# Patient Record
Sex: FEMALE | Race: WHITE | Employment: OTHER | URBAN - METROPOLITAN AREA
[De-identification: names, ages, dates, MRNs, and addresses within clinical notes are randomized per-mention and may not be internally consistent; named-entity substitution may affect disease eponyms.]

---

## 2017-01-27 ENCOUNTER — OFFICE VISIT (OUTPATIENT)
Dept: FAMILY MEDICINE CLINIC | Age: 62
End: 2017-01-27

## 2017-01-27 VITALS
OXYGEN SATURATION: 99 % | RESPIRATION RATE: 18 BRPM | WEIGHT: 193 LBS | HEIGHT: 64 IN | SYSTOLIC BLOOD PRESSURE: 118 MMHG | DIASTOLIC BLOOD PRESSURE: 70 MMHG | HEART RATE: 77 BPM | BODY MASS INDEX: 32.95 KG/M2

## 2017-01-27 DIAGNOSIS — Z23 NEED FOR ZOSTER VACCINATION: ICD-10-CM

## 2017-01-27 DIAGNOSIS — Z00.00 PREVENTATIVE HEALTH CARE: Primary | ICD-10-CM

## 2017-01-27 DIAGNOSIS — Z01.419 ENCOUNTER FOR GYNECOLOGICAL EXAMINATION WITHOUT ABNORMAL FINDING: ICD-10-CM

## 2017-01-27 LAB
ANION GAP SERPL CALCULATED.3IONS-SCNC: 16 MMOL/L (ref 3–16)
BUN BLDV-MCNC: 15 MG/DL (ref 7–20)
CALCIUM SERPL-MCNC: 9.5 MG/DL (ref 8.3–10.6)
CHLORIDE BLD-SCNC: 99 MMOL/L (ref 99–110)
CHOLESTEROL, TOTAL: 252 MG/DL (ref 0–199)
CO2: 27 MMOL/L (ref 21–32)
CREAT SERPL-MCNC: 0.7 MG/DL (ref 0.6–1.2)
GFR AFRICAN AMERICAN: >60
GFR NON-AFRICAN AMERICAN: >60
GLUCOSE BLD-MCNC: 93 MG/DL (ref 70–99)
HDLC SERPL-MCNC: 68 MG/DL (ref 40–60)
LDL CHOLESTEROL CALCULATED: 157 MG/DL
POTASSIUM SERPL-SCNC: 4.5 MMOL/L (ref 3.5–5.1)
SODIUM BLD-SCNC: 142 MMOL/L (ref 136–145)
TRIGL SERPL-MCNC: 136 MG/DL (ref 0–150)
VLDLC SERPL CALC-MCNC: 27 MG/DL

## 2017-01-27 PROCEDURE — 36415 COLL VENOUS BLD VENIPUNCTURE: CPT | Performed by: FAMILY MEDICINE

## 2017-01-27 PROCEDURE — 99396 PREV VISIT EST AGE 40-64: CPT | Performed by: FAMILY MEDICINE

## 2017-01-27 ASSESSMENT — ENCOUNTER SYMPTOMS: GASTROINTESTINAL NEGATIVE: 1

## 2017-02-01 LAB
HPV COMMENT: NORMAL
HPV TYPE 16: NOT DETECTED
HPV TYPE 18: NOT DETECTED
HPVOH (OTHER TYPES): NOT DETECTED

## 2017-08-17 ENCOUNTER — HOSPITAL ENCOUNTER (OUTPATIENT)
Dept: MAMMOGRAPHY | Age: 62
Discharge: HOME | End: 2017-08-17
Attending: FAMILY MEDICINE | Admitting: FAMILY MEDICINE

## 2017-08-17 DIAGNOSIS — Z12.31 ENCOUNTER FOR SCREENING MAMMOGRAM FOR BREAST CANCER: ICD-10-CM

## 2017-08-25 ENCOUNTER — HOSPITAL ENCOUNTER (OUTPATIENT)
Dept: MAMMOGRAPHY | Age: 62
Discharge: HOME | End: 2017-08-25
Attending: FAMILY MEDICINE | Admitting: FAMILY MEDICINE

## 2017-08-25 DIAGNOSIS — R92.8 ABNORMAL MAMMOGRAM, UNSPECIFIED: ICD-10-CM

## 2018-07-31 ENCOUNTER — OFFICE VISIT (OUTPATIENT)
Dept: FAMILY MEDICINE CLINIC | Age: 63
End: 2018-07-31

## 2018-07-31 ENCOUNTER — HOSPITAL ENCOUNTER (OUTPATIENT)
Dept: GENERAL RADIOLOGY | Age: 63
Discharge: HOME | End: 2018-07-31
Payer: COMMERCIAL

## 2018-07-31 VITALS
HEIGHT: 64 IN | HEART RATE: 75 BPM | WEIGHT: 191 LBS | OXYGEN SATURATION: 98 % | DIASTOLIC BLOOD PRESSURE: 84 MMHG | SYSTOLIC BLOOD PRESSURE: 128 MMHG | BODY MASS INDEX: 32.61 KG/M2

## 2018-07-31 DIAGNOSIS — G89.29 CHRONIC PAIN OF BOTH KNEES: ICD-10-CM

## 2018-07-31 DIAGNOSIS — E78.5 DYSLIPIDEMIA: ICD-10-CM

## 2018-07-31 DIAGNOSIS — M25.561 CHRONIC PAIN OF BOTH KNEES: ICD-10-CM

## 2018-07-31 DIAGNOSIS — M25.562 CHRONIC PAIN OF BOTH KNEES: ICD-10-CM

## 2018-07-31 DIAGNOSIS — Z23 NEED FOR PROPHYLACTIC VACCINATION WITH TETANUS-DIPHTHERIA (TD): ICD-10-CM

## 2018-07-31 DIAGNOSIS — Z12.11 COLON CANCER SCREENING: ICD-10-CM

## 2018-07-31 DIAGNOSIS — M25.561 CHRONIC PAIN OF BOTH KNEES: Primary | ICD-10-CM

## 2018-07-31 DIAGNOSIS — G89.29 CHRONIC PAIN OF BOTH KNEES: Primary | ICD-10-CM

## 2018-07-31 DIAGNOSIS — E55.9 VITAMIN D DEFICIENCY: ICD-10-CM

## 2018-07-31 DIAGNOSIS — M25.562 CHRONIC PAIN OF BOTH KNEES: Primary | ICD-10-CM

## 2018-07-31 LAB
A/G RATIO: 2.1 (ref 1.1–2.2)
ALBUMIN SERPL-MCNC: 4.9 G/DL (ref 3.4–5)
ALP BLD-CCNC: 81 U/L (ref 40–129)
ALT SERPL-CCNC: 12 U/L (ref 10–40)
ANION GAP SERPL CALCULATED.3IONS-SCNC: 16 MMOL/L (ref 3–16)
AST SERPL-CCNC: 13 U/L (ref 15–37)
BILIRUB SERPL-MCNC: 0.5 MG/DL (ref 0–1)
BUN BLDV-MCNC: 12 MG/DL (ref 7–20)
CALCIUM SERPL-MCNC: 9.6 MG/DL (ref 8.3–10.6)
CHLORIDE BLD-SCNC: 102 MMOL/L (ref 99–110)
CHOLESTEROL, TOTAL: 255 MG/DL (ref 0–199)
CO2: 25 MMOL/L (ref 21–32)
CREAT SERPL-MCNC: 0.6 MG/DL (ref 0.6–1.2)
GFR AFRICAN AMERICAN: >60
GFR NON-AFRICAN AMERICAN: >60
GLOBULIN: 2.3 G/DL
GLUCOSE BLD-MCNC: 99 MG/DL (ref 70–99)
HDLC SERPL-MCNC: 70 MG/DL (ref 40–60)
LDL CHOLESTEROL CALCULATED: 167 MG/DL
POTASSIUM SERPL-SCNC: 4.4 MMOL/L (ref 3.5–5.1)
SODIUM BLD-SCNC: 143 MMOL/L (ref 136–145)
TOTAL PROTEIN: 7.2 G/DL (ref 6.4–8.2)
TRIGL SERPL-MCNC: 91 MG/DL (ref 0–150)
VITAMIN D 25-HYDROXY: 24 NG/ML
VLDLC SERPL CALC-MCNC: 18 MG/DL

## 2018-07-31 PROCEDURE — 90714 TD VACC NO PRESV 7 YRS+ IM: CPT | Performed by: FAMILY MEDICINE

## 2018-07-31 PROCEDURE — 90471 IMMUNIZATION ADMIN: CPT | Performed by: FAMILY MEDICINE

## 2018-07-31 PROCEDURE — 99214 OFFICE O/P EST MOD 30 MIN: CPT | Performed by: FAMILY MEDICINE

## 2018-07-31 PROCEDURE — G8417 CALC BMI ABV UP PARAM F/U: HCPCS | Performed by: FAMILY MEDICINE

## 2018-07-31 PROCEDURE — 1036F TOBACCO NON-USER: CPT | Performed by: FAMILY MEDICINE

## 2018-07-31 PROCEDURE — G8427 DOCREV CUR MEDS BY ELIG CLIN: HCPCS | Performed by: FAMILY MEDICINE

## 2018-07-31 PROCEDURE — 36415 COLL VENOUS BLD VENIPUNCTURE: CPT | Performed by: FAMILY MEDICINE

## 2018-07-31 PROCEDURE — 73562 X-RAY EXAM OF KNEE 3: CPT

## 2018-07-31 PROCEDURE — 3017F COLORECTAL CA SCREEN DOC REV: CPT | Performed by: FAMILY MEDICINE

## 2018-07-31 ASSESSMENT — PATIENT HEALTH QUESTIONNAIRE - PHQ9
1. LITTLE INTEREST OR PLEASURE IN DOING THINGS: 0
2. FEELING DOWN, DEPRESSED OR HOPELESS: 0
SUM OF ALL RESPONSES TO PHQ QUESTIONS 1-9: 0
SUM OF ALL RESPONSES TO PHQ9 QUESTIONS 1 & 2: 0

## 2018-07-31 ASSESSMENT — ENCOUNTER SYMPTOMS: SHORTNESS OF BREATH: 0

## 2018-07-31 NOTE — PATIENT INSTRUCTIONS
steps 1 through 4, even if only one knee is sore. Straight-leg raises to the front    1. Lie on your back with your good knee bent so that your foot rests flat on the floor. Your affected leg should be straight. Make sure that your low back has a normal curve. You should be able to slip your hand in between the floor and the small of your back, with your palm touching the floor and your back touching the back of your hand. 2. Tighten the thigh muscles in your affected leg by pressing the back of your knee flat down to the floor. Hold your knee straight. 3. Keeping the thigh muscles tight and your leg straight, lift your affected leg up so that your heel is about 12 inches off the floor. Hold for about 6 seconds, then lower slowly. 4. Relax for up to 10 seconds between repetitions. 5. Repeat 8 to 12 times. 6. Switch legs and repeat steps 1 through 5, even if only one knee is sore. Active knee flexion    1. Lie on your stomach with your knees straight. If your kneecap is uncomfortable, roll up a washcloth and put it under your leg just above your kneecap. 2. Lift the foot of your affected leg by bending the knee so that you bring the foot up toward your buttock. If this motion hurts, try it without bending your knee quite as far. This may help you avoid any painful motion. 3. Slowly move your leg up and down. 4. Repeat 8 to 12 times. 5. Switch legs and repeat steps 1 through 4, even if only one knee is sore. Quadriceps stretch (facedown)    1. Lie flat on your stomach, and rest your face on the floor. 2. Wrap a towel or belt strap around the lower part of your affected leg. Then use the towel or belt strap to slowly pull your heel toward your buttock until you feel a stretch. 3. Hold for about 15 to 30 seconds, then relax your leg against the towel or belt strap. 4. Repeat 2 to 4 times. 5. Switch legs and repeat steps 1 through 4, even if only one knee is sore. Stationary exercise bike    1.  If you do

## 2018-07-31 NOTE — PROGRESS NOTES
Chief Complaint   Patient presents with    New Patient     former [facility] patient         HPI      59 y.o. female presents today to establish care. She states that  2 years ago started having knee pain. Escalated to sciatica and hip bursitis in both. Did PT and celebrex for 3 months. Started going to the [facility] and did swimming and hip and sciatica pain resolved. Her knee pain is worse with going down the stairs. If she sits or stands for too long it hurts. Takes aleve and ibuprofen but it doesn't help. Does her PT home exercises at home. Didn't feel it was helpful. Had imaging 10 years ago which showed mild arthritis. No knee swelling. Doesn't feel like it would give out. Hx of HLD not on medication. Hx of vitamin d def taking supplements. Patient Active Problem List   Diagnosis    Menopause    Pure hypercholesterolemia    Vitamin D deficiency     Past Medical History:   Diagnosis Date    Hyperlipidemia     Menopause      (spontaneous vaginal delivery)            Past Surgical History:   Procedure Laterality Date    COLONOSCOPY  02/15/2001, 2006, 10/1/12     Most Recent Immunizations   Administered Date(s) Administered    Tdap (Boostrix, Adacel) 2007        Current Outpatient Prescriptions   Medication Sig Dispense Refill    NONFORMULARY tumeric      vitamin D (CHOLECALCIFEROL) 400 UNITS TABS tablet Take 400 Units by mouth daily.  Multiple Vitamin (MULTI-VITAMIN PO) Take  by mouth. No current facility-administered medications for this visit.       Allergies   Allergen Reactions    Penicillins        Social History     Social History    Marital status:      Spouse name: N/A    Number of children: N/A    Years of education: N/A     Social History Main Topics    Smoking status: Never Smoker    Smokeless tobacco: Never Used    Alcohol use No    Drug use: No    Sexual activity: Yes     Partners: Male      Comment: miranda = Lorenzo     Other Topics Concern   

## 2018-08-01 ENCOUNTER — TELEPHONE (OUTPATIENT)
Dept: FAMILY MEDICINE CLINIC | Age: 63
End: 2018-08-01

## 2018-08-01 DIAGNOSIS — M17.10 ARTHRITIS OF KNEE: Primary | ICD-10-CM

## 2018-08-01 NOTE — TELEPHONE ENCOUNTER
----- Message from Shira Walters MD sent at 7/31/2018  1:13 PM EDT -----  xrays show worsening moderate to severe arthritis in both knees. Please refer to Dr. Mali Gonzalez at [facility] and give her the number to schedule.

## 2018-08-16 ENCOUNTER — OFFICE VISIT (OUTPATIENT)
Dept: ORTHOPEDIC SURGERY | Age: 63
End: 2018-08-16

## 2018-08-16 VITALS — HEIGHT: 64 IN | WEIGHT: 190 LBS | BODY MASS INDEX: 32.44 KG/M2

## 2018-08-16 DIAGNOSIS — M17.0 PRIMARY OSTEOARTHRITIS OF BOTH KNEES: Primary | ICD-10-CM

## 2018-08-16 PROCEDURE — 99243 OFF/OP CNSLTJ NEW/EST LOW 30: CPT | Performed by: ORTHOPAEDIC SURGERY

## 2018-08-16 PROCEDURE — G8427 DOCREV CUR MEDS BY ELIG CLIN: HCPCS | Performed by: ORTHOPAEDIC SURGERY

## 2018-08-16 PROCEDURE — G8417 CALC BMI ABV UP PARAM F/U: HCPCS | Performed by: ORTHOPAEDIC SURGERY

## 2018-08-16 PROCEDURE — 3017F COLORECTAL CA SCREEN DOC REV: CPT | Performed by: ORTHOPAEDIC SURGERY

## 2018-08-16 RX ORDER — MELOXICAM 15 MG/1
15 TABLET ORAL DAILY
Qty: 30 TABLET | Refills: 3 | Status: SHIPPED | OUTPATIENT
Start: 2018-08-16 | End: 2019-08-21 | Stop reason: ALTCHOICE

## 2018-08-16 NOTE — PROGRESS NOTES
I am evaluating this patient as a consult at the request of Graham Leonard MD      Chief Complaint:  Knee Pain (Bilat knee pain - started years ago, no inj)      History of Present Illness:  Molly Gregory is a 59 y.o. female here regarding bilateral knee pain, this is been ongoing for at least 2 years but has been intermittent in nature, is becoming worse over the last 3-6 months. She currently has 7 out of 10 pain after walking for long periods of time. She'll have stiffness in the morning which resolves with ambulation, she'll then noticed that she needs to sit down and rest his knees are 18 by about 2 in the afternoon. Her pain is globally located throughout the knees and is dull and achy in nature. She continues to be quite active participates in a deep water cardio aerobics class IV days a week which helps with her overall health, weight loss and knee pain. She has been treated in the past, 2 years ago she took a 3 month supply of Celebrex but stopped as it was not helping her knee pain. She has not tried any injections, she did formal physical therapy also 2 years ago for 3 months without significant benefit. He she will occasionally take ibuprofen or Aleve over-the-counter if absolutely necessary, she also takes tumor supplement to decrease inflammation. She has been working with her primary care physician for her osteoarthritis, she was referred to me today by her primary care physician to discuss other treatment options. She works as a homemaker. BMI is 32.     Pain Assessment:  Pain Assessment  Location of Pain: Knee  Location Modifiers: Right, Left  Severity of Pain: 7  Quality of Pain: Aching  Duration of Pain: Persistent  Frequency of Pain: Constant  Aggravating Factors: Bending, Exercise, Walking, Stairs  Limiting Behavior: Yes  Relieving Factors: Rest  Result of Injury: No  Work-Related Injury: No  Are there other pain locations you wish to document?: No    Medical History:  Past Medical History:   Diagnosis Date    Hyperlipidemia     Menopause      (spontaneous vaginal delivery)          Past Surgical History:   Procedure Laterality Date    COLONOSCOPY  02/15/2001, 2006, 10/1/12     Social History     Social History    Marital status:      Spouse name: N/A    Number of children: N/A    Years of education: N/A     Social History Main Topics    Smoking status: Never Smoker    Smokeless tobacco: Never Used    Alcohol use No    Drug use: No    Sexual activity: Yes     Partners: Male      Comment: husb = Lorenzo     Other Topics Concern    None     Social History Narrative    None     Allergies   Allergen Reactions    Penicillins        Review of Systems:  Constitutional: negative  Respiratory: negative  Cardiovascular: negative  Musculoskeletal:negative except for Knee Pain (Bilat knee pain - started years ago, no inj)    Relevant review of systems reviewed and available in the patient's chart in media tab    Vital Signs:  Vitals:    18 0924   Weight: 190 lb (86.2 kg)   Height: 5' 4\" (1.626 m)         General Exam:   Constitutional: Patient is adequately groomed with no evidence of malnutrition  Mental Status: The patient is oriented to time, place and person. The patient's mood and affect are appropriate. Vascular: Examination reveals no swelling or calf tenderness. Peripheral pulses are palpable and 2+.    bilateral Knee Examination  Inspection:   No gross deformities noted. no swelling noted. No erythema or ecchymosis. Skin is intact.     Palpation: positive Tenderness to palpation along the medial joint line, negative Tenderness to palpation along lateral joint line, positive Tenderness to palpation along medial and lateral facets of undersurface of the patella    Range of Motion:  0-125    Strength:  Able to do SLR    Special Tests:  ACL, MCL, PCL, LCL are intact with stress exam.  There positive crepitus noted with range of motion under the cortisone and lubrication injections, she does not wish to pursue any injections today for a scheduled appointment future. Cortisone. She will continue doing exercises at the [facility] for strengthening and range of motion.   She'll follow up over the next 4-6 weeks for reevaluation      Mary Moreno

## 2018-08-29 ENCOUNTER — HOSPITAL ENCOUNTER (OUTPATIENT)
Dept: WOMENS IMAGING | Age: 63
Discharge: HOME | End: 2018-08-29
Payer: COMMERCIAL

## 2018-08-29 DIAGNOSIS — Z12.31 ENCOUNTER FOR SCREENING MAMMOGRAM FOR BREAST CANCER: ICD-10-CM

## 2018-08-29 PROCEDURE — 77067 SCR MAMMO BI INCL CAD: CPT

## 2018-11-12 DIAGNOSIS — M25.561 ACUTE PAIN OF BOTH KNEES: Primary | ICD-10-CM

## 2018-11-12 DIAGNOSIS — M25.562 ACUTE PAIN OF BOTH KNEES: Primary | ICD-10-CM

## 2018-11-12 RX ORDER — MELOXICAM 15 MG/1
15 TABLET ORAL DAILY
Qty: 30 TABLET | Refills: 1 | Status: SHIPPED | OUTPATIENT
Start: 2018-11-12 | End: 2019-02-10 | Stop reason: SDUPTHER

## 2019-02-10 DIAGNOSIS — M25.562 ACUTE PAIN OF BOTH KNEES: ICD-10-CM

## 2019-02-10 DIAGNOSIS — M25.561 ACUTE PAIN OF BOTH KNEES: ICD-10-CM

## 2019-02-11 RX ORDER — MELOXICAM 15 MG/1
TABLET ORAL
Qty: 30 TABLET | Refills: 1 | Status: SHIPPED | OUTPATIENT
Start: 2019-02-11 | End: 2019-08-21 | Stop reason: CLARIF

## 2019-05-23 ENCOUNTER — OFFICE VISIT (OUTPATIENT)
Dept: ORTHOPEDIC SURGERY | Age: 64
End: 2019-05-23
Payer: COMMERCIAL

## 2019-05-23 VITALS — HEIGHT: 64 IN | WEIGHT: 190.04 LBS | BODY MASS INDEX: 32.44 KG/M2

## 2019-05-23 DIAGNOSIS — M17.0 PRIMARY OSTEOARTHRITIS OF BOTH KNEES: Primary | ICD-10-CM

## 2019-05-23 DIAGNOSIS — M25.562 ACUTE PAIN OF BOTH KNEES: ICD-10-CM

## 2019-05-23 DIAGNOSIS — M25.561 ACUTE PAIN OF BOTH KNEES: ICD-10-CM

## 2019-05-23 PROCEDURE — 99213 OFFICE O/P EST LOW 20 MIN: CPT | Performed by: ORTHOPAEDIC SURGERY

## 2019-05-23 PROCEDURE — G8427 DOCREV CUR MEDS BY ELIG CLIN: HCPCS | Performed by: ORTHOPAEDIC SURGERY

## 2019-05-23 PROCEDURE — 20610 DRAIN/INJ JOINT/BURSA W/O US: CPT | Performed by: ORTHOPAEDIC SURGERY

## 2019-05-23 PROCEDURE — 3017F COLORECTAL CA SCREEN DOC REV: CPT | Performed by: ORTHOPAEDIC SURGERY

## 2019-05-23 PROCEDURE — 1036F TOBACCO NON-USER: CPT | Performed by: ORTHOPAEDIC SURGERY

## 2019-05-23 PROCEDURE — G8417 CALC BMI ABV UP PARAM F/U: HCPCS | Performed by: ORTHOPAEDIC SURGERY

## 2019-05-23 RX ORDER — MELOXICAM 15 MG/1
15 TABLET ORAL DAILY
Qty: 30 TABLET | Refills: 3 | Status: SHIPPED | OUTPATIENT
Start: 2019-05-23 | End: 2019-08-19 | Stop reason: SDUPTHER

## 2019-05-23 NOTE — PROGRESS NOTES
Depo-Medrol administration details:  Date & Time: 5/23/19 11:11 AM  Site & Comments:tad knee administered by Dr. Leonides Kaminski  # CC: 2  [facility]: [id number]   Lot number: [id number]  Exp: 11/2019    Sensorcaine 0.25%                                            # CC: 4                                                                  [facility]: 89371-991-57                                                Lot number: [id number]                                     Exp: 12/2022                                                                                                   Lidocaine 1%  #CC: 4  NDC: 4095-0194-65  Lot#: [id number]  Exp: 10/1/2020

## 2019-05-23 NOTE — PROGRESS NOTES
Chief Complaint:  Follow-up (ck tad knee)      History of Present Illness:  Emelina Mckenna is a 62 y.o. female here regarding bilateral knee pain, I saw her in 2018 we discussed treatment options for osteoarthritis. Patient elected meloxicam at that point, she has seen improvement in symptoms with the meloxicam.  She is here today to discuss further treatment options. She has noticed 6 out of 10 dull achy pain, this is worse with walking. She is going on a 2-1/2 week trip out [place] where she will be doing more walking than usual which prompted her visit today. She ran out of the meloxicam as well.     Pain Assessment:  Pain Assessment  Location of Pain: Knee  Location Modifiers: Right, Left  Severity of Pain: 6  Result of Injury: No  Work-Related Injury: No  Are there other pain locations you wish to document?: No    Medical History:  Past Medical History:   Diagnosis Date    Hyperlipidemia     Menopause      (spontaneous vaginal delivery)          Past Surgical History:   Procedure Laterality Date    COLONOSCOPY  02/15/2001, 2006, 10/1/12     Social History     Socioeconomic History    Marital status:      Spouse name: None    Number of children: None    Years of education: None    Highest education level: None   Occupational History    None   Social Needs    Financial resource strain: None    Food insecurity:     Worry: None     Inability: None    Transportation needs:     Medical: None     Non-medical: None   Tobacco Use    Smoking status: Never Smoker    Smokeless tobacco: Never Used   Substance and Sexual Activity    Alcohol use: No    Drug use: No    Sexual activity: Yes     Partners: Male     Comment: husb = Lorenzo   Lifestyle    Physical activity:     Days per week: None     Minutes per session: None    Stress: None   Relationships    Social connections:     Talks on phone: None     Gets together: None     Attends Yazidism service: None     Active member of club or organization: None     Attends meetings of clubs or organizations: None     Relationship status: None    Intimate partner violence:     Fear of current or ex partner: None     Emotionally abused: None     Physically abused: None     Forced sexual activity: None   Other Topics Concern    None   Social History Narrative    None     Allergies   Allergen Reactions    Penicillins        Review of Systems:  Constitutional: negative  Respiratory: negative  Cardiovascular: negative  Musculoskeletal:negative except for Follow-up (ck tad knee)    Relevant review of systems reviewed and available in the patient's chart in media tab    Vital Signs:  Vitals:    05/23/19 1105   Weight: 190 lb 0.6 oz (86.2 kg)   Height: 5' 4.02\" (1.626 m)         General Exam:   Constitutional: Patient is adequately groomed with no evidence of malnutrition  Mental Status: The patient is oriented to time, place and person. The patient's mood and affect are appropriate. Vascular: Examination reveals no swelling or calf tenderness. Peripheral pulses are palpable and 2+.    bilateral Knee Examination  And clean dry and intact, no effusion, crepitus under the patella with range of motion, mild tenderness along medial and lateral joint lines. 5 out of 5 flexion and extension strength. Distal neurovascular exam is intact. LUMBAR SPINE: The skin is warm and dry. There is no swelling, warmth, or erythema. Range of motion is within normal limits. There is no paraspinal or spinous process tenderness. Ipsilateral and contralateral straight leg raising tests are negative. The distal neurovascular exam is grossly intact and symmetric. Assessment :  Bilateral knee osteoarthritis    Impression:  Encounter Diagnoses   Name Primary?     Primary osteoarthritis of both knees Yes    Acute pain of both knees        Office Procedures:  Orders Placed This Encounter   Procedures    ID METHYLPREDNISOLONE 40 MG INJ    ID ARTHROCENTESIS ASPIR&/INJ

## 2019-08-19 DIAGNOSIS — M25.562 ACUTE PAIN OF BOTH KNEES: ICD-10-CM

## 2019-08-19 DIAGNOSIS — M25.561 ACUTE PAIN OF BOTH KNEES: ICD-10-CM

## 2019-08-19 DIAGNOSIS — M17.0 PRIMARY OSTEOARTHRITIS OF BOTH KNEES: ICD-10-CM

## 2019-08-20 RX ORDER — MELOXICAM 15 MG/1
TABLET ORAL
Qty: 30 TABLET | Refills: 3 | Status: SHIPPED | OUTPATIENT
Start: 2019-08-20 | End: 2020-01-13

## 2019-08-21 ENCOUNTER — OFFICE VISIT (OUTPATIENT)
Dept: FAMILY MEDICINE CLINIC | Age: 64
End: 2019-08-21
Payer: COMMERCIAL

## 2019-08-21 VITALS
WEIGHT: 196 LBS | BODY MASS INDEX: 33.63 KG/M2 | DIASTOLIC BLOOD PRESSURE: 86 MMHG | HEART RATE: 64 BPM | SYSTOLIC BLOOD PRESSURE: 138 MMHG | TEMPERATURE: 98.4 F | OXYGEN SATURATION: 98 %

## 2019-08-21 DIAGNOSIS — E55.9 VITAMIN D DEFICIENCY: ICD-10-CM

## 2019-08-21 DIAGNOSIS — Z12.83 SKIN EXAM, SCREENING FOR CANCER: ICD-10-CM

## 2019-08-21 DIAGNOSIS — E78.5 DYSLIPIDEMIA: ICD-10-CM

## 2019-08-21 DIAGNOSIS — Z00.00 ANNUAL PHYSICAL EXAM: Primary | ICD-10-CM

## 2019-08-21 LAB
CHOLESTEROL, TOTAL: 269 MG/DL (ref 0–199)
HDLC SERPL-MCNC: 75 MG/DL (ref 40–60)
LDL CHOLESTEROL CALCULATED: 173 MG/DL
TRIGL SERPL-MCNC: 104 MG/DL (ref 0–150)
VITAMIN D 25-HYDROXY: 29 NG/ML
VLDLC SERPL CALC-MCNC: 21 MG/DL

## 2019-08-21 PROCEDURE — 99396 PREV VISIT EST AGE 40-64: CPT | Performed by: FAMILY MEDICINE

## 2019-08-21 PROCEDURE — 36415 COLL VENOUS BLD VENIPUNCTURE: CPT | Performed by: FAMILY MEDICINE

## 2019-08-21 ASSESSMENT — ENCOUNTER SYMPTOMS
VOMITING: 0
ABDOMINAL PAIN: 0
NAUSEA: 0
DIARRHEA: 0
CONSTIPATION: 0
SHORTNESS OF BREATH: 0

## 2019-08-21 NOTE — PATIENT INSTRUCTIONS
Patient Education        Well Visit, Women 48 to 72: Care Instructions  Your Care Instructions    Physical exams can help you stay healthy. Your doctor has checked your overall health and may have suggested ways to take good care of yourself. He or she also may have recommended tests. At home, you can help prevent illness with healthy eating, regular exercise, and other steps. Follow-up care is a key part of your treatment and safety. Be sure to make and go to all appointments, and call your doctor if you are having problems. It's also a good idea to know your test results and keep a list of the medicines you take. How can you care for yourself at home? · Reach and stay at a healthy weight. This will lower your risk for many problems, such as obesity, diabetes, heart disease, and high blood pressure. · Get at least 30 minutes of exercise on most days of the week. Walking is a good choice. You also may want to do other activities, such as running, swimming, cycling, or playing tennis or team sports. · Do not smoke. Smoking can make health problems worse. If you need help quitting, talk to your doctor about stop-smoking programs and medicines. These can increase your chances of quitting for good. · Protect your skin from too much sun. When you're outdoors from 10 a.m. to 4 p.m., stay in the shade or cover up with clothing and a hat with a wide brim. Wear sunglasses that block UV rays. Even when it's cloudy, put broad-spectrum sunscreen (SPF 30 or higher) on any exposed skin. · See a dentist one or two times a year for checkups and to have your teeth cleaned. · Wear a seat belt in the car. Follow your doctor's advice about when to have certain tests. These tests can spot problems early. · Cholesterol. Your doctor will tell you how often to have this done based on your age, family history, or other things that can increase your risk for heart attack and stroke. · Blood pressure.  Have your blood pressure

## 2019-08-21 NOTE — PROGRESS NOTES
Male     Comment: husb = Lorenzo   Lifestyle    Physical activity:     Days per week: Not on file     Minutes per session: Not on file    Stress: Not on file   Relationships    Social connections:     Talks on phone: Not on file     Gets together: Not on file     Attends Christianity service: Not on file     Active member of club or organization: Not on file     Attends meetings of clubs or organizations: Not on file     Relationship status: Not on file    Intimate partner violence:     Fear of current or ex partner: Not on file     Emotionally abused: Not on file     Physically abused: Not on file     Forced sexual activity: Not on file   Other Topics Concern    Not on file   Social History Narrative    Not on file     Family History   Problem Relation Age of Onset    Cancer Mother         colon cancer/cervical cancer    Cataracts Mother     High Cholesterol Father     Cataracts Father     Cancer Brother         anal cancer                              Review Of Systems    Review of Systems   Constitutional: Negative for chills and fever. Eyes: Negative for visual disturbance. Respiratory: Negative for shortness of breath. Cardiovascular: Negative for chest pain. Gastrointestinal: Negative for abdominal pain, constipation, diarrhea, nausea and vomiting. Genitourinary: Negative for dysuria. Psychiatric/Behavioral: Negative for behavioral problems. PHYSICAL EXAMINATION:    /86   Pulse 64   Temp 98.4 °F (36.9 °C) (Oral)   Wt 196 lb (88.9 kg)   LMP  (LMP Unknown)   SpO2 98%   BMI 33.63 kg/m²      Physical Exam   Constitutional: She is oriented to person, place, and time. She appears well-developed and well-nourished. No distress. HENT:   Head: Normocephalic and atraumatic. Right Ear: External ear normal.   Left Ear: External ear normal.   Mouth/Throat: Oropharynx is clear and moist. No oropharyngeal exudate. Eyes: Pupils are equal, round, and reactive to light.  Conjunctivae and EOM are normal. Right eye exhibits no discharge. Left eye exhibits no discharge. Neck: Normal range of motion. No thyromegaly present. Cardiovascular: Normal rate, regular rhythm and normal heart sounds. Pulmonary/Chest: Effort normal and breath sounds normal. No respiratory distress. She has no wheezes. Neurological: She is alert and oriented to person, place, and time. Skin: Skin is warm and dry. She is not diaphoretic. Psychiatric: She has a normal mood and affect. Vitals reviewed. ASSESSMENT:   Well Adult, See encounter diagnoses  Lindy Henson was seen today for annual exam.    Diagnoses and all orders for this visit:    Annual physical exam  Advised to work on improving diet and engaging in cardio 30 minutes day 5 days a week. -     Hemoglobin A1C    Dyslipidemia  -     Lipid Panel    Vitamin D deficiency  -     Vitamin D 25 Hydroxy    Skin exam, screening for cancer  -     Rupa Bailey MD, Dermatology, [facility]          Plan:   See orders and medications filed with this encounter. The patient is advised to return for routine annual checkups. Encouraged healthy diet, regular exercise and multivitamin daily. Labs checked per orders. Optho visit q 1-2 years. Watch for skin mole changes. Colonoscopy if over age 48 or if family history was discussed. Vaccines ordered today per orders. Return in about 1 year (around 8/21/2020) for or sooner if needed.

## 2019-08-22 LAB
ESTIMATED AVERAGE GLUCOSE: 122.6 MG/DL
HBA1C MFR BLD: 5.9 %

## 2019-09-05 ENCOUNTER — HOSPITAL ENCOUNTER (OUTPATIENT)
Dept: WOMENS IMAGING | Age: 64
Discharge: HOME | End: 2019-09-05
Payer: COMMERCIAL

## 2019-09-05 DIAGNOSIS — Z12.31 ENCOUNTER FOR SCREENING MAMMOGRAM FOR BREAST CANCER: ICD-10-CM

## 2019-09-05 PROCEDURE — 77067 SCR MAMMO BI INCL CAD: CPT

## 2019-10-03 ENCOUNTER — TELEPHONE (OUTPATIENT)
Dept: FAMILY MEDICINE CLINIC | Age: 64
End: 2019-10-03

## 2019-10-03 NOTE — TELEPHONE ENCOUNTER
Call from patient wanting to inform us that she has a appointment with Dermatology in October of 2020.   FYI

## 2019-10-03 NOTE — TELEPHONE ENCOUNTER
Noted. Does she have any moles that are concerning right now? Or was her appt with dermatology just for a skin check?

## 2019-10-04 NOTE — TELEPHONE ENCOUNTER
Left message for patient and asked her to call back if there was something she needed looked at right away

## 2019-12-03 ENCOUNTER — IMMUNIZATION (OUTPATIENT)
Dept: FAMILY MEDICINE CLINIC | Age: 64
End: 2019-12-03
Payer: COMMERCIAL

## 2019-12-03 DIAGNOSIS — Z23 NEED FOR VACCINATION: Primary | ICD-10-CM

## 2019-12-03 PROCEDURE — 90686 IIV4 VACC NO PRSV 0.5 ML IM: CPT | Performed by: FAMILY MEDICINE

## 2019-12-03 PROCEDURE — 90471 IMMUNIZATION ADMIN: CPT | Performed by: FAMILY MEDICINE

## 2020-01-13 RX ORDER — MELOXICAM 15 MG/1
TABLET ORAL
Qty: 30 TABLET | Refills: 3 | Status: SHIPPED | OUTPATIENT
Start: 2020-01-13 | End: 2021-08-06

## 2020-08-20 ENCOUNTER — OFFICE VISIT (OUTPATIENT)
Dept: ORTHOPEDIC SURGERY | Age: 65
End: 2020-08-20
Payer: COMMERCIAL

## 2020-08-20 ENCOUNTER — TELEPHONE (OUTPATIENT)
Dept: ORTHOPEDIC SURGERY | Age: 65
End: 2020-08-20

## 2020-08-20 VITALS — BODY MASS INDEX: 33.46 KG/M2 | HEIGHT: 64 IN | WEIGHT: 196 LBS

## 2020-08-20 PROCEDURE — G8427 DOCREV CUR MEDS BY ELIG CLIN: HCPCS | Performed by: ORTHOPAEDIC SURGERY

## 2020-08-20 PROCEDURE — 20610 DRAIN/INJ JOINT/BURSA W/O US: CPT | Performed by: ORTHOPAEDIC SURGERY

## 2020-08-20 PROCEDURE — G8417 CALC BMI ABV UP PARAM F/U: HCPCS | Performed by: ORTHOPAEDIC SURGERY

## 2020-08-20 PROCEDURE — 1036F TOBACCO NON-USER: CPT | Performed by: ORTHOPAEDIC SURGERY

## 2020-08-20 PROCEDURE — 3017F COLORECTAL CA SCREEN DOC REV: CPT | Performed by: ORTHOPAEDIC SURGERY

## 2020-08-20 PROCEDURE — 99213 OFFICE O/P EST LOW 20 MIN: CPT | Performed by: ORTHOPAEDIC SURGERY

## 2020-08-20 RX ORDER — LIDOCAINE HYDROCHLORIDE 10 MG/ML
4 INJECTION, SOLUTION INFILTRATION; PERINEURAL ONCE
Status: COMPLETED | OUTPATIENT
Start: 2020-08-20 | End: 2020-08-20

## 2020-08-20 RX ORDER — BUPIVACAINE HYDROCHLORIDE 2.5 MG/ML
4 INJECTION, SOLUTION INFILTRATION; PERINEURAL ONCE
Status: COMPLETED | OUTPATIENT
Start: 2020-08-20 | End: 2020-08-20

## 2020-08-20 RX ORDER — METHYLPREDNISOLONE ACETATE 40 MG/ML
80 INJECTION, SUSPENSION INTRA-ARTICULAR; INTRALESIONAL; INTRAMUSCULAR; SOFT TISSUE ONCE
Status: COMPLETED | OUTPATIENT
Start: 2020-08-20 | End: 2020-08-20

## 2020-08-20 RX ORDER — MELOXICAM 15 MG/1
15 TABLET ORAL DAILY PRN
Qty: 30 TABLET | Refills: 2 | Status: SHIPPED | OUTPATIENT
Start: 2020-08-20 | End: 2020-11-17

## 2020-08-20 RX ADMIN — METHYLPREDNISOLONE ACETATE 80 MG: 40 INJECTION, SUSPENSION INTRA-ARTICULAR; INTRALESIONAL; INTRAMUSCULAR; SOFT TISSUE at 09:29

## 2020-08-20 RX ADMIN — METHYLPREDNISOLONE ACETATE 80 MG: 40 INJECTION, SUSPENSION INTRA-ARTICULAR; INTRALESIONAL; INTRAMUSCULAR; SOFT TISSUE at 09:30

## 2020-08-20 RX ADMIN — LIDOCAINE HYDROCHLORIDE 4 ML: 10 INJECTION, SOLUTION INFILTRATION; PERINEURAL at 09:28

## 2020-08-20 RX ADMIN — LIDOCAINE HYDROCHLORIDE 4 ML: 10 INJECTION, SOLUTION INFILTRATION; PERINEURAL at 09:29

## 2020-08-20 RX ADMIN — BUPIVACAINE HYDROCHLORIDE 10 MG: 2.5 INJECTION, SOLUTION INFILTRATION; PERINEURAL at 09:27

## 2020-08-20 RX ADMIN — BUPIVACAINE HYDROCHLORIDE 10 MG: 2.5 INJECTION, SOLUTION INFILTRATION; PERINEURAL at 09:28

## 2020-08-20 NOTE — PROGRESS NOTES
were discussed in great detail with the patient, at this time the patient would like to pursue renewal of meloxicam, cortisone injections today and approval for Visco supplementation. She will continue her home exercise program with the stationary bike. The risks and benefits of an injection were discussed with the patient. The patient had full opportunity to ask questions and all were answered. The patient then provided verbal informed consent. The skin was then prepped with betadine solution and alcohol. Under aseptic conditions, the  bilateral knees were injected with 4cc of 1% lidocaine, 4cc of 0.25% marcaine and 80 mg of Depomedrol. There were no immediate complications following the injection. The patient was advised of the possibility of injection site reaction and instructed to apply ice to the area.         Vikram Adamson

## 2020-08-20 NOTE — TELEPHONE ENCOUNTER
Called & left voicemail for patient informing her that her euflexxa injections for both knees were approved & she can call the number [phone] to schedule the appointments.

## 2020-09-03 ENCOUNTER — OFFICE VISIT (OUTPATIENT)
Dept: ORTHOPEDIC SURGERY | Age: 65
End: 2020-09-03
Payer: COMMERCIAL

## 2020-09-03 PROCEDURE — G8417 CALC BMI ABV UP PARAM F/U: HCPCS | Performed by: ORTHOPAEDIC SURGERY

## 2020-09-03 PROCEDURE — 3017F COLORECTAL CA SCREEN DOC REV: CPT | Performed by: ORTHOPAEDIC SURGERY

## 2020-09-03 PROCEDURE — 20610 DRAIN/INJ JOINT/BURSA W/O US: CPT | Performed by: ORTHOPAEDIC SURGERY

## 2020-09-03 PROCEDURE — 1036F TOBACCO NON-USER: CPT | Performed by: ORTHOPAEDIC SURGERY

## 2020-09-03 PROCEDURE — 99213 OFFICE O/P EST LOW 20 MIN: CPT | Performed by: ORTHOPAEDIC SURGERY

## 2020-09-03 PROCEDURE — G8427 DOCREV CUR MEDS BY ELIG CLIN: HCPCS | Performed by: ORTHOPAEDIC SURGERY

## 2020-09-03 RX ORDER — HYALURONATE SODIUM 10 MG/ML
20 SYRINGE (ML) INTRAARTICULAR ONCE
Status: COMPLETED | OUTPATIENT
Start: 2020-09-03 | End: 2020-09-03

## 2020-09-03 RX ADMIN — Medication 20 MG: at 08:47

## 2020-09-03 RX ADMIN — Medication 20 MG: at 08:48

## 2020-09-03 NOTE — PROGRESS NOTES
Subjective    Patient ID: Ashli Gonzalez is a 60 y.o. [place] Holbrook female. Chief Complaint   Patient presents with    Injections     #1 Euflexxa bilat knee       Patient presents today for the 1 injection of Euflexxa . Pt has been previously diagnosed with osteoarthritis of the knee as documented in the prior progress notes. This injection is for the patients Bilateral knee. Patient denies and fevers, chills, recent infections, or new injuries to the knee. Pain Assessment:  Pain Assessment  Location of Pain: Knee  Location Modifiers: Right, Left  Severity of Pain: 3  Quality of Pain: Aching, Dull  Duration of Pain: A few days  Frequency of Pain: Intermittent  Aggravating Factors: Exercise, Standing, Walking, Stairs  Limiting Behavior: Yes  Relieving Factors: Rest  Result of Injury: No  Work-Related Injury: No  Are there other pain locations you wish to document?: No    Patient's medications, allergies, past medical, surgical, social and family histories were reviewed and updated as appropriate. Physical Exam:    The patient does have  pain on motion, there is not an effusion, there is tenderness over the  medial, lateral region. No erythema noted. Sensation intact to touch. Pulses present distally. Procedure Note:    The patients Bilateral knee was initially prepped using Betadine swab. The superior lateral aspect of the knee was prepped and used for this injection. Under aseptic technique the soft tissues were anesthetized topically. Following adequate anesthesia, the 2ml of  Euflexxa injection was performed. This was injected directly into the joint capsule of the knee. No complications were encountered and the patient tolerated the procedure well. A band aid was placed over the injection site following the procedure. Diagnosis Orders   1.  Primary osteoarthritis of both knees  [place] ARTHROCENTESIS ASPIR&/INJ MAJOR JT/BURSA W/O US    sodium hyaluronate (viscosup) injection 20 mg    sodium hyaluronate (viscosup) injection 20 mg       [place] ARTHROCENTESIS ASPIR&/INJ MAJOR JT/BURSA W/O US  Assessment:  1)  Euflexxa  injection of the Bilateral knee  2) Osteoarthritis    Plan:  1) ice, elevation, gentle range of motion as needed for swelling or stiffness of the knee  2) NSAIDs for any pain after injection   3) F/U ONE WEEK #2 INJ DENNISE KNEE EUFLEXXA      Natacha Osborn

## 2020-09-10 ENCOUNTER — OFFICE VISIT (OUTPATIENT)
Dept: ORTHOPEDIC SURGERY | Age: 65
End: 2020-09-10
Payer: COMMERCIAL

## 2020-09-10 VITALS — HEIGHT: 64 IN | BODY MASS INDEX: 33.46 KG/M2 | WEIGHT: 196 LBS

## 2020-09-10 PROCEDURE — 20610 DRAIN/INJ JOINT/BURSA W/O US: CPT | Performed by: ORTHOPAEDIC SURGERY

## 2020-09-10 RX ORDER — HYALURONATE SODIUM 10 MG/ML
20 SYRINGE (ML) INTRAARTICULAR ONCE
Status: COMPLETED | OUTPATIENT
Start: 2020-09-10 | End: 2020-09-10

## 2020-09-10 RX ADMIN — Medication 20 MG: at 08:50

## 2020-09-10 NOTE — PROGRESS NOTES
Subjective    Patient ID: Gal Parra is a 60 y.o. Efren Escobedo female. Chief Complaint   Patient presents with    Injections     #2 EUFLEXXA INJ DENNISE KNEES       Patient presents today for the 2 injection of Euflexxa . Pt has been previously diagnosed with osteoarthritis of the knee as documented in the prior progress notes. This injection is for the patients Bilateral knee. Patient denies and fevers, chills, recent infections, or new injuries to the knee. Pain Assessment:  Pain Assessment  Location of Pain: Knee  Location Modifiers: Right, Left  Severity of Pain: 3  Quality of Pain: Aching  Duration of Pain: A few days  Frequency of Pain: Intermittent  Aggravating Factors: Exercise  Limiting Behavior: Some  Relieving Factors: Rest  Result of Injury: No  Work-Related Injury: No  Are there other pain locations you wish to document?: No    Patient's medications, allergies, past medical, surgical, social and family histories were reviewed and updated as appropriate. Physical Exam:    The patient does have  pain on motion, there is not an effusion, there is tenderness over the  medial, lateral region. No erythema noted. Sensation intact to touch. Pulses present distally. Procedure Note:    The patients Bilateral knee was initially prepped using Betadine swab. The superior lateral aspect of the knee was prepped and used for this injection. Under aseptic technique the soft tissues were anesthetized topically. Following adequate anesthesia, the 2ml of  Euflexxa injection was performed. This was injected directly into the joint capsule of the knee. No complications were encountered and the patient tolerated the procedure well. A band aid was placed over the injection site following the procedure. Diagnosis Orders   1.  Primary osteoarthritis of both knees  [place] ARTHROCENTESIS ASPIR&/INJ MAJOR JT/BURSA W/O US    sodium hyaluronate (viscosup) injection 20 mg    sodium hyaluronate (viscosup) injection

## 2020-09-17 ENCOUNTER — OFFICE VISIT (OUTPATIENT)
Dept: ORTHOPEDIC SURGERY | Age: 65
End: 2020-09-17
Payer: COMMERCIAL

## 2020-09-17 VITALS — HEIGHT: 64 IN | WEIGHT: 196 LBS | BODY MASS INDEX: 33.46 KG/M2

## 2020-09-17 PROCEDURE — 20610 DRAIN/INJ JOINT/BURSA W/O US: CPT | Performed by: ORTHOPAEDIC SURGERY

## 2020-09-17 RX ORDER — HYALURONATE SODIUM 10 MG/ML
40 SYRINGE (ML) INTRAARTICULAR ONCE
Status: COMPLETED | OUTPATIENT
Start: 2020-09-17 | End: 2020-09-17

## 2020-09-17 RX ADMIN — Medication 40 MG: at 09:16

## 2020-09-17 NOTE — PROGRESS NOTES
Subjective    Patient ID: Ayleen Vyas is a 60 y.o. Taylor Choi female. Chief Complaint   Patient presents with    Injections     #3 euflexxa tad knee       Patient presents today for the 3 injection of Euflexxa . Pt has been previously diagnosed with osteoarthritis of the knee as documented in the prior progress notes. This injection is for the patients Bilateral knee. Patient denies and fevers, chills, recent infections, or new injuries to the knee. Pain Assessment:       Patient's medications, allergies, past medical, surgical, social and family histories were reviewed and updated as appropriate. Physical Exam:    The patient does not have  pain on motion, there is not an effusion, there is tenderness over the  medial, lateral region. No erythema noted. Sensation intact to touch. Pulses present distally. Procedure Note:    The patients Bilateral knee was initially prepped using Betadine swab. The superior lateral aspect of the knee was prepped and used for this injection. Under aseptic technique the soft tissues were anesthetized topically. Following adequate anesthesia, the 2ml of  Euflexxa injection was performed. This was injected directly into the joint capsule of the knee. No complications were encountered and the patient tolerated the procedure well. A band aid was placed over the injection site following the procedure. Diagnosis Orders   1.  Primary osteoarthritis of both knees  [place] ARTHROCENTESIS ASPIR&/INJ MAJOR JT/BURSA W/O US       DC ARTHROCENTESIS ASPIR&/INJ MAJOR JT/BURSA W/O US  Assessment:  1)  Euflexxa  injection of the Bilateral knee  2) Osteoarthritis    Plan:  1) ice, elevation, gentle range of motion as needed for swelling or stiffness of the knee  2) NSAIDs for any pain after injection   3) F/U 6 months STELLA Miller

## 2020-10-27 ENCOUNTER — OFFICE VISIT (OUTPATIENT)
Dept: DERMATOLOGY | Age: 65
End: 2020-10-27
Payer: COMMERCIAL

## 2020-10-27 VITALS — TEMPERATURE: 96.8 F

## 2020-10-27 PROCEDURE — G8417 CALC BMI ABV UP PARAM F/U: HCPCS | Performed by: DERMATOLOGY

## 2020-10-27 PROCEDURE — G8484 FLU IMMUNIZE NO ADMIN: HCPCS | Performed by: DERMATOLOGY

## 2020-10-27 PROCEDURE — 99213 OFFICE O/P EST LOW 20 MIN: CPT | Performed by: DERMATOLOGY

## 2020-10-27 PROCEDURE — G8427 DOCREV CUR MEDS BY ELIG CLIN: HCPCS | Performed by: DERMATOLOGY

## 2020-10-27 NOTE — Clinical Note
Nikky,    I had the pleasure of seeing your patient, Jyoti Clark, in my office recently. Thanks so much for involving me in her care. Please see my note and call me if you have any questions.     Best regards,  Tanner Segura, DO

## 2020-10-27 NOTE — PROGRESS NOTES
 Social connections     Talks on phone: Not on file     Gets together: Not on file     Attends Denominational service: Not on file     Active member of club or organization: Not on file     Attends meetings of clubs or organizations: Not on file     Relationship status: Not on file    Intimate partner violence     Fear of current or ex partner: Not on file     Emotionally abused: Not on file     Physically abused: Not on file     Forced sexual activity: Not on file   Other Topics Concern    Not on file   Social History Narrative    Not on file       Physical Examination     The following were examined and determined to be normal: Psych/Neuro, Scalp/hair, Head/face, Conjunctivae/eyelids, Gums/teeth/lips, Neck, Breast/axilla/chest, Abdomen, Back, RUE, LUE, RLE, LLE and Nails/digits. buttocks. Areas covered by underwear garment(s) not examined. The following were examined and determined to be abnormal: none. Simons phototype: 2    -Constitutional: Well appearing, no acute distress  -Neurological: Alert and oriented X 3  -Mood and Affect: Pleasant  Total body skin exam performed, areas examined listed above:   1. Scattered on the head,neck, trunk and extremities are multiple well-defined round and oval symmetric smoothly-bordered uniformly brown macules and papules. no change in size/shape/color of any lesions; no bleeding lesions. 2. Left shin- Dome-shaped pink-tan papule with positive dimple sign  3. stuck-on appearing tan-brown verrucous papules located to forehead, right forearm  4. several discrete and coalescing few 2-4 mm round uniformly brown macules scattered to face, neck, and sun exposed areas on torso and extremities    Assessment and Plan     1. Multiple benign nevi    2. Dermatofibroma    3. Seborrheic keratoses    4. Solar lentiginosis        1.  Multiple benign nevi  Benign acquired melanocytic nevi  -Recommend monthly self skin exams   -Educated regarding the ABCDEs of melanoma detection -Call for any new/changing moles or concerning lesions  -Reviewed sun protective behavior -- sun avoidance during the peak hours of the day, sun-protective clothing (including hat and sunglasses), sunscreen use (water resistant, broad spectrum, SPF at least 30, need for reapplication every 1.5 to 2 hours), avoidance of tanning beds   -Plan: Observation with annual skin checks (earlier if indicated) performed in office to monitor current nevi and to assess for new lesions. 2. Dermatofibroma  -Educated patient that dermatofibromas are stable and benign scar-like lesions, only definitive treatment is surgical excision, patient to call office if develops symptoms of pain/tenderness/itch or changes in size,shape, color  -Plan: Reassurance, re: benign nature, observation      3. Seborrheic keratoses  Patient educated that seborrheic keratoses have no malignant potential.    -Reassurance provided to the patient regarding their chronic and benign nature. No treatment performed    4. Solar lentiginosis  Solar lentigines  -Edu re: benignity, relationship w/ chronic cumulative sun exposure, darkening w/ unprotected sun exposure  -Reviewed sun protective behavior -- sun avoidance during the peak hours of the day, sun-protective clothing (including hat and sunglasses), sunscreen use (water resistant, broad spectrum, SPF at least 30, need for reapplication every 2 to 3 hours), avoidance of tanning beds       Return to Clinic:  1 year skin exam  Discussed plan with patient and/or primary caretaker. Patient to call clinic with any questions / concerns. Reviewed proper use and side effects of treatment(s) and/or medication(s) with patient and/or primary caretaker. AVS provided or is available on [facility] [employer]   Note is transcribed using voice recognition software. Inadvertent computerized transcription errors may be present.

## 2020-10-27 NOTE — PATIENT INSTRUCTIONS
Sun Protection Tips    Apply broad spectrum water resistant sunscreen with an SPF of at least 30 to exposed areas of the skin. Dont forget the ears and lips! Remember to reapply sunscreen about every 2 hours and after swimming or sweating. Wear sun protective clothing. Swim shirts (aka. rash guards) are a great idea and negates the need to reapply sunscreen in those areas. Seek the shade whenever possible especially between the hours of 10am and 4pm when the suns rays are the strongest.     Avoid tanning beds          Wear a wide brim hat while in the sun        Thanks for your visit! Feel free to send  Dr. Teri Cassidy assistant, Estephania, a Fo[employer] message for any questions, concerns or  to schedule your cosmetic procedures.

## 2020-11-17 RX ORDER — MELOXICAM 15 MG/1
TABLET ORAL
Qty: 30 TABLET | Refills: 2 | Status: SHIPPED | OUTPATIENT
Start: 2020-11-17 | End: 2021-01-04 | Stop reason: SDUPTHER

## 2021-01-04 ENCOUNTER — TELEPHONE (OUTPATIENT)
Dept: ORTHOPEDIC SURGERY | Age: 66
End: 2021-01-04

## 2021-01-04 DIAGNOSIS — M17.0 PRIMARY OSTEOARTHRITIS OF BOTH KNEES: ICD-10-CM

## 2021-01-04 RX ORDER — MELOXICAM 15 MG/1
TABLET ORAL
Qty: 30 TABLET | Refills: 2 | Status: SHIPPED | OUTPATIENT
Start: 2021-01-04 | End: 2021-08-06

## 2021-04-21 ENCOUNTER — OFFICE VISIT (OUTPATIENT)
Dept: ORTHOPEDIC SURGERY | Age: 66
End: 2021-04-21
Payer: MEDICARE

## 2021-04-21 VITALS — WEIGHT: 196 LBS | BODY MASS INDEX: 33.46 KG/M2 | HEIGHT: 64 IN

## 2021-04-21 DIAGNOSIS — M25.561 ACUTE PAIN OF RIGHT KNEE: ICD-10-CM

## 2021-04-21 DIAGNOSIS — M25.562 ACUTE PAIN OF LEFT KNEE: Primary | ICD-10-CM

## 2021-04-21 PROCEDURE — 4040F PNEUMOC VAC/ADMIN/RCVD: CPT | Performed by: ORTHOPAEDIC SURGERY

## 2021-04-21 PROCEDURE — 3017F COLORECTAL CA SCREEN DOC REV: CPT | Performed by: ORTHOPAEDIC SURGERY

## 2021-04-21 PROCEDURE — 1090F PRES/ABSN URINE INCON ASSESS: CPT | Performed by: ORTHOPAEDIC SURGERY

## 2021-04-21 PROCEDURE — G8417 CALC BMI ABV UP PARAM F/U: HCPCS | Performed by: ORTHOPAEDIC SURGERY

## 2021-04-21 PROCEDURE — 1036F TOBACCO NON-USER: CPT | Performed by: ORTHOPAEDIC SURGERY

## 2021-04-21 PROCEDURE — 99203 OFFICE O/P NEW LOW 30 MIN: CPT | Performed by: ORTHOPAEDIC SURGERY

## 2021-04-21 PROCEDURE — 1123F ACP DISCUSS/DSCN MKR DOCD: CPT | Performed by: ORTHOPAEDIC SURGERY

## 2021-04-21 PROCEDURE — 20610 DRAIN/INJ JOINT/BURSA W/O US: CPT | Performed by: ORTHOPAEDIC SURGERY

## 2021-04-21 PROCEDURE — G8428 CUR MEDS NOT DOCUMENT: HCPCS | Performed by: ORTHOPAEDIC SURGERY

## 2021-04-21 PROCEDURE — G8400 PT W/DXA NO RESULTS DOC: HCPCS | Performed by: ORTHOPAEDIC SURGERY

## 2021-04-21 RX ORDER — BUPIVACAINE HYDROCHLORIDE 2.5 MG/ML
2 INJECTION, SOLUTION INFILTRATION; PERINEURAL ONCE
Status: COMPLETED | OUTPATIENT
Start: 2021-04-21 | End: 2021-04-22

## 2021-04-21 RX ORDER — LIDOCAINE HYDROCHLORIDE 10 MG/ML
1 INJECTION, SOLUTION INFILTRATION; PERINEURAL ONCE
Status: COMPLETED | OUTPATIENT
Start: 2021-04-21 | End: 2021-04-22

## 2021-04-21 RX ORDER — TRIAMCINOLONE ACETONIDE 40 MG/ML
3 INJECTION, SUSPENSION INTRA-ARTICULAR; INTRAMUSCULAR ONCE
Status: COMPLETED | OUTPATIENT
Start: 2021-04-21 | End: 2021-04-22

## 2021-04-21 NOTE — PROGRESS NOTES
Subjective    Patient ID: Deepak James is a 73 y.o. [facility] female. Chief Complaint   Patient presents with    Knee Pain     Bilateral knee pain 5/10 today, gets worse throughout day         Pt has been previously diagnosed with osteoarthritis of the knee as documented in the prior progress notes. This injection is for the patients Bilateral knee. Patient denies and fevers, chills, recent infections, or new injuries to the knee. stair are aorese for her with left knee with worsening compared to the right side. Heel pain on the right side with potential relation to the left side worsening. On meloxicam with at least some improved pain tolerance. Several weeks ago flare up with in shower difficulty of swelling and pain. Patient's medications, allergies, past medical, surgical, social and family histories were reviewed and updated as appropriate. Physical Exam:    The patient does not have  pain on motion, there is not an effusion, there is tenderness over the  medial, lateral region. No erythema noted. Sensation intact to touch. Pulses present distally. Chief Complaint:   Chief Complaint   Patient presents with    Knee Pain     Bilateral knee pain 5/10 today, gets worse throughout day               Medical History  Current Medications:   Prior to Admission medications    Medication Sig Start Date End Date Taking? Authorizing Provider   meloxicam (MOBIC) 15 MG tablet TAKE 1 TABLET BY MOUTH EVERY DAY AS NEEDED FOR PAIN 21   Sheldon Gandhi DO   meloxicam (MOBIC) 15 MG tablet TAKE 1 TABLET BY MOUTH EVERY DAY 20   Sheldon Gandhi DO   vitamin D (CHOLECALCIFEROL) 400 UNITS TABS tablet Take 400 Units by mouth daily. Historical Provider, MD   Multiple Vitamin (MULTI-VITAMIN PO) Take  by mouth. Historical Provider, MD     Medical History:  has a past medical history of Hyperlipidemia, Menopause, and  (spontaneous vaginal delivery). Allergies:    Allergies Allergen Reactions    Penicillins        Review of Systems:     Assessment   Vital Signs:     Ht 5' 4\" (1.626 m)   Wt 196 lb (88.9 kg)   LMP  (LMP Unknown)   BMI 33.64 kg/m²     bilateral Knee Examination    Inspection: swelling of the bialteral knees mild. Palpation: Tender to palpation at medial joint. Range of Motion: 5 to 90 on the left. 3 to 95 on the right. Strength:  Hamstrings rated: 4/5. Quadriceps rated: 4/5    Special Tests: Valgus/Varus Stress positive bilaterally       Skin: There are no rashes, ulcerations or lesions. Gait: antalgic         Diagnostic Test Findings:   Medial osteoarthritis of the bialteral knees with medial      Procedure(s):   Injection Procedure Note  Procedure Details     Verbal consent was obtained for the procedure. The right knee(s) was prepped with iodine and the skin was anesthetized. Using a 22 gauge needle the right knee(s) joint is injected with 2 ml 1% lidocaine and 2 ml of triamcinolone (KENALOG) 40mg/ml under the lateral aspect of the knee. The injection site was cleansed with topical isopropyl alcohol and a dressing was applied. Complications:  None; patient tolerated the procedure well. Injection Procedure Note  Procedure Details     Verbal consent was obtained for the procedure. The left knee(s) was prepped with iodine and the skin was anesthetized. Using a 22 gauge needle the left knee(s) joint is injected with 2 ml 1% lidocaine and 2 ml of triamcinolone (KENALOG) 40mg/ml under the lateral aspect of the knee. The injection site was cleansed with topical isopropyl alcohol and a dressing was applied. Complications:  None; patient tolerated the procedure well. Assessment and Plan:  1. Acute pain of left knee    2. Acute pain of right knee        No follow-ups on file.     The orders below, if any, were placed during this visit:   Orders Placed This Encounter   Procedures    XR KNEE LEFT (MIN 4 VIEWS)    XR KNEE RIGHT (MIN 4 VIEWS) Standing Status:   Future     Number of Occurrences:   1     Standing Expiration Date:   4/21/2022          Treatment Plan:   In view of the fact that non operative measures have not been successful in relieving pain, Partial Knee Replacement was recommended. The procedure, risks and expected outcome were explained, as well as the need for physical therapy after surgery. Risks of surgical intervention including anaesthesia and medical related issues were discussed. Need for the partial vs total knee arthroplasty were also discussed. The potential for eventual conversion of the partial to a total knee replacement were discussed but the benefits of the partial knee in terms of a smaller surgery and potential for faster return to activities were obvious benefits of this potential surgical intervention. During surgery the potential to convert to a total knee arthroplasty because of advanced disease was also discussed as a possibility. Discussion regarding partial knee arthroplasty was based on progressive increase in pain over the past several years. Currently she is busy with household of children and dogs which take a great deal of time and effort. She is unable to consider operative intervention at this time. Would like to see how she does with her current cortisone injection she does not want to proceed with any major level of injections because of resistance to additional injections and previous traumatic injections. We have advised her to come back in another 2 months if her symptoms are more severe to consider operative intervention but would like to at least obtain an x-ray in 6 months time if she decides to delay subsequent intervention. Agree with above history and physical examination. Have seen and examined the patient. Approxiaately 30 minutes minutes for discussion and counseling. Guillermina Rincon MD               Diagnosis Orders   1.  Acute pain of left knee  XR KNEE

## 2021-04-22 RX ADMIN — TRIAMCINOLONE ACETONIDE 3.2 MG: 40 INJECTION, SUSPENSION INTRA-ARTICULAR; INTRAMUSCULAR at 08:49

## 2021-04-22 RX ADMIN — BUPIVACAINE HYDROCHLORIDE 5 MG: 2.5 INJECTION, SOLUTION INFILTRATION; PERINEURAL at 08:47

## 2021-04-22 RX ADMIN — LIDOCAINE HYDROCHLORIDE 1 ML: 10 INJECTION, SOLUTION INFILTRATION; PERINEURAL at 08:50

## 2021-08-06 ENCOUNTER — OFFICE VISIT (OUTPATIENT)
Dept: FAMILY MEDICINE CLINIC | Age: 66
End: 2021-08-06
Payer: MEDICARE

## 2021-08-06 VITALS
BODY MASS INDEX: 33.97 KG/M2 | TEMPERATURE: 96.4 F | DIASTOLIC BLOOD PRESSURE: 74 MMHG | HEART RATE: 94 BPM | OXYGEN SATURATION: 97 % | WEIGHT: 199 LBS | HEIGHT: 64 IN | SYSTOLIC BLOOD PRESSURE: 126 MMHG

## 2021-08-06 DIAGNOSIS — E55.9 VITAMIN D DEFICIENCY: ICD-10-CM

## 2021-08-06 DIAGNOSIS — Z76.89 ENCOUNTER TO ESTABLISH CARE: Primary | ICD-10-CM

## 2021-08-06 DIAGNOSIS — Z83.49 FAMILY HISTORY OF THYROID DISEASE: ICD-10-CM

## 2021-08-06 DIAGNOSIS — R73.9 HYPERGLYCEMIA: ICD-10-CM

## 2021-08-06 DIAGNOSIS — E78.00 PURE HYPERCHOLESTEROLEMIA: ICD-10-CM

## 2021-08-06 DIAGNOSIS — Z12.31 ENCOUNTER FOR SCREENING MAMMOGRAM FOR MALIGNANT NEOPLASM OF BREAST: ICD-10-CM

## 2021-08-06 DIAGNOSIS — Z13.1 SCREENING FOR DIABETES MELLITUS: ICD-10-CM

## 2021-08-06 PROCEDURE — G8427 DOCREV CUR MEDS BY ELIG CLIN: HCPCS | Performed by: REGISTERED NURSE

## 2021-08-06 PROCEDURE — 4040F PNEUMOC VAC/ADMIN/RCVD: CPT | Performed by: REGISTERED NURSE

## 2021-08-06 PROCEDURE — 1036F TOBACCO NON-USER: CPT | Performed by: REGISTERED NURSE

## 2021-08-06 PROCEDURE — 1090F PRES/ABSN URINE INCON ASSESS: CPT | Performed by: REGISTERED NURSE

## 2021-08-06 PROCEDURE — G8417 CALC BMI ABV UP PARAM F/U: HCPCS | Performed by: REGISTERED NURSE

## 2021-08-06 PROCEDURE — 1123F ACP DISCUSS/DSCN MKR DOCD: CPT | Performed by: REGISTERED NURSE

## 2021-08-06 PROCEDURE — 99214 OFFICE O/P EST MOD 30 MIN: CPT | Performed by: REGISTERED NURSE

## 2021-08-06 PROCEDURE — 3017F COLORECTAL CA SCREEN DOC REV: CPT | Performed by: REGISTERED NURSE

## 2021-08-06 PROCEDURE — G8400 PT W/DXA NO RESULTS DOC: HCPCS | Performed by: REGISTERED NURSE

## 2021-08-06 RX ORDER — LORATADINE 10 MG/1
10 CAPSULE, LIQUID FILLED ORAL DAILY
COMMUNITY
End: 2022-06-22

## 2021-08-06 ASSESSMENT — PATIENT HEALTH QUESTIONNAIRE - PHQ9
1. LITTLE INTEREST OR PLEASURE IN DOING THINGS: 0
SUM OF ALL RESPONSES TO PHQ QUESTIONS 1-9: 0
2. FEELING DOWN, DEPRESSED OR HOPELESS: 0
SUM OF ALL RESPONSES TO PHQ9 QUESTIONS 1 & 2: 0

## 2021-08-06 ASSESSMENT — ENCOUNTER SYMPTOMS
ALLERGIC/IMMUNOLOGIC NEGATIVE: 1
SORE THROAT: 0
ABDOMINAL PAIN: 0
COLOR CHANGE: 0
CONSTIPATION: 0
SINUS PRESSURE: 0
BACK PAIN: 0
DIARRHEA: 0
NAUSEA: 0
SHORTNESS OF BREATH: 0
COUGH: 0

## 2021-08-06 NOTE — PROGRESS NOTES
Chief Complaint:   Serge Alejandra is a 73 y.o. female who presents for complete physical exam.      ASSESSMENT:   Well Female exam, See encounter diagnoses  1. Encounter to establish care  Pleasant 78-year-old female here to establish care. Will check labs to rule out thyroid disorder and to evaluate her vitamin D level due to history of deficiency. Mammogram orderpatient encouraged to have mammogram completed. Discussed elevated cholesterol and cardiac risks. Patient is resistant to starting a cholesterol medication. Provided information about red yeast rice supplement. She will be more active once she has her surgery. 2. Pure hypercholesterolemia    - Lipid Panel; Future    3. Screening for diabetes mellitus    - Hemoglobin A1C; Future    4. Vitamin D deficiency    - Vitamin D 25 Hydroxy; Future    5. Hyperglycemia    - Hemoglobin A1C; Future    6. Encounter for screening mammogram for malignant neoplasm of breast    - KWABENA DIGITAL SCREEN W OR WO CAD BILATERAL; Future    7. Family history of thyroid disease    - TSH WITH REFLEX TO FT4; Future       Plan:   See orders and medications filed with this encounter. begin progressive daily aerobic exercise program, follow a low fat, low cholesterol diet, use calcium 1 gram daily with Vit D, continue current medications, continue current healthy lifestyle patterns and return for routine annual checkups   Encouraged healthy diet, regular exercise and multivitamin daily. Labs checked per orders. Optho visit q 1-2 years. Watch for skin mole changes. Colonoscopy if over age 48 or if family history was discussed. Pap encouraged yearly, mammo encouraged yearly. Vaccines ordered today per orders. Calcium 7437-2920 mg a day with vitamin D along with weight bearing exercises to prevent osteoporosis. Return in about 6 months (around 2/6/2022) for Cholesterol. HPI:      She is here to establish care. Overall she feels that her health is good.   She has had some recent stressors due to having family live with her temporarily. As this is getting better. She has chronic bilateral knee pain due to osteoarthritis. She states that she has seen an Ortho for this and was recommended to have bilateral total knee replacements. She says that she is going to have her for surgery in the next couple of months. She says that this has made it difficult for her to exercise and feels that this is part of why her cholesterol is high. She says there is a history of thyroid disorder in her family. Her sister and her niece both have thyroid disorder. She says that she has been through menopause and she did have hot flashes while going through menopause. She says that now she feels that she \"runs hot\" and gets hot very easily. Review of Systems   Constitutional: Positive for diaphoresis. Negative for fatigue and fever. HENT: Negative for congestion, postnasal drip, sinus pressure and sore throat. Eyes: Negative for visual disturbance. Respiratory: Negative for cough and shortness of breath. Cardiovascular: Negative for chest pain and palpitations. Gastrointestinal: Negative for abdominal pain, constipation, diarrhea and nausea. Endocrine: Negative for cold intolerance, heat intolerance, polydipsia, polyphagia and polyuria. Genitourinary: Negative for difficulty urinating, dysuria and hematuria. Musculoskeletal: Positive for arthralgias ( Bilateral knee painpatient states she will be having knee replacements in both knees in the near future). Negative for back pain, myalgias and neck pain. Skin: Negative for color change, pallor and rash. Allergic/Immunologic: Negative. Neurological: Negative for light-headedness and headaches. Hematological: Negative for adenopathy. Psychiatric/Behavioral: Negative for dysphoric mood and sleep disturbance. The patient is not nervous/anxious.         Physical Exam    Vitals:    08/06/21 1519 08/06/21 1605 BP: (!) 144/80 126/74   Pulse: 94    Temp: 96.4 °F (35.8 °C)    TempSrc: Temporal    SpO2: 97%    Weight: 199 lb (90.3 kg)    Height: 5' 4\" (1.626 m)           Patient Active Problem List   Diagnosis    Pure hypercholesterolemia    Vitamin D deficiency    Primary osteoarthritis of both knees     Past Medical History:   Diagnosis Date    Hyperlipidemia     Menopause     Menopause      (spontaneous vaginal delivery)            Past Surgical History:   Procedure Laterality Date    COLONOSCOPY  02/15/2001, 2006, 10/1/12     Most Recent Immunizations   Administered Date(s) Administered    COVID-19, Moderna, PF, 100mcg/0.5mL 2021    Influenza, MDCK Quadv, IM, PF (Flucelvax 4 yrs and older) 2020    Influenza, Quadv, IM, PF (6 mo and older Fluzone, Flulaval, Fluarix, and 3 yrs and older Afluria) 2019    Td, unspecified formulation 2018    Tdap (Boostrix, Adacel) 2007        Current Outpatient Medications   Medication Sig Dispense Refill    loratadine (CLARITIN) 10 MG capsule Take 10 mg by mouth daily      diclofenac (VOLTAREN) 50 MG EC tablet Take 1 tablet by mouth 2 times daily (with meals) 60 tablet 3    vitamin D (CHOLECALCIFEROL) 400 UNITS TABS tablet Take 400 Units by mouth daily.  Multiple Vitamin (MULTI-VITAMIN PO) Take  by mouth. No current facility-administered medications for this visit.      Allergies   Allergen Reactions    Penicillins        Social History     Socioeconomic History    Marital status:      Spouse name: None    Number of children: None    Years of education: None    Highest education level: None   Occupational History    None   Tobacco Use    Smoking status: Never Smoker    Smokeless tobacco: Never Used   Substance and Sexual Activity    Alcohol use: No    Drug use: No    Sexual activity: Yes     Partners: Male     Comment: husb = Lorenzo   Other Topics Concern    None   Social History Narrative    None Social Determinants of Health     Financial Resource Strain:     Difficulty of Paying Living Expenses:    Food Insecurity:     Worried About Running Out of Food in the Last Year:     [address] in the Last Year:    Transportation Needs:     Lack of Transportation (Medical):  Lack of Transportation (Non-Medical):    Physical Activity:     Days of Exercise per Week:     Minutes of Exercise per Session:    Stress:     Feeling of Stress :    Social Connections:     Frequency of Communication with Friends and Family:     Frequency of Social Gatherings with Friends and Family:     Attends Jew Services:     Active Member of Clubs or Organizations:     Attends Club or Organization Meetings:     Marital Status:    Intimate Partner Violence:     Fear of Current or Ex-Partner:     Emotionally Abused:     Physically Abused:     Sexually Abused:      Family History   Problem Relation Age of Onset    Cancer Mother         colon cancer/cervical cancer    Cataracts Mother     High Cholesterol Father     Cataracts Father     Cancer Brother         anal cancer          This chart was generated using the [employer] dictation system. I created this record but it may contain dictation errors due to the limitation of the software.

## 2021-08-10 DIAGNOSIS — E78.00 HYPERCHOLESTEROLEMIA: Primary | ICD-10-CM

## 2021-08-10 RX ORDER — ATORVASTATIN CALCIUM 10 MG/1
10 TABLET, FILM COATED ORAL DAILY
Qty: 30 TABLET | Refills: 5 | Status: SHIPPED | OUTPATIENT
Start: 2021-08-10 | End: 2021-08-25 | Stop reason: SDUPTHER

## 2021-08-20 ENCOUNTER — HOSPITAL ENCOUNTER (OUTPATIENT)
Dept: WOMENS IMAGING | Age: 66
Discharge: HOME | End: 2021-08-20
Payer: MEDICARE

## 2021-08-20 DIAGNOSIS — Z12.31 ENCOUNTER FOR SCREENING MAMMOGRAM FOR MALIGNANT NEOPLASM OF BREAST: ICD-10-CM

## 2021-08-20 PROCEDURE — 77067 SCR MAMMO BI INCL CAD: CPT

## 2021-08-23 ENCOUNTER — OFFICE VISIT (OUTPATIENT)
Dept: ORTHOPEDIC SURGERY | Age: 66
End: 2021-08-23
Payer: MEDICARE

## 2021-08-23 VITALS — HEIGHT: 64 IN | BODY MASS INDEX: 33.97 KG/M2 | WEIGHT: 199 LBS

## 2021-08-23 DIAGNOSIS — M25.571 ACUTE RIGHT ANKLE PAIN: ICD-10-CM

## 2021-08-23 DIAGNOSIS — M65.28 CALCIFIC ACHILLES TENDINITIS OF RIGHT LOWER EXTREMITY: Primary | ICD-10-CM

## 2021-08-23 PROCEDURE — G8400 PT W/DXA NO RESULTS DOC: HCPCS | Performed by: ORTHOPAEDIC SURGERY

## 2021-08-23 PROCEDURE — G8427 DOCREV CUR MEDS BY ELIG CLIN: HCPCS | Performed by: ORTHOPAEDIC SURGERY

## 2021-08-23 PROCEDURE — 99213 OFFICE O/P EST LOW 20 MIN: CPT | Performed by: ORTHOPAEDIC SURGERY

## 2021-08-23 PROCEDURE — 1090F PRES/ABSN URINE INCON ASSESS: CPT | Performed by: ORTHOPAEDIC SURGERY

## 2021-08-23 PROCEDURE — 3017F COLORECTAL CA SCREEN DOC REV: CPT | Performed by: ORTHOPAEDIC SURGERY

## 2021-08-23 PROCEDURE — 1123F ACP DISCUSS/DSCN MKR DOCD: CPT | Performed by: ORTHOPAEDIC SURGERY

## 2021-08-23 PROCEDURE — 1036F TOBACCO NON-USER: CPT | Performed by: ORTHOPAEDIC SURGERY

## 2021-08-23 PROCEDURE — 4040F PNEUMOC VAC/ADMIN/RCVD: CPT | Performed by: ORTHOPAEDIC SURGERY

## 2021-08-23 PROCEDURE — G8417 CALC BMI ABV UP PARAM F/U: HCPCS | Performed by: ORTHOPAEDIC SURGERY

## 2021-08-23 NOTE — PROGRESS NOTES
ORTHOPAEDIC SURGERY H&P / CONSULTATION NOTE    Chief complaint:   Chief Complaint   Patient presents with    Ankle Pain     right ankle pain        History of present illness: The patient is a 73 y.o. female with subjective symptoms of posterior right ankle pain. The chief complaint is located at posterior right ankle. Duration of symptoms has been for 4 days on again off again over the last week or 2. The severity of symptoms is rated at 2/10 pain on intake form. Patient states posterior ankle pain. She denies trauma but does remember going up the stairs and felt some discomfort and pain in the posterior aspect of her ankle. She states occasional feeling of instability but this is improved. She still has some achiness that is associated. She has been wearing a little neoprene knee sleeve which she states helped and she does use a cane but primarily for knee arthritis. She sees Dr. Sergio Ricoutant and is considering a potential knee replacement. She has not done any therapy but does take oral Voltaren. She denies previous injury to the ankles otherwise    The patient has tried the below listed items prior to today's consultation for above listed chief complaint.     +    Over-the-counter anti-inflammatories/prescription medication anti-inflammatory. -   Physical therapy / guided home exercise program -     -   Previous corticosteroid injections    Past medical history:    Past Medical History:   Diagnosis Date    Hyperlipidemia     Menopause     Menopause      (spontaneous vaginal delivery)             Past surgical history:    Past Surgical History:   Procedure Laterality Date    COLONOSCOPY  02/15/2001, 2006, 10/1/12        Allergies:     Allergies   Allergen Reactions    Penicillins          Medications:   Current Outpatient Medications:     loratadine (CLARITIN) 10 MG capsule, Take 10 mg by mouth daily, Disp: , Rfl:     diclofenac (VOLTAREN) 50 MG EC tablet, Take 1 tablet by mouth 2 times daily (with meals), Disp: 60 tablet, Rfl: 3    vitamin D (CHOLECALCIFEROL) 400 UNITS TABS tablet, Take 400 Units by mouth daily. , Disp: , Rfl:     Multiple Vitamin (MULTI-VITAMIN PO), Take  by mouth., Disp: , Rfl:     atorvastatin (LIPITOR) 10 MG tablet, Take 1 tablet by mouth daily (Patient not taking: Reported on 8/23/2021), Disp: 30 tablet, Rfl: 5     Social history: Denies IV drug use. Social History     Socioeconomic History    Marital status:      Spouse name: Not on file    Number of children: Not on file    Years of education: Not on file    Highest education level: Not on file   Occupational History    Not on file   Tobacco Use    Smoking status: Never Smoker    Smokeless tobacco: Never Used   Substance and Sexual Activity    Alcohol use: No    Drug use: No    Sexual activity: Yes     Partners: Male     Comment: miranda = Lorenzo   Other Topics Concern    Not on file   Social History Narrative    Not on file     Social Determinants of Health     Financial Resource Strain:     Difficulty of Paying Living Expenses:    Food Insecurity:     Worried About Running Out of Food in the Last Year:     [address] in the Last Year:    Transportation Needs:     Lack of Transportation (Medical):  Lack of Transportation (Non-Medical):    Physical Activity:     Days of Exercise per Week:     Minutes of Exercise per Session:    Stress:     Feeling of Stress :    Social Connections:     Frequency of Communication with Friends and Family:     Frequency of Social Gatherings with Friends and Family:     Attends [employer] Services:     Active Member of Clubs or Organizations:     Attends Club or Organization Meetings:     Marital Status:    Intimate Partner Violence:     Fear of Current or Ex-Partner:     Emotionally Abused:     Physically Abused:     Sexually Abused: Tobacco use.     Social History     Tobacco Use   Smoking Status Never Smoker   Smokeless Tobacco Never Used     Employment: Noncontributory    Workers compensation claim: Noncontributory    Review of systems: Patient denies any fevers chills chest pain shortness of breath nausea vomiting significant weight loss any change in voiding or bowel movements. Patient denies any significant numbness or tingling at baseline as it relates to this presenting symptom/chief complaint. The patient denies any significant problems with skin or any significant allergies. Physical examination:  Body mass index is 34.16 kg/m². AAOx3, NCAT  EOMI  MMM  RR  Unlabored breathing, no wheezing  Skin intact BUE and BLE, warm and moist  Right ankle: Positive tenderness palpation over subtle swelling posterior Achilles tendon. No palpable gap. Negative Rojas sign. Slight tenderness to palpation at insertion as well. Skin intact throughout  5/5 IP Q H TA G EHL  SILT DP SP LP MP S S  +2 DP pulse    Diagnostic imaging:  MY READ:  3 view left ankle 8/23/2021: Negative fracture. Positive insertional calcific tendinosis as well as calcific tendinosis in the substance of the Achilles tendon    Pertinent lab work: None     Diagnosis Orders   1. Calcific Achilles tendinitis of right lower extremity     2. Acute right ankle pain  XR ANKLE RIGHT (MIN 3 VIEWS)    [facility] PT - [facility]       Assessment and plan: 73 y.o. female with current subjective symptoms and physical exam findings with diagnostic imaging correlating to left ankle calcific Achilles tendinitis. -Time of 16 minutes was spent coordinating and discussing the clinical findings, reviewing diagnostic imaging as indicated, coordinating care with prior notes review and current clinical encounter documentation as it pertains to the patient's presenting subjective symptoms and diagnoses. -I reviewed with the patient the imaging findings as well as clinical exam and  how it correlates to subjective symptoms.  -I had a pleasant discussion with the patient.   I reviewed with her that currently her symptoms correlate to calcific tendinosis of the Achilles tendon. I reviewed with her the etiology and overall findings and reviewed with her directly her radiographic images.  -Currently she feels that her ankle pain is doing better. I reviewed with her that should it be worsening, then consideration for placing her in a cam walker boot with wedges. Currently she does not feel that is needed given her symptoms are improving with a neoprene sleeve and also baseline cane use. -I recommended that she would like to take a break with the oral Voltaren, then she may take Voltaren topical gel and applied to the Achilles which might be more beneficial given the superficial nature and also topical ointment. She may also take over-the-counter Tylenol per bottle as needed discomfort  -I did recommend and have provided physical therapy to work on eccentric stretching activities to include not on the Achilles tendon but also the plantar fascia.  -All questions answered to the patient's satisfaction and the patient expressed understanding and agreement with the above listed treatment plan  -Follow up as needed  -Thank you for the clinical consultation and allowing me to participate in the patient's care. Electronically signed by Pauline Lan MD on 8/23/21 at 10:26 AM AB Lan MD       Orthopaedic Surgery-Sports Medicine        Disclaimer: This note was dictated with voice recognition software. Though review and correction are routinely performed, please contact the office/medical records for any errors requiring correction.

## 2021-08-25 DIAGNOSIS — E78.00 HYPERCHOLESTEROLEMIA: ICD-10-CM

## 2021-08-25 RX ORDER — ATORVASTATIN CALCIUM 10 MG/1
10 TABLET, FILM COATED ORAL DAILY
Qty: 90 TABLET | Refills: 1 | Status: SHIPPED | OUTPATIENT
Start: 2021-08-25 | End: 2022-06-22

## 2021-08-25 NOTE — TELEPHONE ENCOUNTER
Fax from pharmacy requesting a 90 day supply for Atorvastatin 10 mg Tab    Send to [facility].     Last Office Visit  -  8.6.21 TL NTP  Next Office Visit  -

## 2021-09-02 ENCOUNTER — OFFICE VISIT (OUTPATIENT)
Dept: ORTHOPEDIC SURGERY | Age: 66
End: 2021-09-02
Payer: MEDICARE

## 2021-09-02 VITALS
DIASTOLIC BLOOD PRESSURE: 81 MMHG | SYSTOLIC BLOOD PRESSURE: 157 MMHG | HEIGHT: 64 IN | BODY MASS INDEX: 33.97 KG/M2 | HEART RATE: 74 BPM | WEIGHT: 199 LBS

## 2021-09-02 DIAGNOSIS — S86.011S ACHILLES TENDON TEAR, RIGHT, SEQUELA: ICD-10-CM

## 2021-09-02 DIAGNOSIS — M17.0 PRIMARY OSTEOARTHRITIS OF BOTH KNEES: Primary | ICD-10-CM

## 2021-09-02 DIAGNOSIS — M65.28 CALCIFIC ACHILLES TENDINITIS OF RIGHT LOWER EXTREMITY: ICD-10-CM

## 2021-09-02 PROCEDURE — 1123F ACP DISCUSS/DSCN MKR DOCD: CPT | Performed by: ORTHOPAEDIC SURGERY

## 2021-09-02 PROCEDURE — G8417 CALC BMI ABV UP PARAM F/U: HCPCS | Performed by: ORTHOPAEDIC SURGERY

## 2021-09-02 PROCEDURE — E0114 CRUTCH UNDERARM PAIR NO WOOD: HCPCS | Performed by: ORTHOPAEDIC SURGERY

## 2021-09-02 PROCEDURE — 29405 APPL SHORT LEG CAST: CPT | Performed by: ORTHOPAEDIC SURGERY

## 2021-09-02 PROCEDURE — G8427 DOCREV CUR MEDS BY ELIG CLIN: HCPCS | Performed by: ORTHOPAEDIC SURGERY

## 2021-09-02 PROCEDURE — 3017F COLORECTAL CA SCREEN DOC REV: CPT | Performed by: ORTHOPAEDIC SURGERY

## 2021-09-02 PROCEDURE — 1036F TOBACCO NON-USER: CPT | Performed by: ORTHOPAEDIC SURGERY

## 2021-09-02 PROCEDURE — 1090F PRES/ABSN URINE INCON ASSESS: CPT | Performed by: ORTHOPAEDIC SURGERY

## 2021-09-02 PROCEDURE — 4040F PNEUMOC VAC/ADMIN/RCVD: CPT | Performed by: ORTHOPAEDIC SURGERY

## 2021-09-02 PROCEDURE — 99214 OFFICE O/P EST MOD 30 MIN: CPT | Performed by: ORTHOPAEDIC SURGERY

## 2021-09-02 PROCEDURE — G8400 PT W/DXA NO RESULTS DOC: HCPCS | Performed by: ORTHOPAEDIC SURGERY

## 2021-09-02 NOTE — PROGRESS NOTES
Subjective    Patient ID: Jasmin Ocasio is a 73 y.o. [place] female. Chief Complaint   Patient presents with    Knee Pain     Bilateral knee   fall with injury of the right ankle. Left knee gave out. Several months of pain in the medial ankle. At one point in the grocery store was unable to get out because of increase in pain in the back of the right ankle. Most recently one-2 weeks ago had stepped up a stiar and noted marked pain. When discussing tearing sensation she noted this may have occurred at the grocery. Mild swelling at the ankle but no obvious bruising. Knees have been worsening. Previously:    Pt has been previously diagnosed with osteoarthritis of the knee as documented in the prior progress notes. This injection is for the patients Bilateral knee. Patient denies and fevers, chills, recent infections, or new injuries to the knee. stair are aorese for her with left knee with worsening compared to the right side. Heel pain on the right side with potential relation to the left side worsening. On meloxicam with at least some improved pain tolerance. Several weeks ago flare up with in shower difficulty of swelling and pain. Patient's medications, allergies, past medical, surgical, social and family histories were reviewed and updated as appropriate. Physical Exam:    The patient does not have  pain on motion, there is not an effusion, there is tenderness over the  medial, lateral region. No erythema noted. Sensation intact to touch. Pulses present distally. Chief Complaint:   Chief Complaint   Patient presents with    Knee Pain     Bilateral knee               Medical History  Current Medications:   Prior to Admission medications    Medication Sig Start Date End Date Taking?  Authorizing Provider   Red Yeast Rice Extract (RED YEAST RICE PO) Take by mouth   Yes Historical Provider, MD   atorvastatin (LIPITOR) 10 MG tablet Take 1 tablet by mouth daily 21   ADITI Painting - CNP   loratadine (CLARITIN) 10 MG capsule Take 10 mg by mouth daily    Historical Provider, MD   diclofenac (VOLTAREN) 50 MG EC tablet Take 1 tablet by mouth 2 times daily (with meals) 21   Argenis Healy MD   vitamin D (CHOLECALCIFEROL) 400 UNITS TABS tablet Take 400 Units by mouth daily. Historical Provider, MD   Multiple Vitamin (MULTI-VITAMIN PO) Take  by mouth. Historical Provider, MD     Medical History:  has a past medical history of Hyperlipidemia, Menopause, Menopause, and  (spontaneous vaginal delivery). Allergies: Allergies   Allergen Reactions    Penicillins        Review of Systems:     Assessment   Vital Signs:     BP (!) 157/81   Pulse 74   Ht 5' 4\" (1.626 m)   Wt 199 lb (90.3 kg)   LMP  (LMP Unknown)   BMI 34.16 kg/m²     bilateral Knee Examination    Inspection: swelling of the bialteral knees mild. Palpation: Tender to palpation at medial joint. Range of Motion: 5 to 90 on the left. 3 to 95 on the right. Strength:  Hamstrings rated: 4/5. Quadriceps rated: 4/5    Special Tests: Valgus/Varus Stress positive bilaterally       Skin: There are no rashes, ulcerations or lesions. Gait: antalgic  Achilles testing of the right side with noted defect in the distal aspect of the achilles. continutiy appears to still be present. Rojas sign negative. Diagnostic Test Findings:   Medial osteoarthritis of the bialteral knees with medial       Assessment and Plan:  1. Primary osteoarthritis of both knees        No follow-ups on file. The orders below, if any, were placed during this visit:   No orders of the defined types were placed in this encounter. Treatment Plan:    achilles tear on the right side but unclear issues about when this occurred. Pain in the knees controlled. Irritation of the left knee and give way have not occurred recently.          Have applied short leg cast in plantarflexion to allow achilles potential to heal and approximate. Start on crutches with training in our office. Plan for recheck in 3 weeks for cast off and use of cast boot. Agree with above history and physical examination. Have seen and examined the patient. Approxiaately 30 minutes minutes for discussion and counseling. Ciro Moreno MD               Diagnosis Orders   1.  Primary osteoarthritis of both knees         None  Assessment:  1)  Euflexxa  injection of the Bilateral knee  2) Osteoarthritis    Plan:  1) ice, elevation, gentle range of motion as needed for swelling or stiffness of the knee  2) NSAIDs for any pain after injection   3) F/U 6 months PRN      iCro Moreno MD

## 2021-09-23 ENCOUNTER — OFFICE VISIT (OUTPATIENT)
Dept: ORTHOPEDIC SURGERY | Age: 66
End: 2021-09-23
Payer: MEDICARE

## 2021-09-23 VITALS
DIASTOLIC BLOOD PRESSURE: 84 MMHG | HEART RATE: 79 BPM | WEIGHT: 199 LBS | HEIGHT: 64 IN | BODY MASS INDEX: 33.97 KG/M2 | SYSTOLIC BLOOD PRESSURE: 155 MMHG

## 2021-09-23 DIAGNOSIS — S86.011S ACHILLES TENDON TEAR, RIGHT, SEQUELA: Primary | ICD-10-CM

## 2021-09-23 DIAGNOSIS — M17.0 PRIMARY OSTEOARTHRITIS OF BOTH KNEES: ICD-10-CM

## 2021-09-23 PROCEDURE — G8417 CALC BMI ABV UP PARAM F/U: HCPCS | Performed by: ORTHOPAEDIC SURGERY

## 2021-09-23 PROCEDURE — 3017F COLORECTAL CA SCREEN DOC REV: CPT | Performed by: ORTHOPAEDIC SURGERY

## 2021-09-23 PROCEDURE — 1090F PRES/ABSN URINE INCON ASSESS: CPT | Performed by: ORTHOPAEDIC SURGERY

## 2021-09-23 PROCEDURE — 1036F TOBACCO NON-USER: CPT | Performed by: ORTHOPAEDIC SURGERY

## 2021-09-23 PROCEDURE — 1123F ACP DISCUSS/DSCN MKR DOCD: CPT | Performed by: ORTHOPAEDIC SURGERY

## 2021-09-23 PROCEDURE — 4040F PNEUMOC VAC/ADMIN/RCVD: CPT | Performed by: ORTHOPAEDIC SURGERY

## 2021-09-23 PROCEDURE — L4361 PNEUMA/VAC WALK BOOT PRE OTS: HCPCS | Performed by: ORTHOPAEDIC SURGERY

## 2021-09-23 PROCEDURE — G8400 PT W/DXA NO RESULTS DOC: HCPCS | Performed by: ORTHOPAEDIC SURGERY

## 2021-09-23 PROCEDURE — 99214 OFFICE O/P EST MOD 30 MIN: CPT | Performed by: ORTHOPAEDIC SURGERY

## 2021-09-23 PROCEDURE — G8427 DOCREV CUR MEDS BY ELIG CLIN: HCPCS | Performed by: ORTHOPAEDIC SURGERY

## 2021-09-27 NOTE — PROGRESS NOTES
No follow-ups on file. The orders below, if any, were placed during this visit:   Orders Placed This Encounter   Procedures    Airselect Tall Pneumatic Walking Boot     Patient was prescribed a Mardelle Box Tall Walking Boot. The right ankle will require immobilization from this rigid orthosis to improve their function. The orthosis will assist in protecting the affected area, provide functional support and facilitate healing. Patient was instructed to progress ambulationweight bearing as tolerated in the device. The plan of care is to progress the patient to full weight bearing status. The patient was educated and fit by a healthcare professional with expert knowledge and specialization in brace application while under the direct supervision of the physician. Verbal and written instructions for the use of and application of this item were provided. They were instructed to contact the office immediately should the brace result in increased pain, decreased sensation, increased swelling or worsening of the condition. Treatment Plan:    achilles tear on the right side but unclear issues about when this occurred. Pain in the knees controlled. Irritation of the left knee and give way have not occurred recently. Cast boot was applied in the office with a small shoe lift and recommendations for gradual weightbearing and increasing dorsiflexion of her foot. Would like to see her back in 3 weeks time for recheck and potentially the start of physical therapy to aggressively pursue strengthening of the Achilles and calf    The Achilles is improved gait and analysis and further aggressive strengthening and management of her knees would be next steps  Agree with above history and physical examination. Have seen and examined the patient. Approxiaately 30 minutes minutes for discussion and counseling. Jay Ventura MD               Diagnosis Orders   1.  Achilles tendon tear, right, sequela  Airselect Tall Pneumatic Walking Boot   2.  Primary osteoarthritis of both knees         PNEUMA/VAC WALK BOOT PRE OTS  Assessment:  1)  Euflexxa  injection of the Bilateral knee  2) Osteoarthritis    Plan:  1) ice, elevation, gentle range of motion as needed for swelling or stiffness of the knee  2) NSAIDs for any pain after injection   3) F/U 6 months PRN      Jay Ventura MD

## 2021-10-07 ENCOUNTER — OFFICE VISIT (OUTPATIENT)
Dept: ORTHOPEDIC SURGERY | Age: 66
End: 2021-10-07
Payer: MEDICARE

## 2021-10-07 VITALS
WEIGHT: 199 LBS | SYSTOLIC BLOOD PRESSURE: 148 MMHG | DIASTOLIC BLOOD PRESSURE: 86 MMHG | HEIGHT: 64 IN | BODY MASS INDEX: 33.97 KG/M2 | HEART RATE: 76 BPM

## 2021-10-07 DIAGNOSIS — S86.011S ACHILLES TENDON TEAR, RIGHT, SEQUELA: Primary | ICD-10-CM

## 2021-10-07 PROCEDURE — G8427 DOCREV CUR MEDS BY ELIG CLIN: HCPCS | Performed by: PHYSICIAN ASSISTANT

## 2021-10-07 PROCEDURE — G8417 CALC BMI ABV UP PARAM F/U: HCPCS | Performed by: PHYSICIAN ASSISTANT

## 2021-10-07 PROCEDURE — 99214 OFFICE O/P EST MOD 30 MIN: CPT | Performed by: PHYSICIAN ASSISTANT

## 2021-10-07 PROCEDURE — 3017F COLORECTAL CA SCREEN DOC REV: CPT | Performed by: PHYSICIAN ASSISTANT

## 2021-10-07 PROCEDURE — 1036F TOBACCO NON-USER: CPT | Performed by: PHYSICIAN ASSISTANT

## 2021-10-07 PROCEDURE — 1090F PRES/ABSN URINE INCON ASSESS: CPT | Performed by: PHYSICIAN ASSISTANT

## 2021-10-07 PROCEDURE — 4040F PNEUMOC VAC/ADMIN/RCVD: CPT | Performed by: PHYSICIAN ASSISTANT

## 2021-10-07 PROCEDURE — 1123F ACP DISCUSS/DSCN MKR DOCD: CPT | Performed by: PHYSICIAN ASSISTANT

## 2021-10-07 PROCEDURE — G8484 FLU IMMUNIZE NO ADMIN: HCPCS | Performed by: PHYSICIAN ASSISTANT

## 2021-10-07 PROCEDURE — G8400 PT W/DXA NO RESULTS DOC: HCPCS | Performed by: PHYSICIAN ASSISTANT

## 2021-10-07 NOTE — PROGRESS NOTES
Subjective    Patient ID: Timoteo Peabody is a 73 y.o. [place] female. Chief Complaint   Patient presents with    Ankle Pain     RIGHT ANKLE     Currently: 10/7/2021  Patient is here for follow-up regarding partial Achilles tear/tendinitis. He notes overall improvement in regards to pain and function she is still utilizing the boot and weightbearing only with utilization of the boot. She has not been doing a lot of exercises in regards to the ankle as she has not been advised to at present time she is hopeful to begin doing so she is going stir crazy. Denies any fall trauma or injury denies numbness burning tingling radiating pains down the leg. Rates overall pain is in the Achilles tendon is a 1 out of 10. Previously: 9/23/2021  He is done well in the cast her knee pains have been fairly well contained. Cast will be removed today. She has been walking on her toes and has not noticed any increased pain in fact her pain is been decreasing      Previously:      fall with injury of the right ankle. Left knee gave out. Several months of pain in the medial ankle. At one point in the grocery store was unable to get out because of increase in pain in the back of the right ankle. Most recently one-2 weeks ago had stepped up a stiar and noted marked pain. When discussing tearing sensation she noted this may have occurred at the grocery. Mild swelling at the ankle but no obvious bruising. Knees have been worsening. Previously:    Pt has been previously diagnosed with osteoarthritis of the knee as documented in the prior progress notes. This injection is for the patients Bilateral knee. Patient denies and fevers, chills, recent infections, or new injuries to the knee. stair are aorese for her with left knee with worsening compared to the right side. Heel pain on the right side with potential relation to the left side worsening. On meloxicam with at least some improved pain tolerance. Several weeks ago flare up with in shower difficulty of swelling and pain. Patient's medications, allergies, past medical, surgical, social and family histories were reviewed and updated as appropriate. Physical Exam:    The patient does not have  pain on motion, there is not an effusion, there is tenderness over the  medial, lateral region. No erythema noted. Sensation intact to touch. Pulses present distally. Chief Complaint:   Chief Complaint   Patient presents with    Ankle Pain     RIGHT ANKLE               Medical History  Current Medications:   Prior to Admission medications    Medication Sig Start Date End Date Taking? Authorizing Provider   Red Yeast Rice Extract (RED YEAST RICE PO) Take by mouth    Historical Provider, MD   atorvastatin (LIPITOR) 10 MG tablet Take 1 tablet by mouth daily 21   ADITI Meeks - CNP   loratadine (CLARITIN) 10 MG capsule Take 10 mg by mouth daily    Historical Provider, MD   diclofenac (VOLTAREN) 50 MG EC tablet Take 1 tablet by mouth 2 times daily (with meals) 21   Alley Choi MD   vitamin D (CHOLECALCIFEROL) 400 UNITS TABS tablet Take 400 Units by mouth daily. Historical Provider, MD   Multiple Vitamin (MULTI-VITAMIN PO) Take  by mouth. Historical Provider, MD     Medical History:  has a past medical history of Hyperlipidemia, Menopause, Menopause, and  (spontaneous vaginal delivery). Allergies: Allergies   Allergen Reactions    Penicillins        Review of Systems:     Assessment   Vital Signs:     BP (!) 148/86   Pulse 76   Ht 5' 4\" (1.626 m)   Wt 199 lb (90.3 kg)   LMP  (LMP Unknown)   BMI 34.16 kg/m²     bilateral Knee Examination    Inspection: swelling of the bialteral knees mild. Palpation: Tender to palpation at medial joint. Range of Motion: 5 to 90 on the left. 3 to 95 on the right. Strength:  Hamstrings rated: 4/5.  Quadriceps rated: 4/5    Special Tests: Valgus/Varus Stress positive bilaterally       Skin: There are no rashes, ulcerations or lesions. Gait: antalgic  Achilles testing of the right side with noted defect improving in the distal aspect of the achilles now improved 6 weeks status post casting much less pain increasing thickness noted with palpation  continutiy appears to still be present. Rojas sign negative. Dorsiflexion of ankle to 100 degrees with no significant sensitivity or pain. Diagnostic Test Findings:   Medial osteoarthritis of the bialteral knees with medial       Assessment and Plan:  1. Achilles tendon tear, right, sequela        No follow-ups on file. The orders below, if any, were placed during this visit:   No orders of the defined types were placed in this encounter. Treatment Plan:    achilles tear on the right side but unclear issues about when this occurred. Pain in the knees controlled. Irritation of the left knee and give way have not occurred recently. Cast boot was applied in the office with a moderate sized shoe lift and recommendations for gradual weightbearing and increasing dorsiflexion of her foot. Would like to see her back in 2 weeks time for recheck and potentially the start of physical therapy to aggressively pursue strengthening of the Achilles and calf advised patient to begin sliding foot on the floor underneath the chair to begin stretching and work on range of motion of the Achilles tendon advised against strengthening at this point time did recommend the patient continue to utilize the boot and slowly work down the wedge/heel lift as tolerated so every couple of days dropped down a little bit to ensure stability and maintenance and pain control.   Advised patient to be full weightbearing with utilization of the boot or stable lace up shoe    The Achilles is improved gait and analysis and further aggressive strengthening and management of her knees would be next steps  Agree with above history and physical examination. Have seen and examined the patient. Approxiaately 30 minutes minutes for discussion and counseling. PATRICIA Arambula               Diagnosis Orders   1.  Achilles tendon tear, right, sequela         None  Assessment:  1)  Euflexxa  injection of the Bilateral knee  2) Osteoarthritis    Plan:  1) ice, elevation, gentle range of motion as needed for swelling or stiffness of the knee  2) NSAIDs for any pain after injection   3) F/U 6 months PRN      PATRICIA Arambula

## 2021-10-21 ENCOUNTER — OFFICE VISIT (OUTPATIENT)
Dept: ORTHOPEDIC SURGERY | Age: 66
End: 2021-10-21
Payer: MEDICARE

## 2021-10-21 VITALS
SYSTOLIC BLOOD PRESSURE: 157 MMHG | DIASTOLIC BLOOD PRESSURE: 86 MMHG | WEIGHT: 199 LBS | BODY MASS INDEX: 33.97 KG/M2 | HEART RATE: 70 BPM | HEIGHT: 64 IN

## 2021-10-21 DIAGNOSIS — S86.011S ACHILLES TENDON TEAR, RIGHT, SEQUELA: Primary | ICD-10-CM

## 2021-10-21 DIAGNOSIS — M17.0 PRIMARY OSTEOARTHRITIS OF BOTH KNEES: ICD-10-CM

## 2021-10-21 PROCEDURE — 1090F PRES/ABSN URINE INCON ASSESS: CPT | Performed by: PHYSICIAN ASSISTANT

## 2021-10-21 PROCEDURE — 99214 OFFICE O/P EST MOD 30 MIN: CPT | Performed by: PHYSICIAN ASSISTANT

## 2021-10-21 PROCEDURE — G8400 PT W/DXA NO RESULTS DOC: HCPCS | Performed by: PHYSICIAN ASSISTANT

## 2021-10-21 PROCEDURE — 1036F TOBACCO NON-USER: CPT | Performed by: PHYSICIAN ASSISTANT

## 2021-10-21 PROCEDURE — G8417 CALC BMI ABV UP PARAM F/U: HCPCS | Performed by: PHYSICIAN ASSISTANT

## 2021-10-21 PROCEDURE — 1123F ACP DISCUSS/DSCN MKR DOCD: CPT | Performed by: PHYSICIAN ASSISTANT

## 2021-10-21 PROCEDURE — G8427 DOCREV CUR MEDS BY ELIG CLIN: HCPCS | Performed by: PHYSICIAN ASSISTANT

## 2021-10-21 PROCEDURE — 3017F COLORECTAL CA SCREEN DOC REV: CPT | Performed by: PHYSICIAN ASSISTANT

## 2021-10-21 PROCEDURE — G8484 FLU IMMUNIZE NO ADMIN: HCPCS | Performed by: PHYSICIAN ASSISTANT

## 2021-10-21 PROCEDURE — 4040F PNEUMOC VAC/ADMIN/RCVD: CPT | Performed by: PHYSICIAN ASSISTANT

## 2021-10-21 NOTE — PROGRESS NOTES
Subjective    Patient ID: Carolyn Mcrae is a 73 y.o. Lore[place] Red female. Chief Complaint   Patient presents with    Ankle Pain     RIGHT ANKLE-Achilles tendon tear-Feeling better     Currently: 10/21/2021  Patient is here for follow-up regarding partial Achilles tear/tendinitis. She notes significant improvement since previous visit she is no longer utilizing the boot on any of the heel lifts. She is mobilizing the ankle daily and stretching daily notes irritation only is a mild 1-2 out of 10. She is not using any anti-inflammatories but notes that the pain in the bilateral knees is increasing as she is walking more. Still utilizing her walker/cane for assistance with ambulation. She does find herself occasionally walking without anything. Denies any fall trauma or injury denies numbness burning tingling radiating pains down the leg. Previously: 10/7/2021  Patient is here for follow-up regarding partial Achilles tear/tendinitis. He notes overall improvement in regards to pain and function she is still utilizing the boot and weightbearing only with utilization of the boot. She has not been doing a lot of exercises in regards to the ankle as she has not been advised to at present time she is hopeful to begin doing so she is going stir crazy. Denies any fall trauma or injury denies numbness burning tingling radiating pains down the leg. Rates overall pain is in the Achilles tendon is a 1 out of 10. Previously: 9/23/2021  He is done well in the cast her knee pains have been fairly well contained. Cast will be removed today. She has been walking on her toes and has not noticed any increased pain in fact her pain is been decreasing      Previously:      fall with injury of the right ankle. Left knee gave out. Several months of pain in the medial ankle. At one point in the grocery store was unable to get out because of increase in pain in the back of the right ankle.    Most recently one-2 weeks ago had stepped up a stiar and noted marked pain. When discussing tearing sensation she noted this may have occurred at the grocery. Mild swelling at the ankle but no obvious bruising. Knees have been worsening. Previously:    Pt has been previously diagnosed with osteoarthritis of the knee as documented in the prior progress notes. This injection is for the patients Bilateral knee. Patient denies and fevers, chills, recent infections, or new injuries to the knee. stair are aorese for her with left knee with worsening compared to the right side. Heel pain on the right side with potential relation to the left side worsening. On meloxicam with at least some improved pain tolerance. Several weeks ago flare up with in shower difficulty of swelling and pain. Patient's medications, allergies, past medical, surgical, social and family histories were reviewed and updated as appropriate. Physical Exam:    The patient does not have  pain on motion, there is not an effusion, there is tenderness over the  medial, lateral region. No erythema noted. Sensation intact to touch. Pulses present distally. Chief Complaint:   Chief Complaint   Patient presents with    Ankle Pain     RIGHT ANKLE-Achilles tendon tear-Feeling better               Medical History  Current Medications:   Prior to Admission medications    Medication Sig Start Date End Date Taking? Authorizing Provider   Red Yeast Rice Extract (RED YEAST RICE PO) Take by mouth    Historical Provider, MD   atorvastatin (LIPITOR) 10 MG tablet Take 1 tablet by mouth daily 8/25/21   ADITI Dean - CNP   loratadine (CLARITIN) 10 MG capsule Take 10 mg by mouth daily    Historical Provider, MD   diclofenac (VOLTAREN) 50 MG EC tablet Take 1 tablet by mouth 2 times daily (with meals) 4/21/21   Nicholas Villalobos MD   vitamin D (CHOLECALCIFEROL) 400 UNITS TABS tablet Take 400 Units by mouth daily.     Historical follow-up as tenderness across the Achilles tendon reduces with palpation. We are waiting for full healing of that area before moving forward with surgical intervention regarding the knees. Advised for patient to follow-up after 3 months or sooner if pain reduces significantly over the next time frame. Sent in diclofenac 50 mg 1 tablet twice daily to help with reduction of formation and pain. The Achilles is improved gait and analysis and further aggressive strengthening and management of her knees would be next steps  Agree with above history and physical examination. Have seen and examined the patient. Approxiaately 30 minutes minutes for discussion and counseling. PATRICIA Gamboa               Diagnosis Orders   1. Achilles tendon tear, right, sequela     2.  Primary osteoarthritis of both knees         None  Assessment:  1)  Euflexxa  injection of the Bilateral knee  2) Osteoarthritis    Plan:  1) ice, elevation, gentle range of motion as needed for swelling or stiffness of the knee  2) NSAIDs for any pain after injection   3) F/U 6 months PRN      PATRICIA Gamboa

## 2022-02-10 ENCOUNTER — TELEPHONE (OUTPATIENT)
Dept: FAMILY MEDICINE CLINIC | Age: 67
End: 2022-02-10

## 2022-02-10 NOTE — TELEPHONE ENCOUNTER
----- Message from Roxanna Baumgarten sent at 2/10/2022  1:26 PM EST -----  Subject: Message to Provider    QUESTIONS  Information for Provider? Patient wants a callback because she was charged   extra for her wellness visit in August.  ---------------------------------------------------------------------------  --------------  [address]  What is the best way for the office to contact you? OK to leave message on   voicemail  Preferred Call Back Phone Number? [phone]  ---------------------------------------------------------------------------  --------------  SCRIPT ANSWERS  Relationship to Patient?  Self Yes

## 2022-02-10 NOTE — TELEPHONE ENCOUNTER
2/10- PM left msg on pts cell ph - doesn't look like insurance paid anything - nikki to check  Pt had a physical / establish care appt - not sure if thats what the pt wanted - asked to call back

## 2022-02-11 NOTE — TELEPHONE ENCOUNTER
2/11 - pt called back and stated that she felt she had been charged incorrectly as she thought she had an AWV which is free - I explained to her that is not what she had - she had a ntp / physical appt - I explained the difference. However her bill / statement appears to be incorrect as PM doesn't think insurance paid anything - pt also mentioned the same concern for her husbands bill - PM sent email to billing to check to see if both insurances were processed correctly.

## 2022-03-02 NOTE — TELEPHONE ENCOUNTER
3/2 - pt called back - PM spoke to her and explained info from billing - she is going to follow up with [facility] for her and her husbands balances to find out why it was denied - closing encounter

## 2022-03-02 NOTE — TELEPHONE ENCOUNTER
3/2- called pt - LM       Email from billing on 2/14    From: [facility] [email or web address]   Sent: Monday, February 14, 2022 10:37 AM  To: [email or web address]  Subject: RE: (SECURE)  and wife - was their insurance charged? Good Morning [facility] has reviewed the account below for Dena for the [facility] 8/6/2021. I see that the 14256 was billed to the patients insurance for this dos, processed by Medicare, applied the balance of 107.53  her secondary [facility] transferred the balance to the patient also with a denial of non covered under plan. As for the patient stating that she did not make a payment of 63.04, per the history on the line item the details are that this payment was made on 11/4/2021 with a visa card. I am not sure if the patient has a My Chart account and made the payment from there or not, you may want to ask the patient. If your office is disagreeing with the charges billed, you will have to reach out to coding directly for a review of the charges billed. I am also not able to locate the husbands account as there is no account information on the screen shot below for me to locate the account and many patients with the same name in epic. I have noted the account for reference.     Thanks,  Arpit

## 2022-06-22 ENCOUNTER — OFFICE VISIT (OUTPATIENT)
Dept: ORTHOPEDIC SURGERY | Age: 67
End: 2022-06-22
Payer: MEDICARE

## 2022-06-22 VITALS — HEIGHT: 64 IN | WEIGHT: 174 LBS | BODY MASS INDEX: 29.71 KG/M2

## 2022-06-22 DIAGNOSIS — G89.29 CHRONIC PAIN OF LEFT KNEE: ICD-10-CM

## 2022-06-22 DIAGNOSIS — M25.561 CHRONIC PAIN OF RIGHT KNEE: ICD-10-CM

## 2022-06-22 DIAGNOSIS — M25.562 CHRONIC PAIN OF LEFT KNEE: ICD-10-CM

## 2022-06-22 DIAGNOSIS — M17.0 PRIMARY OSTEOARTHRITIS OF BOTH KNEES: Primary | ICD-10-CM

## 2022-06-22 DIAGNOSIS — G89.29 CHRONIC PAIN OF RIGHT KNEE: ICD-10-CM

## 2022-06-22 PROCEDURE — G8417 CALC BMI ABV UP PARAM F/U: HCPCS | Performed by: PHYSICIAN ASSISTANT

## 2022-06-22 PROCEDURE — 3017F COLORECTAL CA SCREEN DOC REV: CPT | Performed by: PHYSICIAN ASSISTANT

## 2022-06-22 PROCEDURE — 99214 OFFICE O/P EST MOD 30 MIN: CPT | Performed by: PHYSICIAN ASSISTANT

## 2022-06-22 PROCEDURE — 1123F ACP DISCUSS/DSCN MKR DOCD: CPT | Performed by: PHYSICIAN ASSISTANT

## 2022-06-22 PROCEDURE — 1090F PRES/ABSN URINE INCON ASSESS: CPT | Performed by: PHYSICIAN ASSISTANT

## 2022-06-22 PROCEDURE — G8427 DOCREV CUR MEDS BY ELIG CLIN: HCPCS | Performed by: PHYSICIAN ASSISTANT

## 2022-06-22 PROCEDURE — G8400 PT W/DXA NO RESULTS DOC: HCPCS | Performed by: PHYSICIAN ASSISTANT

## 2022-06-22 PROCEDURE — 1036F TOBACCO NON-USER: CPT | Performed by: PHYSICIAN ASSISTANT

## 2022-06-22 NOTE — PROGRESS NOTES
Patient Name: Sonia Mackey  : 1955  DOS: 2022        Chief Complaint:   Chief Complaint   Patient presents with    Knee Pain     BILATERAL KNEE       History of Present Illness:  Sonia Mackey is a 78 y.o. female who presents with a chief complaint of continued complaints of pain in the knee with irritation with walking, stairs and with overuse. Pain in the knee regularly with swelling and discomfort. Increase in pain over the past several years time. Patient is here for follow-up on bilateral knee pain and osteoarthritis. She notes that the injections that she has had in the past worked well to control her pain she notes all of her pain to be on the medial aspect of bilateral knees with the left knee being slightly worse than the right but fairly equal overall. She notes that the left knee mohini and gives out on her more. She notes that she is ready to move forward with surgical intervention of potential partial knee arthroplasty as discussed previously prior to her Achilles tendon injury. She notes that she does get relief with utilization of the injections and with medications but still is not able to pursue activities that she would like to do and is limited in her ability to pursue activities of daily living due to the pain around the knees. Denies fall trauma or injury. Denies numbness burning tingling radiating pains on the leg. Rates pain level as 5-6 out of 10. Medical History  Current Medications:   Prior to Admission medications    Medication Sig Start Date End Date Taking?  Authorizing Provider   vitamin D (CHOLECALCIFEROL) 400 UNITS TABS tablet Take 1,000 Units by mouth daily    Yes Historical Provider, MD   diclofenac (VOLTAREN) 50 MG EC tablet TAKE 1 TABLET BY MOUTH TWICE A DAY WITH MEALS 22   PATRICIA Wilder   Red Yeast Rice Extract (RED YEAST RICE PO) Take by mouth    Historical Provider, MD   diclofenac (VOLTAREN) 50 MG EC tablet Take 1 tablet by mouth 2 times daily (with meals) 21   Richard Lenz MD   Multiple Vitamin (MULTI-VITAMIN PO) Take  by mouth. Historical Provider, MD     Allergies: Allergies   Allergen Reactions    Penicillins        Review of Systems:     Review of Systems ______  Constitutional: Negative for fever and diaphoresis. ____________  Respiratory: Negative for shortness of breath.  ________  Gastrointestinal: Negative for abdominal pain. ________  Musculoskeletal: Positive for joint pain. ____  Skin: Negative for itching. ____  Neurological: Negative for loss of consciousness. ______________  All other systems reviewed and negative     Past Medical History:   Diagnosis Date    Hyperlipidemia     Menopause     Menopause      (spontaneous vaginal delivery)          Family History   Problem Relation Age of Onset    Cancer Mother         colon cancer/cervical cancer    Cataracts Mother     High Cholesterol Father     Cataracts Father     Cancer Brother         anal cancer    Breast Cancer Niece 52     Social History     Socioeconomic History    Marital status:      Spouse name: Not on file    Number of children: Not on file    Years of education: Not on file    Highest education level: Not on file   Occupational History    Not on file   Tobacco Use    Smoking status: Never Smoker    Smokeless tobacco: Never Used   Substance and Sexual Activity    Alcohol use: No    Drug use: No    Sexual activity: Yes     Partners: Male     Comment: miranda = Lorenzo   Other Topics Concern    Not on file   Social History Narrative    Not on file     Social Determinants of Health     Financial Resource Strain:     Difficulty of Paying Living Expenses: Not on file   Food Insecurity:     Worried About Running Out of Food in the Last Year: Not on file    Jay of Food in the Last Year: Not on file   Transportation Needs:     Lack of Transportation (Medical):  Not on file    Lack of Transportation (Non-Medical): Not on file   Physical Activity:     Days of Exercise per Week: Not on file    Minutes of Exercise per Session: Not on file   Stress:     Feeling of Stress : Not on file   Social Connections:     Frequency of Communication with Friends and Family: Not on file    Frequency of Social Gatherings with Friends and Family: Not on file    Attends Druze Services: Not on file    Active Member of [address] [place] [address] or Organizations: Not on file    Attends Club or Organization Meetings: Not on file    Marital Status: Not on file   Intimate Partner Violence:     Fear of Current or Ex-Partner: Not on file    Emotionally Abused: Not on file    Physically Abused: Not on file    Sexually Abused: Not on file   Housing Stability:     Unable to Pay for Housing in the Last Year: Not on file    Number of [place] in the Last Year: Not on file    Unstable Housing in the Last Year: Not on file         Physical Exam __  Constitutional: She is oriented to person, place, and time and well-developed, well-nourished, and in no distress. No distress. ____  HENT:   Head: Normocephalic and atraumatic. ____  Eyes: Conjunctivae are normal. ________  Cardiovascular: Intact distal pulses. ____  Pulmonary/Chest: Effort normal. ________________________  Neurological: She is alert and oriented to person, place, and time. ____  Skin: Skin is dry. She is not diaphoretic. ____  Psychiatric: Mood, affect and judgment normal. ______          Assessment   Vital Signs: There were no vitals filed for this visit. bilateral Knee shows evidence for DJD with varus obvious pseudolaxity, pain with weight bearing, antalgic gait and palpable osteophytes. Inspection: Moderate anterior swelling. Swelling is present with mild effusion. The posterior aspect of the knee appears to be full with tenderness. There is no erythema, rash, or ecchymosis.      Range of Motion:  Right 0-120 left 0-120     Pain with varus and valgus testing    There is varus deformity noted bilaterally    Strength:  Hamstrings rated: 4/5. Quadriceps rated: 5/5    Palpation: There is moderate tenderness along the medial joint line. Special Tests: Patellar Compression test is positive. Valgus & Varus test is positive. Skin: There are no rashes, ulcerations or lesions. Gait: Gait pattern is antalgic  Skin shows no rashes/ecchymosis to the affected area, no hyperesthesias, no discoloration, no temperature or color discrepancies. NEUROLOGICALLY: There is no evidence for sensory or motor deficits in the extremity. Coordination appears full with no spacticity or rigidity. Reflexes appear to be symmetric. Distal circulation intact. No signs of RSD. Additional Comments: Hip range of motion full and intact bilaterally        Procedure(s): No new procedures performed at today's date    Diagnostic Test Findings:   Xray   Have reviewed the xrays above from 06/22/22   4 view x-ray left knee AP, lateral, sunrise and tunnel views were performed and reviewed today and my impression is:Severe medial joint space loss and osteophyte formation noted on the medial aspect of the knee with varus angulation noted mild lateral joint space loss and osteophyte formation and mild patellofemoral joint space loss and osteophyte formation. 4 view x-ray of the right knee AP, lateral, sunrise and tunnel views were performed and reviewed today and my impression is: Severe medial joint space loss and osteophyte formation noted on the medial aspect of the knee with varus angulation noted mild lateral joint space loss and osteophyte formation and mild patellofemoral joint space loss and osteophyte formation. Assessment and Plan:       Diagnosis Orders   1. Primary osteoarthritis of both 72 Chilo Chang MD, Orthopedic Surgery, [facility]   2. Chronic pain of left knee  XR KNEE LEFT (MIN 4 VIEWS)   3.  Chronic pain of right knee  XR KNEE RIGHT (MIN 4 VIEWS) The orders below, if any, were placed during this visit:   Orders Placed This Encounter   Procedures    XR KNEE LEFT (MIN 4 VIEWS)     Standing Status:   Future     Number of Occurrences:   1     Standing Expiration Date:   7/22/2022     Order Specific Question:   Reason for exam:     Answer:   Pain    XR KNEE RIGHT (MIN 4 VIEWS)     Standing Status:   Future     Number of Occurrences:   1     Standing Expiration Date:   7/22/2022     Order Specific Question:   Reason for exam:     Answer:   Pain   Chino Strickland MD, Orthopedic Surgery, [facility]     Referral Priority:   Routine     Referral Type:   Eval and Treat     Referral Reason:   Specialty Services Required     Requested Specialty:   Orthopedic Surgery     Number of Visits Requested:   1              Treatment Plan:   -Based upon examination and patient's description of her symptoms and pain location pursuance of partial knee arthroplasty still makes sense in regards to pursuance.  -Discussed with patient that Dr. Brianna Castorena is leaving the practice and want to be unable to pursue the surgeries as discussed previously with her. We will work with her in establishing a referral to another provider Dr. Jennifer Edwards who also does the desired partial knee procedure.  -Discussed with patient pre and postoperative management and requirements and advised for patient to follow-up with us on an as-needed basis  -Spent 35 minutes in discussion with patient regarding future current treatment management options.          PATRICIA Babcock

## 2022-06-29 SDOH — HEALTH STABILITY: PHYSICAL HEALTH: ON AVERAGE, HOW MANY DAYS PER WEEK DO YOU ENGAGE IN MODERATE TO STRENUOUS EXERCISE (LIKE A BRISK WALK)?: 4 DAYS

## 2022-06-29 SDOH — HEALTH STABILITY: PHYSICAL HEALTH: ON AVERAGE, HOW MANY MINUTES DO YOU ENGAGE IN EXERCISE AT THIS LEVEL?: 30 MIN

## 2022-06-29 ASSESSMENT — SOCIAL DETERMINANTS OF HEALTH (SDOH)
WITHIN THE LAST YEAR, HAVE TO BEEN RAPED OR FORCED TO HAVE ANY KIND OF SEXUAL ACTIVITY BY YOUR PARTNER OR EX-PARTNER?: NO
WITHIN THE LAST YEAR, HAVE YOU BEEN KICKED, HIT, SLAPPED, OR OTHERWISE PHYSICALLY HURT BY YOUR PARTNER OR EX-PARTNER?: NO
WITHIN THE LAST YEAR, HAVE YOU BEEN AFRAID OF YOUR PARTNER OR EX-PARTNER?: NO
WITHIN THE LAST YEAR, HAVE YOU BEEN HUMILIATED OR EMOTIONALLY ABUSED IN OTHER WAYS BY YOUR PARTNER OR EX-PARTNER?: NO

## 2022-07-01 ENCOUNTER — OFFICE VISIT (OUTPATIENT)
Dept: ORTHOPEDIC SURGERY | Age: 67
End: 2022-07-01
Payer: MEDICARE

## 2022-07-01 VITALS — WEIGHT: 174 LBS | BODY MASS INDEX: 29.71 KG/M2 | HEIGHT: 64 IN

## 2022-07-01 DIAGNOSIS — G89.29 CHRONIC PAIN OF RIGHT KNEE: ICD-10-CM

## 2022-07-01 DIAGNOSIS — G89.29 CHRONIC PAIN OF LEFT KNEE: Primary | ICD-10-CM

## 2022-07-01 DIAGNOSIS — M25.562 CHRONIC PAIN OF LEFT KNEE: Primary | ICD-10-CM

## 2022-07-01 DIAGNOSIS — M25.561 CHRONIC PAIN OF RIGHT KNEE: ICD-10-CM

## 2022-07-01 DIAGNOSIS — M17.0 PRIMARY OSTEOARTHRITIS OF BOTH KNEES: ICD-10-CM

## 2022-07-01 PROCEDURE — G8427 DOCREV CUR MEDS BY ELIG CLIN: HCPCS | Performed by: ORTHOPAEDIC SURGERY

## 2022-07-01 PROCEDURE — 99214 OFFICE O/P EST MOD 30 MIN: CPT | Performed by: ORTHOPAEDIC SURGERY

## 2022-07-01 PROCEDURE — 1036F TOBACCO NON-USER: CPT | Performed by: ORTHOPAEDIC SURGERY

## 2022-07-01 PROCEDURE — 1123F ACP DISCUSS/DSCN MKR DOCD: CPT | Performed by: ORTHOPAEDIC SURGERY

## 2022-07-01 PROCEDURE — 3017F COLORECTAL CA SCREEN DOC REV: CPT | Performed by: ORTHOPAEDIC SURGERY

## 2022-07-01 PROCEDURE — G8417 CALC BMI ABV UP PARAM F/U: HCPCS | Performed by: ORTHOPAEDIC SURGERY

## 2022-07-01 PROCEDURE — G8400 PT W/DXA NO RESULTS DOC: HCPCS | Performed by: ORTHOPAEDIC SURGERY

## 2022-07-01 PROCEDURE — 1090F PRES/ABSN URINE INCON ASSESS: CPT | Performed by: ORTHOPAEDIC SURGERY

## 2022-07-01 NOTE — PROGRESS NOTES
Dr Nilam Osborn      Date /Time 2022       2:18 PM EDT  Name Lazarus Curly             1955   Location  [address] [facility]  MRN [id number]                Chief Complaint   Patient presents with    Knee Pain     DENNISE KNEES         History of Present Illness  Lazarus Curly is a 78 y.o. female who presents with  bilateral knee pain,. Sent in consultation by Gian Fortune, ADITI Kwon CNP,. Athletic/exercise activity: no sports. Injury Mechanism:  none. Worker's Comp. & legal issues:   none. Previous Treatments: Ice, Heat and NSAIDs    Patient presents to the office today for a new problem. Patient previously seen by . He has had cortisone injections in the past.  She was scheduled for a partial knee arthroplasty but unfortunately he is leaving the practice. She is here for further discussion concerning partial knee replacement. Her pain is mostly concentrated medial.  She does have a more mild patellofemoral and lateral compartment pain. Symptoms are aggravated by activities and improved by rest.  No recent injury or trauma.     Past History  Past Medical History:   Diagnosis Date    Hyperlipidemia     Menopause     Menopause      (spontaneous vaginal delivery)          Past Surgical History:   Procedure Laterality Date    COLONOSCOPY  02/15/2001, 2006, 10/1/12     Social History     Tobacco Use    Smoking status: Never Smoker    Smokeless tobacco: Never Used   Substance Use Topics    Alcohol use: No      Current Outpatient Medications on File Prior to Visit   Medication Sig Dispense Refill    diclofenac (VOLTAREN) 50 MG EC tablet TAKE 1 TABLET BY MOUTH TWICE A DAY WITH MEALS 60 tablet 3    Red Yeast Rice Extract (RED YEAST RICE PO) Take by mouth      diclofenac (VOLTAREN) 50 MG EC tablet Take 1 tablet by mouth 2 times daily (with meals) 60 tablet 3    vitamin D (CHOLECALCIFEROL) 400 UNITS TABS tablet Take 1,000 Units by mouth daily       Multiple Vitamin (MULTI-VITAMIN PO) Take  by mouth. No current facility-administered medications on file prior to visit. ASCVD 10-YEAR RISK SCORE  The 10-year ASCVD risk score (Mayur Cartagena, et al., 2013) is: 9.6%    Values used to calculate the score:      Age: 78 years      Sex: Female      Is Non- : No      Diabetic: No      Tobacco smoker: No      Systolic Blood Pressure: 079 mmHg      Is BP treated: No      HDL Cholesterol: 70 mg/dL      Total Cholesterol: 272 mg/dL     Review of Systems  10-point ROS is negative other than HPI. Physical Exam  Based off 1997 Exam Criteria  Ht 5' 4\" (1.626 m)   Wt 174 lb (78.9 kg)   LMP  (LMP Unknown)   BMI 29.87 kg/m²      Constitutional:       General: He is not in acute distress. Appearance: Normal appearance. Cardiovascular:      Rate and Rhythm: Normal rate and regular rhythm. Pulses: Normal pulses. Pulmonary:      Effort: Pulmonary effort is normal. No respiratory distress. Neurological:      Mental Status: He is alert and oriented to person, place, and time. Mental status is at baseline. Skin: Mean, dry, intact. No open sores  Lymphatics: No palpable lymph nodes    Musculoskeletal:  Gait:  antalgic  Lumbar spine: There is no swelling, warmth, or erythema. Range of motion is within normal limits. There is no paraspinal or spinous process tenderness. . The distal neurovascular exam is grossly intact and symmetric. [place] Hip: Examination of the right and left hip reveals intact skin. The patient demonstrates full painless range of motion with regards to flexion, abduction, internal and external rotation. There is no tenderness about the greater trochanter. R Knee: Physical exam of the knee demonstrates painful range of motion 5-110. Tenderness over the medial joint line.   No gross instability to either varus or valgus stress test.  Positive patellofemoral grind  L Knee: Physical exam of the knee demonstrates painful range of motion 5-110. Tenderness over the medial joint line. No gross instability to either varus or valgus stress test.  Positive patellofemoral grind test      Imaging  Bilateral Knee: [address]  Radiographs: X-rays were ordered today reviewed of both the right and left knee. 4 views. Standing AP, standing AP flexed, lateral, and skyline views. They demonstrate end-stage osteoarthritis medially with complete collapse of the joint surface. Lateral joint spaces well-maintained but significant varus deformity present. In addition significant osteophytes present patellofemoral joint      Procedure:  No orders of the defined types were placed in this encounter. Assessment and Plan  Dania Castillo was seen today for knee pain. Diagnoses and all orders for this visit:    Chronic pain of left knee    Chronic pain of right knee    Primary osteoarthritis of both knees        With patient's x-rays and clinical exam and varus deformity and x-rays I believe she is a better candidate for a total knee arthroplasty. She will consider her options and will call to schedule surgery. We have also offered the patient repeat cortisone injections or viscosupplementation injections if she chooses to not proceed with surgery. Again she will either call or follow-up in the office to update us on her decision. I discussed with Janay Bocanegra that her history, symptoms, signs and imaging are most consistent with knee arthritis. We reviewed the natural history of these conditions and treatment options ranging from conservative measures (rest, icing, activity modification, physical therapy, pain meds, cortisone injection) to surgical options. In terms of treatment, I recommended continuing with rest, icing, avoidance of painful activities, NSAIDs or pain meds as tolerated, and physical therapy. If these are not effective, cortisone injection can be considered.   We discussed surgical options as well, should conservative measures fail. Electronically signed by Gracy Savage MD on 7/1/2022 at 2:18 PM  This dictation was generated by voice recognition computer software. Although all attempts are made to edit the dictation for accuracy, there may be errors in the transcription that are not intended.

## 2022-07-14 ENCOUNTER — OFFICE VISIT (OUTPATIENT)
Dept: ORTHOPEDIC SURGERY | Age: 67
End: 2022-07-14
Payer: MEDICARE

## 2022-07-14 VITALS — WEIGHT: 174 LBS | BODY MASS INDEX: 29.71 KG/M2 | HEIGHT: 64 IN

## 2022-07-14 DIAGNOSIS — M17.12 PRIMARY OSTEOARTHRITIS OF LEFT KNEE: ICD-10-CM

## 2022-07-14 DIAGNOSIS — Z01.818 PREOP TESTING: Primary | ICD-10-CM

## 2022-07-14 PROCEDURE — G8417 CALC BMI ABV UP PARAM F/U: HCPCS | Performed by: ORTHOPAEDIC SURGERY

## 2022-07-14 PROCEDURE — G8427 DOCREV CUR MEDS BY ELIG CLIN: HCPCS | Performed by: ORTHOPAEDIC SURGERY

## 2022-07-14 PROCEDURE — 3017F COLORECTAL CA SCREEN DOC REV: CPT | Performed by: ORTHOPAEDIC SURGERY

## 2022-07-14 PROCEDURE — G8400 PT W/DXA NO RESULTS DOC: HCPCS | Performed by: ORTHOPAEDIC SURGERY

## 2022-07-14 PROCEDURE — 99214 OFFICE O/P EST MOD 30 MIN: CPT | Performed by: ORTHOPAEDIC SURGERY

## 2022-07-14 PROCEDURE — 1123F ACP DISCUSS/DSCN MKR DOCD: CPT | Performed by: ORTHOPAEDIC SURGERY

## 2022-07-14 PROCEDURE — 1036F TOBACCO NON-USER: CPT | Performed by: ORTHOPAEDIC SURGERY

## 2022-07-14 PROCEDURE — 1090F PRES/ABSN URINE INCON ASSESS: CPT | Performed by: ORTHOPAEDIC SURGERY

## 2022-07-14 NOTE — PROGRESS NOTES
(VOLTAREN) 50 MG EC tablet TAKE 1 TABLET BY MOUTH TWICE A DAY WITH MEALS 60 tablet 3    Red Yeast Rice Extract (RED YEAST RICE PO) Take by mouth      diclofenac (VOLTAREN) 50 MG EC tablet Take 1 tablet by mouth 2 times daily (with meals) 60 tablet 3    vitamin D (CHOLECALCIFEROL) 400 UNITS TABS tablet Take 1,000 Units by mouth daily       Multiple Vitamin (MULTI-VITAMIN PO) Take  by mouth. No current facility-administered medications on file prior to visit. ASCVD 10-YEAR RISK SCORE  The 10-year ASCVD risk score (Ama Medina, et al., 2013) is: 9.6%    Values used to calculate the score:      Age: 78 years      Sex: Female      Is Non- : No      Diabetic: No      Tobacco smoker: No      Systolic Blood Pressure: 239 mmHg      Is BP treated: No      HDL Cholesterol: 70 mg/dL      Total Cholesterol: 272 mg/dL     Review of Systems  10-point ROS is negative other than HPI. Physical Exam  Based off 1997 Exam Criteria  Ht 5' 4\" (1.626 m)   Wt 174 lb (78.9 kg)   LMP  (LMP Unknown)   BMI 29.87 kg/m²      Constitutional:       General: He is not in acute distress. Appearance: Normal appearance. Cardiovascular:      Rate and Rhythm: Normal rate and regular rhythm. Pulses: Normal pulses. Pulmonary:      Effort: Pulmonary effort is normal. No respiratory distress. Neurological:      Mental Status: He is alert and oriented to person, place, and time. Mental status is at baseline. Skin: Mean, dry, intact. No open sores  Lymphatics: No palpable lymph nodes    Musculoskeletal:  Gait:  antalgic    R Knee: Physical exam of the knee demonstrates painful range of motion . Tenderness over the medial joint line. No gross instability to either varus or valgus stress test.  Positive patellofemoral grind  L Knee: Physical exam of the knee demonstrates painful range of motion . Tenderness over the medial joint line.   No gross instability to either varus or valgus stress test.  Positive patellofemoral grind test      Imaging  Bilateral Knee: [address] [facility]  Previous Radiographs: X-rays were ordered today reviewed of both the right and left knee. 4 views. Standing AP, standing AP flexed, lateral, and skyline views. They demonstrate end-stage osteoarthritis medially with complete collapse of the joint surface. Lateral joint spaces well-maintained but significant varus deformity present. In addition significant osteophytes present patellofemoral joint      Procedure:  Orders Placed This Encounter   Procedures    Culture, Urine     Standing Status:   Future     Standing Expiration Date:   7/14/2023     Order Specific Question:   Specify (ex-cath, midstream, cysto, etc)? Answer:   midstream    Culture, MRSA, Screening     Standing Status:   Future     Standing Expiration Date:   7/14/2023    CT KNEE LEFT WO CONTRAST     Standing Status:   Future     Standing Expiration Date:   7/14/2023     Order Specific Question:   Reason for exam:     Answer:   Navi Covert    Dr Kenneth Wade Urinalysis     Standing Status:   Future     Standing Expiration Date:   7/14/2023    CBC with Auto Differential     Standing Status:   Future     Standing Expiration Date:   7/14/2023    Basic Metabolic Panel     Standing Status:   Future     Standing Expiration Date:   7/14/2023    Hemoglobin A1C     Standing Status:   Future     Standing Expiration Date:   7/14/2023    Protime-INR     Standing Status:   Future     Standing Expiration Date:   7/14/2023     Order Specific Question:   Daily Coumadin Dose? Answer:   unknown    APTT     Standing Status:   Future     Standing Expiration Date:   7/14/2023     Order Specific Question:   Daily Heparin Dose?      Answer:   unknown    Albumin     Standing Status:   Future     Standing Expiration Date:   7/14/2023    Transferrin     Standing Status:   Future     Standing Expiration Date:   7/14/2023    Ambulatory referral to Physical Therapy     Referral Priority:   Routine     Referral Type:   Eval and Treat     Referral Reason:   Specialty Services Required     Requested Specialty:   Physical Therapist     Number of Visits Requested:   1    EKG 12 Lead     Standing Status:   Future     Standing Expiration Date:   7/14/2023     Order Specific Question:   Reason for Exam?     Answer:   Pre-op    Type and Screen     Standing Status:   Future     Standing Expiration Date:   7/14/2023       Assessment and Plan  Babita Lopez was seen today for knee pain. Diagnoses and all orders for this visit:    Preop testing  -     CT KNEE LEFT WO CONTRAST; Future  -     Urinalysis; Future  -     Culture, Urine; Future  -     CBC with Auto Differential; Future  -     Basic Metabolic Panel; Future  -     Hemoglobin A1C; Future  -     Protime-INR; Future  -     APTT; Future  -     Albumin; Future  -     Transferrin; Future  -     Culture, MRSA, Screening; Future  -     EKG 12 Lead; Future  -     Type and Screen; Future  -     Ambulatory referral to Physical Therapy    Primary osteoarthritis of left knee  -     CT KNEE LEFT WO CONTRAST; Future  -     Urinalysis; Future  -     Culture, Urine; Future  -     CBC with Auto Differential; Future  -     Basic Metabolic Panel; Future  -     Hemoglobin A1C; Future  -     Protime-INR; Future  -     APTT; Future  -     Albumin; Future  -     Transferrin; Future  -     Culture, MRSA, Screening; Future  -     EKG 12 Lead; Future  -     Type and Screen; Future  -     Ambulatory referral to Physical Therapy    Other orders  -     mupirocin (BACTROBAN) 2 % ointment; Apply twice daily to each nare for 5 days prior to surgical procedure        With patient's x-rays and clinical exam and varus deformity and x-rays I believe she is a better candidate for a total knee arthroplasty. She will now proceed with a left total knee arthroplasty. I will order preoperative lab work at this time.     I discussed with Salvatoer Mahmood that her history, symptoms, signs and imaging are most consistent with knee arthritis    We reviewed the natural history of these conditions and treatment options ranging from conservative measures (rest, icing, activity modification, physical therapy, pain meds, cortisone injection)  to surgical options. We had a long discussion with the patient about their knee. We discussed surgical and non surgical options for knee arthritis. The most important thing is to work to maintain their range of motion. Next they can try medications including tylenol and NSAIDs. They can try glucosamine or chondroitin. They should also ice frequently and avoid activities that make their knee hurt. Cortisone injections and Synvisc injections are also options when medicine has failed. We finally discussed surgical options including arthroscopic debridement versus knee replacement. Often the arthritis is too far gone for an arthroscopic debridement and pain relief will be short term. Their ultimate solution will be a knee replacement when they are ready for it. They should put it off until they can no longer stand the pain and when nothing else has worked. Conservative measures have failed. She is not interested in cortisone injections. I think she is an appropriate candidate for surgery due to her ongoing symptoms and dysfunction despite conservative measures. The procedure would be Left knee  27284 Total Knee Arthroplasty    Perioperative considerations include: Pre-operative clearance from medical subspecialty. We reviewed the risks, benefits, alternatives of this approach. We discussed risks including, but not limited to, bleeding, pain, infection, scarring, damage to the neurovascular structures, blood clots, pulmonary embolus, stiffness, implant instability or loosening, implant failure, incomplete relief of pain, and incomplete return of function.     We also reviewed the surgical details, expected recovery, and rehabilitation (6-9 months). She expressed understanding and will undergo preoperative medical evaluation and optimization. Electronically signed by Abel James MD on 7/14/2022 at 11:52 AM  This dictation was generated by voice recognition computer software. Although all attempts are made to edit the dictation for accuracy, there may be errors in the transcription that are not intended.

## 2022-07-14 NOTE — LETTER
[facility]  Surgery Scheduling Form:    22     DEMOGRAPHICS    Patient Name:  Yazan Moyer  Patient :  1955   Patient SS#:  [id number]    Patient Phone:  [phone] (home)  Alt. Patient Phone:    Patient Address:  [place] [address]    PCP:  ADITI Phillips CNP  Insurance:  Payor: MEDICARE / Plan: MEDICARE PART A AND B / Product Type: *No Product type* /   Insurance ID Number:  Payor/Plan Subscr  Sex Relation Sub. Ins. ID Effective Group Num   1. MEDICARE - MERuss Day 1955 Female Self [id number] 21                                    [address] [address]   2.  [address] [facility] 1955 Female Self [id number]                                    [address]       DIAGNOSIS & PROCEDURE    Diagnosis: Left Knee  M17.12 Primary Osteoarthritis    Operation: LEFT  22450 Total Knee Arthroplasty    Provider:  Tristan Burciaga MD    Location:  [facility] INFORMATION    Requested Date: 2022    Requested Time:  7:30 am       Patient Arrival Time:  5:30 am   OR Time Required:  110 Minutes  Admission:  []Outpatient   []23 hour  [x]Same Day Admit:   1-2  days  []Inpatient    Anesthesia:  []General  [x]Spinal  [x]MAC/Sedation  Regional Anesthesia:  []None  []Lumbar Plexus Block  []Fascia Iliaca  []Femoral  [x]Adductor canal  []Interscalene Block  []Insert Catheter  []OrthoMix []Exparel       EQUIPMENT    Position:  [x]Supine  []Lateral  []Beach-chair  []Prone    OR Bed:  [x]Regular  [x]DeMayo knee positioner  []Ducor    Radiology:  []Large C-arm  []Small C-arm  []Portable X-ray    Implants:  Medacta Knee:  [x]Primary Set  []Revision Set  [employer] Knee:  []Revision Set    Pre-op Orders      X   Ortho mix     Ropivacaine 0.2% 30 mL    Bupivacaine-epinephrine 0.25% 30 mL    Dexamethasone 4 mg    Toradol 30 mg    Clonidine 100 mcg    Base-sodium chloride 0.9% to 30 mL  [x]On Q ball   600 mL ropivacaine 0. 2%      SUTURE: []#5 Ethibond  [x]#2 Ethibond  [x]#2 Quill  []#1 PDS  [x]#1 Vicryl                   [x]2-0 Vicryl  [x]3-0 Monocryl  []2-0 Nylon  []3-0 Nylon  []3-0 PDS                    []Dermabond  []Steri-strips (in half)  DRESSING:  [x]Prineo dermabond  []4x4 gauze  [x]ABDs  [x]Tegaderm                         []Staples  []Pravena incisional vac  BRACE: []Pelvic Binder  []Hip X-ACT  []Knee TROM  [x]Knee immobilizer                 []Shoulder Immob. (w/abd. pillow)  []Sling  []Ice Unit  [x]Ace-Wrap      [][employer]:  Wilfrido Howard [phone], [email or web address]  [x]Medacta: Jyl Brain [phone], [email or web address]  []Fx Shoulder: Patti Citizen [phone], [email or web address]  []Teri: Sara Garcia [phone], [email or web address]    Comments:        Renuka Loyd MD  [address]  [facility]  7/14/2022       11:56 AM EDT

## 2022-07-18 ENCOUNTER — HOSPITAL ENCOUNTER (OUTPATIENT)
Dept: CT IMAGING | Age: 67
Discharge: HOME | End: 2022-07-18
Payer: MEDICARE

## 2022-07-18 ENCOUNTER — HOSPITAL ENCOUNTER (OUTPATIENT)
Age: 67
Discharge: HOME | End: 2022-07-18
Payer: MEDICARE

## 2022-07-18 DIAGNOSIS — M17.12 PRIMARY OSTEOARTHRITIS OF LEFT KNEE: ICD-10-CM

## 2022-07-18 DIAGNOSIS — Z01.818 PREOP TESTING: ICD-10-CM

## 2022-07-18 LAB
ABO/RH: NORMAL
ALBUMIN SERPL-MCNC: 4.8 G/DL (ref 3.4–5)
ANION GAP SERPL CALCULATED.3IONS-SCNC: 15 MMOL/L (ref 3–16)
ANTIBODY SCREEN: NORMAL
APTT: 34.8 SEC (ref 23–34.3)
BACTERIA: ABNORMAL /HPF
BASOPHILS ABSOLUTE: 0.1 K/UL (ref 0–0.2)
BASOPHILS RELATIVE PERCENT: 1.2 %
BILIRUBIN URINE: NEGATIVE
BLOOD, URINE: NEGATIVE
BUN BLDV-MCNC: 14 MG/DL (ref 7–20)
CALCIUM SERPL-MCNC: 9.9 MG/DL (ref 8.3–10.6)
CHLORIDE BLD-SCNC: 100 MMOL/L (ref 99–110)
CLARITY: CLEAR
CO2: 25 MMOL/L (ref 21–32)
COLOR: YELLOW
CREAT SERPL-MCNC: 0.6 MG/DL (ref 0.6–1.2)
EOSINOPHILS ABSOLUTE: 0.1 K/UL (ref 0–0.6)
EOSINOPHILS RELATIVE PERCENT: 1.9 %
EPITHELIAL CELLS, UA: ABNORMAL /HPF (ref 0–5)
GFR AFRICAN AMERICAN: >60
GFR NON-AFRICAN AMERICAN: >60
GLUCOSE BLD-MCNC: 83 MG/DL (ref 70–99)
GLUCOSE URINE: NEGATIVE MG/DL
HCT VFR BLD CALC: 37.8 % (ref 36–48)
HEMOGLOBIN: 12.6 G/DL (ref 12–16)
INR BLD: 1 (ref 0.87–1.14)
KETONES, URINE: NEGATIVE MG/DL
LEUKOCYTE ESTERASE, URINE: ABNORMAL
LYMPHOCYTES ABSOLUTE: 2.5 K/UL (ref 1–5.1)
LYMPHOCYTES RELATIVE PERCENT: 42.2 %
MCH RBC QN AUTO: 29.8 PG (ref 26–34)
MCHC RBC AUTO-ENTMCNC: 33.4 G/DL (ref 31–36)
MCV RBC AUTO: 89.3 FL (ref 80–100)
MICROSCOPIC EXAMINATION: YES
MONOCYTES ABSOLUTE: 0.4 K/UL (ref 0–1.3)
MONOCYTES RELATIVE PERCENT: 6.2 %
NEUTROPHILS ABSOLUTE: 2.9 K/UL (ref 1.7–7.7)
NEUTROPHILS RELATIVE PERCENT: 48.5 %
NITRITE, URINE: NEGATIVE
PDW BLD-RTO: 14.4 % (ref 12.4–15.4)
PH UA: 6.5 (ref 5–8)
PLATELET # BLD: 289 K/UL (ref 135–450)
PMV BLD AUTO: 7.7 FL (ref 5–10.5)
POTASSIUM SERPL-SCNC: 4.4 MMOL/L (ref 3.5–5.1)
PROTEIN UA: NEGATIVE MG/DL
PROTHROMBIN TIME: 13 SEC (ref 11.7–14.5)
RBC # BLD: 4.24 M/UL (ref 4–5.2)
RBC UA: ABNORMAL /HPF (ref 0–4)
SODIUM BLD-SCNC: 140 MMOL/L (ref 136–145)
SPECIFIC GRAVITY UA: 1.01 (ref 1–1.03)
TRANSFERRIN: 298 MG/DL (ref 200–360)
URINE TYPE: ABNORMAL
UROBILINOGEN, URINE: 0.2 E.U./DL
WBC # BLD: 5.9 K/UL (ref 4–11)
WBC UA: ABNORMAL /HPF (ref 0–5)

## 2022-07-18 PROCEDURE — 87641 MR-STAPH DNA AMP PROBE: CPT

## 2022-07-18 PROCEDURE — 83036 HEMOGLOBIN GLYCOSYLATED A1C: CPT

## 2022-07-18 PROCEDURE — 87086 URINE CULTURE/COLONY COUNT: CPT

## 2022-07-18 PROCEDURE — 73700 CT LOWER EXTREMITY W/O DYE: CPT

## 2022-07-18 PROCEDURE — 86901 BLOOD TYPING SEROLOGIC RH(D): CPT

## 2022-07-18 PROCEDURE — 82040 ASSAY OF SERUM ALBUMIN: CPT

## 2022-07-18 PROCEDURE — 81001 URINALYSIS AUTO W/SCOPE: CPT

## 2022-07-18 PROCEDURE — 85610 PROTHROMBIN TIME: CPT

## 2022-07-18 PROCEDURE — 86850 RBC ANTIBODY SCREEN: CPT

## 2022-07-18 PROCEDURE — 36415 COLL VENOUS BLD VENIPUNCTURE: CPT

## 2022-07-18 PROCEDURE — 85025 COMPLETE CBC W/AUTO DIFF WBC: CPT

## 2022-07-18 PROCEDURE — 87186 SC STD MICRODIL/AGAR DIL: CPT

## 2022-07-18 PROCEDURE — 84466 ASSAY OF TRANSFERRIN: CPT

## 2022-07-18 PROCEDURE — 87077 CULTURE AEROBIC IDENTIFY: CPT

## 2022-07-18 PROCEDURE — 86900 BLOOD TYPING SEROLOGIC ABO: CPT

## 2022-07-18 PROCEDURE — 80048 BASIC METABOLIC PNL TOTAL CA: CPT

## 2022-07-18 PROCEDURE — 85730 THROMBOPLASTIN TIME PARTIAL: CPT

## 2022-07-19 LAB
ESTIMATED AVERAGE GLUCOSE: 116.9 MG/DL
HBA1C MFR BLD: 5.7 %
MRSA SCREEN RT-PCR: NORMAL

## 2022-07-20 LAB
ORGANISM: ABNORMAL
ORGANISM: ABNORMAL
URINE CULTURE, ROUTINE: ABNORMAL
URINE CULTURE, ROUTINE: ABNORMAL

## 2022-07-21 DIAGNOSIS — N30.00 ACUTE CYSTITIS WITHOUT HEMATURIA: Primary | ICD-10-CM

## 2022-07-21 RX ORDER — SULFAMETHOXAZOLE AND TRIMETHOPRIM 800; 160 MG/1; MG/1
1 TABLET ORAL 2 TIMES DAILY
Qty: 20 TABLET | Refills: 0 | Status: SHIPPED | OUTPATIENT
Start: 2022-07-21 | End: 2022-07-31

## 2022-07-21 NOTE — PROGRESS NOTES
Patient does have a UTI. I will place an order in her chart for Bactrim. She is aware to pick it up and start it.

## 2022-07-25 ENCOUNTER — TELEPHONE (OUTPATIENT)
Dept: ORTHOPEDIC SURGERY | Age: 67
End: 2022-07-25

## 2022-07-25 NOTE — TELEPHONE ENCOUNTER
ORTHOPAEDIC NURSE NAVIGATOR SUMMARY NOTE      Anticipated Date of Surgery: 8/29/22    Recieved Pre-Op Education: yes   In person class:Yes- attended 7/25/22  Pt used educational link:No   Pt completed pre and post test to measure learning:Yes    If pt did not complete either, why not? N/A    ERAS protocol explained to pt. Pt does not have the following medical conditions:    -Gastroparesis  -CHF  -Fluid restricted diet  -Known difficult airway  Pt instructed to drink up to 40 oz of SUGAR FREE Gatorade type drink the evening prior to surgery. Pt informed they can have up to 40 oz of water and that it must be completed 2 hours prior to scheduled surgery. Pt verbalized understanding. PCP: ADITI Grullon CNP   Phone #: [phone]    Date of PCP Visit for H&P: 8/2/22    Any Noted Concerns from PCP prior to surgery:  No   If Yes, what concerns?:    IS THE PATIENT IN A PAIN MANAGEMENT PROGRAM?:   Not Applicable     Review of Past Medical History Reveals History of:      Critical Lab Values:   Hgb/Hct:   Hemoglobin (g/dL)   Date Value   07/18/2022 12.6   /  Hematocrit (%)   Date Value   07/18/2022 37.8      HgbA1C:    Lab Results   Component Value Date    LABA1C 5.7 07/18/2022    LABA1C 5.9 08/09/2021    LABA1C 5.9 08/21/2019      Albumin:    Lab Results   Component Value Date    LABALBU 4.8 07/18/2022      BUN/Cr:   BUN (mg/dL)   Date Value   07/18/2022 14   /  Creatinine (mg/dL)   Date Value   07/18/2022 0.6      BMI:    BMI Readings from Last 1 Encounters:   07/14/22 29.87 kg/m²        Coronary Artery Disease/HTN/CHF History: No      Cardiologist:     Cardiac Clearance Necessary: No    Date of Cardiac Clearance Appt: On Plavix? No,  If YES, when will they stop taking?     Final Cardiac Recommendations:N/A   -On any anticoagulation-None       Diabetes History: No    Most Recent HgbA1C: N/A    PCP or Endocrine Recommendations: N/A    Nutritionist/Dietician Consult Scheduled: N/A    Final Plan For Diabetic Control: N/A   Pulmonary: COPD/Emphysema/ Use of home oxygen: NONE     Alcohol use:None        DVT Risk Stratification:  Low      Vascular Consult Ordered:  NA    Date of Vascular Appt:     Hematology/Oncology Consult Ordered:  NA    Date of Hematology/Oncology Appt:     Final Recommendation For DVT Prophylaxis:   -Smoking history or use of estrogen-         BMI Greater than 40 at time of scheduling?: No    Has Surgeon been notified of BMI concern? Not Applicable    Weight Loss Clinic Consult Ordered: Not Applicable    Date of Wt Loss Clinic Appt:     BMI at time of surgery (if went through [facility]): Additional Medical Concerns:         Discharge Disposition Information:     Attended Pre-Hab Program: yes     Anticipated Discharge Disposition:  Home with OP PT    Who will be with patient at home following discharge?    and daughter      Equipment pt already has:  Walkers, cane, shower bench, ice machine   Bedroom on first or second floor: First   Bathroom on first or second floor: First   Weight bearing status: Full   Pre-op ambulatory status: cane   Number of entry steps: zero   Caregiver assistance: Full time    Pt plan to DC same day: Yes   Bertram Nevarez preference: OP PT Vikram Cruz RN  7/25/2022

## 2022-08-02 ENCOUNTER — TELEPHONE (OUTPATIENT)
Dept: ORTHOPEDIC SURGERY | Age: 67
End: 2022-08-02

## 2022-08-02 ENCOUNTER — OFFICE VISIT (OUTPATIENT)
Dept: FAMILY MEDICINE CLINIC | Age: 67
End: 2022-08-02
Payer: MEDICARE

## 2022-08-02 VITALS
OXYGEN SATURATION: 98 % | WEIGHT: 173 LBS | HEIGHT: 64 IN | DIASTOLIC BLOOD PRESSURE: 78 MMHG | BODY MASS INDEX: 29.53 KG/M2 | SYSTOLIC BLOOD PRESSURE: 118 MMHG | HEART RATE: 68 BPM

## 2022-08-02 DIAGNOSIS — Z01.818 PRE-OP EVALUATION: Primary | ICD-10-CM

## 2022-08-02 DIAGNOSIS — M17.12 PRIMARY OSTEOARTHRITIS OF LEFT KNEE: ICD-10-CM

## 2022-08-02 PROCEDURE — G8417 CALC BMI ABV UP PARAM F/U: HCPCS | Performed by: REGISTERED NURSE

## 2022-08-02 PROCEDURE — 93000 ELECTROCARDIOGRAM COMPLETE: CPT | Performed by: REGISTERED NURSE

## 2022-08-02 PROCEDURE — 1090F PRES/ABSN URINE INCON ASSESS: CPT | Performed by: REGISTERED NURSE

## 2022-08-02 PROCEDURE — 99213 OFFICE O/P EST LOW 20 MIN: CPT | Performed by: REGISTERED NURSE

## 2022-08-02 PROCEDURE — G8427 DOCREV CUR MEDS BY ELIG CLIN: HCPCS | Performed by: REGISTERED NURSE

## 2022-08-02 ASSESSMENT — PATIENT HEALTH QUESTIONNAIRE - PHQ9
2. FEELING DOWN, DEPRESSED OR HOPELESS: 0
SUM OF ALL RESPONSES TO PHQ QUESTIONS 1-9: 0
1. LITTLE INTEREST OR PLEASURE IN DOING THINGS: 0
SUM OF ALL RESPONSES TO PHQ9 QUESTIONS 1 & 2: 0
SUM OF ALL RESPONSES TO PHQ QUESTIONS 1-9: 0

## 2022-08-02 NOTE — TELEPHONE ENCOUNTER
Auth: NPR  Date: 08/29/22  Type of SX: INPATIENT  Location: [facility]  CPT: 27381   DX: M17.12  SX area: L KNEE  Insurance: MEDICARE A&B

## 2022-08-02 NOTE — PROGRESS NOTES
[facility]  [phone]  Fax: [phone]   Pre-operative History and Physical      DIAGNOSIS:  Primary arthritis of left knee    PROCEDURE:  LEFT TOTAL KNEE ARTHROPLASTY WITH ADDUCTOR CANAL BLOCK FOR PAIN CONTROL                  MEDACTA      History Obtained From:  patient    HISTORY OF PRESENT ILLNESS:    The patient is a 78 y.o. female with significant past medical history of HLD, primary osteoarthritis of both knees, vitamin D deficiency. I am seeing this patient for preop consultation for Dr. West Bob.        Past Medical History:   Diagnosis Date    Hyperlipidemia     Menopause     Menopause      (spontaneous vaginal delivery)          Past Surgical History:   Procedure Laterality Date    COLONOSCOPY  02/15/2001, 2006, 10/1/12     Current Outpatient Medications   Medication Sig Dispense Refill    mupirocin (BACTROBAN) 2 % ointment Apply twice daily to each nare for 5 days prior to surgical procedure 1 g 0    Red Yeast Rice Extract (RED YEAST RICE PO) Take by mouth      diclofenac (VOLTAREN) 50 MG EC tablet Take 1 tablet by mouth 2 times daily (with meals) 60 tablet 3    vitamin D (CHOLECALCIFEROL) 400 UNITS TABS tablet Take 1,000 Units by mouth daily       Multiple Vitamin (MULTI-VITAMIN PO) Take  by mouth. No current facility-administered medications for this visit. Allergies:  Penicillins  History of allergic reaction to anesthesia:  No     Social History     Tobacco Use   Smoking Status Never   Smokeless Tobacco Never     The patient states she drinks 0 per week.     Family History   Problem Relation Age of Onset    Cancer Mother         colon cancer/cervical cancer    Cataracts Mother     High Cholesterol Father     Cataracts Father     Cancer Brother         anal cancer    Breast Cancer Niece 52       REVIEW OF SYSTEMS:    CONSTITUTIONAL:  negative  EYES:  positive for  glasses  HEENT:  negative  RESPIRATORY:  negative for  dry cough, cough with sputum, and dyspnea  CARDIOVASCULAR:  negative for  chest pain, dyspnea, palpitations  GASTROINTESTINAL:  negative  GENITOURINARY:  negative- just completed a course of Bactrim for UTI  INTEGUMENT/BREAST:  negative  HEMATOLOGIC/LYMPHATIC:  negative  ALLERGIC/IMMUNOLOGIC:  negative  ENDOCRINE:  negative  MUSCULOSKELETAL:  positive for  arthralgias and bilateral knee pain  NEUROLOGICAL:  negative    PHYSICAL EXAM:      /78   Pulse 68   Ht 5' 4\" (1.626 m)   Wt 173 lb (78.5 kg)   LMP  (LMP Unknown)   SpO2 98%   BMI 29.70 kg/m²     CONSTITUTIONAL:  awake, alert, cooperative, no apparent distress, and appears stated age    Eyes:  Lids and lashes normal, pupils equal, round and reactive to light, extra ocular muscles intact, sclera clear, conjunctiva normal    Head/ENT:  Normocephalic, without obvious abnormality, atramatic, sinuses nontender on palpation, external ears without lesions, oral pharynx with moist mucus membranes, tonsils without erythema or exudates, gums normal and good dentition. Neck:  Supple, symmetrical, trachea midline, no adenopathy, thyroid symmetric, not enlarged and no tenderness, skin normal    Heart:  Normal apical impulse, regular rate and rhythm, normal S1 and S2, no S3 or S4, and no murmur noted    Lungs:  No increased work of breathing, good air exchange, clear to auscultation bilaterally, no crackles or wheezing    Abdomen:  No scars, normal bowel sounds, soft, non-distended, non-tender, no masses palpated, no hepatosplenomegally    Extremities:  No clubbing, cyanosis, or edema    NEUROLOGIC:  Awake, alert, oriented to name, place and time. Cranial nerves II-XII are grossly intact. Motor is 5 out of 5 bilaterally.         DATA:  EKG:  Date:  8/2/22  I have reviewed EKG with the following interpretation:  Impression:  Sinus rhythm    CBC with Differential:    Lab Results   Component Value Date/Time    WBC 5.9 07/18/2022 12:50 PM    RBC 4.24 07/18/2022 12:50 PM    HGB 12.6 07/18/2022 12:50 PM    HCT 37.8 07/18/2022 12:50 PM     07/18/2022 12:50 PM    MCV 89.3 07/18/2022 12:50 PM    MCH 29.8 07/18/2022 12:50 PM    MCHC 33.4 07/18/2022 12:50 PM    RDW 14.4 07/18/2022 12:50 PM    LYMPHOPCT 42.2 07/18/2022 12:50 PM    MONOPCT 6.2 07/18/2022 12:50 PM    BASOPCT 1.2 07/18/2022 12:50 PM    MONOSABS 0.4 07/18/2022 12:50 PM    LYMPHSABS 2.5 07/18/2022 12:50 PM    EOSABS 0.1 07/18/2022 12:50 PM    BASOSABS 0.1 07/18/2022 12:50 PM     CMP:    Lab Results   Component Value Date/Time     07/18/2022 12:50 PM    K 4.4 07/18/2022 12:50 PM     07/18/2022 12:50 PM    CO2 25 07/18/2022 12:50 PM    BUN 14 07/18/2022 12:50 PM    CREATININE 0.6 07/18/2022 12:50 PM    GFRAA >60 07/18/2022 12:50 PM    GFRAA >60 10/19/2012 10:02 AM    AGRATIO 2.1 07/31/2018 10:11 AM    LABGLOM >60 07/18/2022 12:50 PM    GLUCOSE 83 07/18/2022 12:50 PM    PROT 7.2 07/31/2018 10:11 AM    PROT 7.1 10/19/2012 10:02 AM    LABALBU 4.8 07/18/2022 12:50 PM    CALCIUM 9.9 07/18/2022 12:50 PM    BILITOT 0.5 07/31/2018 10:11 AM    ALKPHOS 81 07/31/2018 10:11 AM    AST 13 07/31/2018 10:11 AM    ALT 12 07/31/2018 10:11 AM     PT/INR:    Lab Results   Component Value Date/Time    PROTIME 13.0 07/18/2022 12:50 PM    INR 1.00 07/18/2022 12:50 PM     PTT:    Lab Results   Component Value Date/Time    APTT 34.8 07/18/2022 12:50 PM   [APTT}    ASSESSMENT AND PLAN:    1. Patient is a 78 y.o. female with above specified procedure planned on 8/29/22 with Dr. Saul Alejandro at [facility]. They will not require cardiology evaluation prior to procedure. 2. Stop ASA/NSAIDs medications 7-10 days prior to procedure. 3.Patient is cleared for surgery  4. Preop has been faxed to Dr. Saul Alejandro office    ADITI Galloway [address], [address] [place] [address]  [phone]

## 2022-08-08 ENCOUNTER — HOSPITAL ENCOUNTER (OUTPATIENT)
Dept: PHYSICAL THERAPY | Age: 67
Setting detail: THERAPIES SERIES
Discharge: HOME | End: 2022-08-08
Payer: MEDICARE

## 2022-08-08 PROCEDURE — 97110 THERAPEUTIC EXERCISES: CPT | Performed by: PHYSICAL THERAPIST

## 2022-08-08 PROCEDURE — 97161 PT EVAL LOW COMPLEX 20 MIN: CPT | Performed by: PHYSICAL THERAPIST

## 2022-08-08 NOTE — PLAN OF CARE
[facility], [address]  Phone: [phone]  Fax [phone]     Physical Therapy Certification    Dear Dr Gopal Cullen ,    We had the pleasure of evaluating the following patient for physical therapy services at [address] [place] [address]. A summary of our findings can be found in the initial assessment below. This includes our plan of care. If you have any questions or concerns regarding these findings, please do not hesitate to contact me at the office phone number checked above. Thank you for the referral.       Physician Signature:_______________________________Date:__________________  By signing above (or electronic signature), therapists plan is approved by physician    Patient: Lazarus Curly   : 1955   MRN: [id number]    Referring Physician: Nilam Osborn MD         Evaluation Date: 2022           Date of Referral:22    Insurance:Medicare    Next MD appt: scheduled:NA    Medical Diagnosis: M17.12 (ICD-10-CM) - Primary osteoarthritis of left knee      Treatment Diagnosis:L knee pain  (M25.562)        Precautions/ Contra-indications: NA  Latex Allergy:  [x]NO      []YES  Preferred Language for Healthcare:   [x]English       []other:    SUBJECTIVE: Patient stated complaint:difficulty walking distances or doing things like bowling. Relevant Medical History:B knee OA    Height:5'4\" Weight:173 lbs  Pain Scale: 3-7/10  Easing factors: rest  Provocative factors:  walking      Type: []Constant   [x]Intermittent  []Radiating []Localized []other:     Numbness/Tingling: none    Occupation/School: retired    Living Status/Prior Level of Function: Independent with ADLs and IADLs, lives w  and grown children who are nurse nearby.   Pt lives in [place] however has handicap accessible     OBJECTIVE:                    Functional Mobility/Transfers: pt able to perform requested tasks      Gait: (include devices/WB status) ambulates w SPC since she tore her R achilles. Orthopedic Special Tests: see prehab assessment                       [x] Patient history, allergies, meds reviewed. Medical chart reviewed. See intake form. Review Of Systems (ROS):  [x]Performed Review of systems (Integumentary, CardioPulmonary, Neurological) by intake and observation. Intake form has been scanned into medical record. Patient has been instructed to contact their primary care physician regarding ROS issues if not already being addressed at this time. Co-morbidities/Complexities (which will affect course of rehabilitation):   []None           Arthritic conditions   []Rheumatoid arthritis (M05.9)  [x]Osteoarthritis (M19.91)   Cardiovascular conditions   []Hypertension (I10)  []Hyperlipidemia (E78.5)  []Angina pectoris (I20)  []Atherosclerosis (I70)   Musculoskeletal conditions   []Disc pathology   []Congenital spine pathologies   []Prior surgical intervention  []Osteoporosis (M81.8)  []Osteopenia (M85.8)   Endocrine conditions   []Hypothyroid (E03.9)  []Hyperthyroid Gastrointestinal conditions   []Constipation (L37.56)   Metabolic conditions   []Morbid obesity (E66.01)  []Diabetes type 1(E10.65) or 2 (E11.65)   []Neuropathy (G60.9)     Pulmonary conditions   []Asthma (J45)  []Coughing   []COPD (J44.9)   Psychological Disorders  []Anxiety (F41.9)  []Depression (F32.9)   []Other:   []Other:          Barriers to/and or personal factors that will affect rehab potential:              []Age  []Sex              []Motivation/Lack of Motivation                        []Co-Morbidities              []Cognitive Function, education/learning barriers              []Environmental, home barriers              []profession/work barriers  []past PT/medical experience  []other:  Justification:     Falls Risk Assessment (30 days): Pt indicates no hx of falls or balance issues over the past year.   There is no apparent indication of need for fall reduction program during clinic observation. [x] Falls Risk assessed and no intervention required. [] Falls Risk assessed and Patient requires intervention due to being higher risk   TUG score (>12s at risk):     [] Falls education provided, including         ASSESSMENT:   Functional Impairments:     []Noted lumbar/proximal hip/LE joint hypomobility   [x]Decreased LE functional ROM   []Decreased core/proximal hip strength and neuromuscular control   [x]Decreased LE functional strength   [x]Reduced balance/proprioceptive control   []other:      Functional Activity Limitations (from functional questionnaire and intake)   []Reduced ability to tolerate prolonged functional positions   []Reduced ability or difficulty with changes of positions or transfers between positions   []Reduced ability to maintain good posture and demonstrate good body mechanics with sitting, bending, and lifting   []Reduced ability to sleep   [] Reduced ability or tolerance with driving and/or computer work   []Reduced ability to perform lifting, carrying tasks   []Reduced ability to squat   []Reduced ability to forward bend   [x]Reduced ability to ambulate prolonged functional periods/distances/surfaces   [x]Reduced ability to ascend/descend stairs   []Reduced ability to run, hop, cut or jump   []other:    Participation Restrictions   []Reduced participation in self care activities   [x]Reduced participation in home management activities   [x]Reduced participation in work activities   [x]Reduced participation in social activities. []Reduced participation in sport/recreation activities. Classification :    []Signs/symptoms consistent with post-surgical status including decreased ROM, strength and function.    []Signs/symptoms consistent with joint sprain/strain   []Signs/symptoms consistent with patella-femoral syndrome   [x]Signs/symptoms consistent with knee OA/hip OA   []Signs/symptoms consistent with internal derangement of knee/Hip   []Signs/symptoms consistent with functional hip weakness/NMR control      []Signs/symptoms consistent with tendinitis/tendinosis    []signs/symptoms consistent with pathology which may benefit from Dry needling      []other:      Prognosis/Rehab Potential:      []Excellent   [x]Good    []Fair   []Poor    Tolerance of evaluation/treatment:    []Excellent   [x]Good    []Fair   []Poor    Physical Therapy Evaluation Complexity Justification  [x] A history of present problem with:  [x] no personal factors and/or comorbidities that impact the plan of care;  []1-2 personal factors and/or comorbidities that impact the plan of care  []3 personal factors and/or comorbidities that impact the plan of care    [x] An examination of body systems using standardized tests and measures addressing any of the following: body structures and functions (impairments), activity limitations, and/or participation restrictions;:  [x] a total of 1-2 or more elements   [] a total of 3 or more elements   [] a total of 4 or more elements     [x] A clinical presentation with:  [x] stable and/or uncomplicated characteristics   [] evolving clinical presentation with changing characteristics  [] unstable and unpredictable characteristics;     [x] Clinical decision making of [x] low, [] moderate, [] high complexity using standardized patient assessment instrument and/or measurable assessment of functional outcome.     [x] EVAL (LOW) 62945 (typically 20 minutes face-to-face)  [] EVAL (MOD) 40825 (typically 30 minutes face-to-face)  [] EVAL (HIGH) 85726 (typically 45 minutes face-to-face)  [] RE-EVAL       PLAN:   Frequency/Duration:  2 days per week for 4 Weeks:  Interventions:  []  Therapeutic exercise including: strength training, ROM, for Lower extremity and core   []  NMR activation and proprioception for LE, Glutes and Core   []  Manual therapy as indicated for LE, Hip and spine to include: Dry Needling/IASTM, STM, PROM, Gr I-IV mobilizations, manipulation. [] Modalities as needed that may include: thermal agents, E-stim, Biofeedback, US, iontophoresis as indicated  [x] Patient education on joint protection, postural re-education, activity modification, progression of HEP. [x] Patient appears to be functionally prepared for surgery and will continue with a home exercise program until surgery date. [] Patient will be ready for surgery with a short period of structured physical therapy  [] Patient does not appear to be functionally ready for surgery and requires a prolonged  structure program    At this point, it appears patient's post-operative discharge status from hospital should be:   [x] Home with home exercise program and outpatient PT  [] Home with home health care and   [] Skilled nursing facility/ Inpatient Rehab    HEP instruction: [employer]  Access Code: [email or web address]  URL: [email or web address]  Date: 08/08/2022  Prepared by: Faby Kim    GOALS:  Patient stated goal: to have the surgery and not have pain    Therapist goals for Patient:   Short Term Goals: To be achieved in: 1 visit    1. Independent in HEP and progression per patient tolerance, in order to prevent re-injury.    [] Progressing: [x] Met: [] Not Met: [] Adjusted      Electronically signed by:  Natalya Ozuna, [address] [facility]

## 2022-08-08 NOTE — FLOWSHEET NOTE
posture, motor skill, proprioception  to assist with LE, proximal hip, and core control in self care, mobility, lifting, ambulation and eccentric single leg control. NMR and Therapeutic Activities:    [] (07455 or 42418) Provided verbal/tactile cueing for activities related to improving balance, coordination, kinesthetic sense, posture, motor skill, proprioception and motor activation to allow for proper function of core, proximal hip and LE with self care and ADLs  [] (87800) Gait Re-education- Provided training and instruction to the patient for proper LE, core and proximal hip recruitment and positioning and eccentric body weight control with ambulation re-education including up and down stairs     Home Exercise Program:    [x] (85243) Reviewed/Progressed HEP activities related to strengthening, flexibility, endurance, ROM of core, proximal hip and LE for functional self-care, mobility, lifting and ambulation/stair navigation   [] (66564)Reviewed/Progressed HEP activities related to improving balance, coordination, kinesthetic sense, posture, motor skill, proprioception of core, proximal hip and LE for self care, mobility, lifting, and ambulation/stair navigation      Manual Treatments:  PROM / STM / Oscillations-Mobs:  G-I, II, III, IV (PA's, Inf., Post.)  [] (61709) Provided manual therapy to mobilize LE, proximal hip and/or LS spine soft tissue/joints for the purpose of modulating pain, promoting relaxation,  increasing ROM, reducing/eliminating soft tissue swelling/inflammation/restriction, improving soft tissue extensibility and allowing for proper ROM for normal function with self care, mobility, lifting and ambulation.      Modalities:      Charges:  Timed Code Treatment Minutes: 23   Total Treatment Minutes: 43     [x] EVAL (LOW) 10523 (typically 20 minutes face-to-face)  [] EVAL (MOD) 93346 (typically 30 minutes face-to-face)  [] EVAL (HIGH) 04287 (typically 45 minutes face-to-face)  [] RE-EVAL [x] LY(28016) x  2   [] IONTO  [] NMR (74338) x     [] VASO  [] Manual (30647) x      [] Other:  [] TA x      [] Mech Traction (96727)  [] ES(attended) (75766)      [] ES (un) (28529):     GOALS:   Patient stated goal: To be prepared for surgery      Therapist goals for Patient:    Short Term Goals: To be achieved in: 1 visit    1. Independent in HEP and progression per patient tolerance, in order to prevent re-injury and to prepare for surgery by strength and flexibility therex . Progression Towards Functional goals:  [] Patient is progressing as expected towards functional goals listed. [] Progression is slowed due to complexities listed. [] Progression has been slowed due to co-morbidities.   [] Plan just implemented, too soon to assess goals progression  [x] Other:  goal met    ASSESSMENT:  See eval    Treatment/Activity Tolerance:  [x] Patient tolerated treatment well [] Patient limited by fatique  [] Patient limited by pain  [] Patient limited by other medical complications  [] Other:   [] Patient did not tolerate physical therapy activity due to substantial increase in pain    Prognosis: [x] Good [] Fair  [] Poor          Patient Requires Follow-up: [] Yes  [x] No    PLAN: See eval       [] Continue per plan of care  [] Alter current plan (see comments)  [] Plan of care initiated [x] Discharge    [] Discharge to home program at this time due to inability to tolerate physical therapy activity       Electronically signed by: Anna Brady, [id number]

## 2022-08-18 ENCOUNTER — ANESTHESIA EVENT (OUTPATIENT)
Dept: OPERATING ROOM | Age: 67
End: 2022-08-18
Payer: MEDICARE

## 2022-08-22 ENCOUNTER — HOSPITAL ENCOUNTER (OUTPATIENT)
Dept: WOMENS IMAGING | Age: 67
Discharge: HOME | End: 2022-08-22
Payer: MEDICARE

## 2022-08-22 DIAGNOSIS — Z12.31 ENCOUNTER FOR SCREENING MAMMOGRAM FOR BREAST CANCER: ICD-10-CM

## 2022-08-22 PROCEDURE — 77067 SCR MAMMO BI INCL CAD: CPT

## 2022-08-23 NOTE — PROGRESS NOTES
1. Do not eat or drink anything after 12 midnight prior to surgery. This includes no water, chewing gum mints, or ice chips. You may brush your teeth and gargle the day of surgery but DO NOT SWALLOW THE WATER. 2. Please see your family doctor/pediatrician for a history and physical and/or concerning medications. Bring any test results/reports from your physician's office. If you are under the care of a heart doctor or specialist please be aware that you may be asked to see him or her for clearance. 3. You may be asked to stop blood thinners such as Coumadin, Plavix, Fragmin, and Lovenox or Anti-inflammatories such as Aspirin, Ibuprofen, Advil, and Naproxen prior to your surgery. Please check with your doctor before stopping these or any other medications. 4. Do not smoke, and do not drink any alcoholic beverages 24 hours prior to surgery. 5. You MUST make arrangements for a responsible adult to take you home after your surgery. For your safety, you will not be allowed to leave alone or drive yourself home. Your surgery will be cancelled if you do not have a ride home. Also for your safety, it is strongly suggested someone stay with you the first 24 hrs after your surgery. 6. A parent/legal guardian must accompany a child scheduled for surgery and plan to stay at the hospital until the child is discharged. Please do not bring other children with you. 7. For your comfort,please wear simple, loose fitting clothing to the hospital.  Please do not bring valuables (money, credit cards, checkbooks, etc.) Do not wear any makeup (including no eye makeup) or nail polish on your fingers or toes. 8. For your safety, please DO NOT wear any jewelry or piercings on day of surgery. All body piercing jewelry must be removed. 9. If you have dentures, they will be removed before going to the OR; for your convenience we will provide you with a container.   If you wear contact lenses or glasses, they will be removed, they will be removed, please bring a case for them. 10. If appicable,Please see your family doctor/pediatrician for a history & physical and/or concerning medications. Bring any test results/reports from your physician's office. 11. Remember to bring Blood Bank bracelet to the hospital on the day of surgery. 12. If you have a Living Will and Durable Power of  for Healthcare, please bring in a copy. 15. Notify your Surgeon if you develop any illness between now and surgery  time, cough, cold, fever, sore throat, nausea, vomiting, etc.  Please notify your surgeon if you experience dizziness, shortness of breath or blurred vision between now & the time of your surgery   14. DO NOT shave your operative site 96 hours prior to surgery. For face & neck surgery, men may use an electric razor 48 hours prior to surgery. 15. Shower the night before surgery with __X_Antibacterial soap _X__Hibiclens   16. To provide excellent care visitors will be limited to one in the room at any given time. 17.  Please bring picture ID and insurance card. 18.  Visit our web site for additional information:  [employer]/surgery.

## 2022-08-26 ENCOUNTER — TELEPHONE (OUTPATIENT)
Dept: ORTHOPEDIC SURGERY | Age: 67
End: 2022-08-26

## 2022-08-29 ENCOUNTER — HOSPITAL ENCOUNTER (OUTPATIENT)
Age: 67
Discharge: HOME | End: 2022-08-29
Attending: ORTHOPAEDIC SURGERY | Admitting: ORTHOPAEDIC SURGERY
Payer: MEDICARE

## 2022-08-29 ENCOUNTER — APPOINTMENT (OUTPATIENT)
Dept: GENERAL RADIOLOGY | Age: 67
End: 2022-08-29
Attending: ORTHOPAEDIC SURGERY
Payer: MEDICARE

## 2022-08-29 ENCOUNTER — ANESTHESIA (OUTPATIENT)
Dept: OPERATING ROOM | Age: 67
End: 2022-08-29
Payer: MEDICARE

## 2022-08-29 VITALS
SYSTOLIC BLOOD PRESSURE: 133 MMHG | HEIGHT: 64 IN | TEMPERATURE: 97.2 F | BODY MASS INDEX: 29.98 KG/M2 | RESPIRATION RATE: 15 BRPM | DIASTOLIC BLOOD PRESSURE: 66 MMHG | HEART RATE: 83 BPM | OXYGEN SATURATION: 95 % | WEIGHT: 175.6 LBS

## 2022-08-29 DIAGNOSIS — Z96.652 S/P TOTAL KNEE ARTHROPLASTY, LEFT: Primary | ICD-10-CM

## 2022-08-29 DIAGNOSIS — M17.12 LOCALIZED OSTEOARTHRITIS OF LEFT KNEE: Primary | ICD-10-CM

## 2022-08-29 LAB
ABO/RH: NORMAL
ANTIBODY SCREEN: NORMAL
GLUCOSE BLD-MCNC: 106 MG/DL (ref 70–99)
PERFORMED ON: ABNORMAL

## 2022-08-29 PROCEDURE — 73560 X-RAY EXAM OF KNEE 1 OR 2: CPT

## 2022-08-29 PROCEDURE — 27447 TOTAL KNEE ARTHROPLASTY: CPT | Performed by: ORTHOPAEDIC SURGERY

## 2022-08-29 PROCEDURE — 2580000003 HC RX 258

## 2022-08-29 PROCEDURE — 2709999900 HC NON-CHARGEABLE SUPPLY: Performed by: ORTHOPAEDIC SURGERY

## 2022-08-29 PROCEDURE — 7100000011 HC PHASE II RECOVERY - ADDTL 15 MIN: Performed by: ORTHOPAEDIC SURGERY

## 2022-08-29 PROCEDURE — 6360000002 HC RX W HCPCS

## 2022-08-29 PROCEDURE — 2500000003 HC RX 250 WO HCPCS: Performed by: ORTHOPAEDIC SURGERY

## 2022-08-29 PROCEDURE — 6360000002 HC RX W HCPCS: Performed by: ANESTHESIOLOGY

## 2022-08-29 PROCEDURE — 7100000000 HC PACU RECOVERY - FIRST 15 MIN: Performed by: ORTHOPAEDIC SURGERY

## 2022-08-29 PROCEDURE — C2626 INFUSION PUMP, NON-PROG,TEMP: HCPCS | Performed by: ORTHOPAEDIC SURGERY

## 2022-08-29 PROCEDURE — 86900 BLOOD TYPING SEROLOGIC ABO: CPT

## 2022-08-29 PROCEDURE — 2720000010 HC SURG SUPPLY STERILE: Performed by: ORTHOPAEDIC SURGERY

## 2022-08-29 PROCEDURE — 97530 THERAPEUTIC ACTIVITIES: CPT

## 2022-08-29 PROCEDURE — 7100000010 HC PHASE II RECOVERY - FIRST 15 MIN: Performed by: ORTHOPAEDIC SURGERY

## 2022-08-29 PROCEDURE — 6370000000 HC RX 637 (ALT 250 FOR IP): Performed by: ANESTHESIOLOGY

## 2022-08-29 PROCEDURE — 3700000000 HC ANESTHESIA ATTENDED CARE: Performed by: ORTHOPAEDIC SURGERY

## 2022-08-29 PROCEDURE — 6360000002 HC RX W HCPCS: Performed by: PHYSICIAN ASSISTANT

## 2022-08-29 PROCEDURE — 86901 BLOOD TYPING SEROLOGIC RH(D): CPT

## 2022-08-29 PROCEDURE — 7100000001 HC PACU RECOVERY - ADDTL 15 MIN: Performed by: ORTHOPAEDIC SURGERY

## 2022-08-29 PROCEDURE — A4216 STERILE WATER/SALINE, 10 ML: HCPCS | Performed by: ORTHOPAEDIC SURGERY

## 2022-08-29 PROCEDURE — 97161 PT EVAL LOW COMPLEX 20 MIN: CPT

## 2022-08-29 PROCEDURE — 3600000004 HC SURGERY LEVEL 4 BASE: Performed by: ORTHOPAEDIC SURGERY

## 2022-08-29 PROCEDURE — 6360000002 HC RX W HCPCS: Performed by: ORTHOPAEDIC SURGERY

## 2022-08-29 PROCEDURE — 3600000014 HC SURGERY LEVEL 4 ADDTL 15MIN: Performed by: ORTHOPAEDIC SURGERY

## 2022-08-29 PROCEDURE — 2500000003 HC RX 250 WO HCPCS

## 2022-08-29 PROCEDURE — C1713 ANCHOR/SCREW BN/BN,TIS/BN: HCPCS | Performed by: ORTHOPAEDIC SURGERY

## 2022-08-29 PROCEDURE — 3700000001 HC ADD 15 MINUTES (ANESTHESIA): Performed by: ORTHOPAEDIC SURGERY

## 2022-08-29 PROCEDURE — 97116 GAIT TRAINING THERAPY: CPT

## 2022-08-29 PROCEDURE — A4217 STERILE WATER/SALINE, 500 ML: HCPCS | Performed by: ORTHOPAEDIC SURGERY

## 2022-08-29 PROCEDURE — C1776 JOINT DEVICE (IMPLANTABLE): HCPCS | Performed by: ORTHOPAEDIC SURGERY

## 2022-08-29 PROCEDURE — 97110 THERAPEUTIC EXERCISES: CPT

## 2022-08-29 PROCEDURE — 2580000003 HC RX 258: Performed by: ORTHOPAEDIC SURGERY

## 2022-08-29 PROCEDURE — 86850 RBC ANTIBODY SCREEN: CPT

## 2022-08-29 DEVICE — FEMUR SPHERE CEMENTED LEFT, SIZE 3+
Type: IMPLANTABLE DEVICE | Status: FUNCTIONAL
Brand: GMK SPHERE TOTAL KNEE SYSTEM

## 2022-08-29 DEVICE — TIBIAL INSERT FIXED SPHERE FLEX #3/10 MM L
Type: IMPLANTABLE DEVICE | Status: FUNCTIONAL
Brand: GMK SPHERE TOTAL KNEE SYSTEM

## 2022-08-29 DEVICE — RESURFACING PATELLA SIZE 3
Type: IMPLANTABLE DEVICE | Status: FUNCTIONAL
Brand: GMK PRIMARY TOTAL KNEE SYSTEM

## 2022-08-29 DEVICE — FIXED TIBIAL TRAY CEMENTED LEFT, SIZE 3
Type: IMPLANTABLE DEVICE | Status: FUNCTIONAL
Brand: GMK PRIMARY TOTAL KNEE SYSTEM

## 2022-08-29 DEVICE — CEMENT BNE 40 GM RADIOPAQUE BA SIMPLEX P: Type: IMPLANTABLE DEVICE | Status: FUNCTIONAL

## 2022-08-29 DEVICE — UPCHARGE KNEE WEDGE/AUGMENT MEDACTA: Type: IMPLANTABLE DEVICE | Status: FUNCTIONAL

## 2022-08-29 RX ORDER — OXYCODONE HYDROCHLORIDE 5 MG/1
5 TABLET ORAL EVERY 6 HOURS PRN
Qty: 28 TABLET | Refills: 0 | Status: SHIPPED | OUTPATIENT
Start: 2022-08-29 | End: 2022-09-05

## 2022-08-29 RX ORDER — ONDANSETRON 2 MG/ML
INJECTION INTRAMUSCULAR; INTRAVENOUS PRN
Status: DISCONTINUED | OUTPATIENT
Start: 2022-08-29 | End: 2022-08-29 | Stop reason: SDUPTHER

## 2022-08-29 RX ORDER — SENNA PLUS 8.6 MG/1
1 TABLET ORAL DAILY PRN
Status: CANCELLED | OUTPATIENT
Start: 2022-08-29

## 2022-08-29 RX ORDER — OXYCODONE HYDROCHLORIDE 5 MG/1
10 TABLET ORAL PRN
Status: DISCONTINUED | OUTPATIENT
Start: 2022-08-29 | End: 2022-08-29 | Stop reason: HOSPADM

## 2022-08-29 RX ORDER — ASPIRIN 81 MG/1
81 TABLET ORAL 2 TIMES DAILY
Qty: 60 TABLET | Refills: 0 | Status: SHIPPED | OUTPATIENT
Start: 2022-08-29 | End: 2022-11-03 | Stop reason: ALTCHOICE

## 2022-08-29 RX ORDER — OXYCODONE HYDROCHLORIDE 5 MG/1
10 TABLET ORAL EVERY 4 HOURS PRN
Status: CANCELLED | OUTPATIENT
Start: 2022-08-29

## 2022-08-29 RX ORDER — SODIUM CHLORIDE 0.9 % (FLUSH) 0.9 %
5-40 SYRINGE (ML) INJECTION PRN
Status: DISCONTINUED | OUTPATIENT
Start: 2022-08-29 | End: 2022-08-29 | Stop reason: HOSPADM

## 2022-08-29 RX ORDER — OXYCODONE HYDROCHLORIDE 5 MG/1
5 TABLET ORAL PRN
Status: DISCONTINUED | OUTPATIENT
Start: 2022-08-29 | End: 2022-08-29 | Stop reason: HOSPADM

## 2022-08-29 RX ORDER — ACETAMINOPHEN 500 MG
1000 TABLET ORAL ONCE
Status: COMPLETED | OUTPATIENT
Start: 2022-08-29 | End: 2022-08-29

## 2022-08-29 RX ORDER — LABETALOL HYDROCHLORIDE 5 MG/ML
5 INJECTION, SOLUTION INTRAVENOUS EVERY 10 MIN PRN
Status: DISCONTINUED | OUTPATIENT
Start: 2022-08-29 | End: 2022-08-29 | Stop reason: HOSPADM

## 2022-08-29 RX ORDER — SODIUM CHLORIDE, SODIUM LACTATE, POTASSIUM CHLORIDE, CALCIUM CHLORIDE 600; 310; 30; 20 MG/100ML; MG/100ML; MG/100ML; MG/100ML
INJECTION, SOLUTION INTRAVENOUS CONTINUOUS PRN
Status: DISCONTINUED | OUTPATIENT
Start: 2022-08-29 | End: 2022-08-29 | Stop reason: SDUPTHER

## 2022-08-29 RX ORDER — SODIUM CHLORIDE 9 MG/ML
INJECTION, SOLUTION INTRAVENOUS PRN
Status: DISCONTINUED | OUTPATIENT
Start: 2022-08-29 | End: 2022-08-29 | Stop reason: HOSPADM

## 2022-08-29 RX ORDER — METHYLPREDNISOLONE 4 MG/1
TABLET ORAL
Qty: 1 KIT | Refills: 0 | Status: SHIPPED | OUTPATIENT
Start: 2022-08-29 | End: 2022-11-03 | Stop reason: ALTCHOICE

## 2022-08-29 RX ORDER — FENTANYL CITRATE 50 UG/ML
INJECTION, SOLUTION INTRAMUSCULAR; INTRAVENOUS PRN
Status: DISCONTINUED | OUTPATIENT
Start: 2022-08-29 | End: 2022-08-29 | Stop reason: SDUPTHER

## 2022-08-29 RX ORDER — CYCLOBENZAPRINE HCL 10 MG
10 TABLET ORAL 3 TIMES DAILY PRN
Qty: 30 TABLET | Refills: 0 | Status: SHIPPED | OUTPATIENT
Start: 2022-08-29 | End: 2022-09-08

## 2022-08-29 RX ORDER — SODIUM CHLORIDE 0.9 % (FLUSH) 0.9 %
5-40 SYRINGE (ML) INJECTION EVERY 12 HOURS SCHEDULED
Status: CANCELLED | OUTPATIENT
Start: 2022-08-29

## 2022-08-29 RX ORDER — MORPHINE SULFATE 4 MG/ML
4 INJECTION, SOLUTION INTRAMUSCULAR; INTRAVENOUS
Status: CANCELLED | OUTPATIENT
Start: 2022-08-29

## 2022-08-29 RX ORDER — SODIUM CHLORIDE, SODIUM LACTATE, POTASSIUM CHLORIDE, CALCIUM CHLORIDE 600; 310; 30; 20 MG/100ML; MG/100ML; MG/100ML; MG/100ML
INJECTION, SOLUTION INTRAVENOUS CONTINUOUS
Status: DISCONTINUED | OUTPATIENT
Start: 2022-08-29 | End: 2022-08-29 | Stop reason: HOSPADM

## 2022-08-29 RX ORDER — ONDANSETRON 8 MG/1
4 TABLET, ORALLY DISINTEGRATING ORAL EVERY 8 HOURS PRN
Status: CANCELLED | OUTPATIENT
Start: 2022-08-29

## 2022-08-29 RX ORDER — MELOXICAM 7.5 MG/1
3.75 TABLET ORAL DAILY
Status: CANCELLED | OUTPATIENT
Start: 2022-08-29 | End: 2022-09-01

## 2022-08-29 RX ORDER — MAGNESIUM HYDROXIDE 1200 MG/15ML
LIQUID ORAL CONTINUOUS PRN
Status: DISCONTINUED | OUTPATIENT
Start: 2022-08-29 | End: 2022-08-29 | Stop reason: HOSPADM

## 2022-08-29 RX ORDER — HYDROMORPHONE HCL 110MG/55ML
PATIENT CONTROLLED ANALGESIA SYRINGE INTRAVENOUS PRN
Status: DISCONTINUED | OUTPATIENT
Start: 2022-08-29 | End: 2022-08-29 | Stop reason: SDUPTHER

## 2022-08-29 RX ORDER — PROPOFOL 10 MG/ML
INJECTION, EMULSION INTRAVENOUS CONTINUOUS PRN
Status: DISCONTINUED | OUTPATIENT
Start: 2022-08-29 | End: 2022-08-29 | Stop reason: SDUPTHER

## 2022-08-29 RX ORDER — TRANEXAMIC ACID 100 MG/ML
INJECTION, SOLUTION INTRAVENOUS PRN
Status: DISCONTINUED | OUTPATIENT
Start: 2022-08-29 | End: 2022-08-29 | Stop reason: SDUPTHER

## 2022-08-29 RX ORDER — LIDOCAINE HYDROCHLORIDE 10 MG/ML
1 INJECTION, SOLUTION EPIDURAL; INFILTRATION; INTRACAUDAL; PERINEURAL
Status: DISCONTINUED | OUTPATIENT
Start: 2022-08-29 | End: 2022-08-29 | Stop reason: HOSPADM

## 2022-08-29 RX ORDER — ACETAMINOPHEN 325 MG/1
650 TABLET ORAL EVERY 6 HOURS
Status: CANCELLED | OUTPATIENT
Start: 2022-08-29

## 2022-08-29 RX ORDER — MEPERIDINE HYDROCHLORIDE 50 MG/ML
12.5 INJECTION INTRAMUSCULAR; INTRAVENOUS; SUBCUTANEOUS EVERY 5 MIN PRN
Status: DISCONTINUED | OUTPATIENT
Start: 2022-08-29 | End: 2022-08-29 | Stop reason: HOSPADM

## 2022-08-29 RX ORDER — CLINDAMYCIN HYDROCHLORIDE 300 MG/1
CAPSULE ORAL
Qty: 6 CAPSULE | Refills: 0 | Status: SHIPPED | OUTPATIENT
Start: 2022-08-29 | End: 2022-11-03 | Stop reason: ALTCHOICE

## 2022-08-29 RX ORDER — MAGNESIUM SULFATE IN WATER 40 MG/ML
2000 INJECTION, SOLUTION INTRAVENOUS ONCE
Status: COMPLETED | OUTPATIENT
Start: 2022-08-29 | End: 2022-08-29

## 2022-08-29 RX ORDER — MORPHINE SULFATE 2 MG/ML
2 INJECTION, SOLUTION INTRAMUSCULAR; INTRAVENOUS
Status: CANCELLED | OUTPATIENT
Start: 2022-08-29

## 2022-08-29 RX ORDER — ONDANSETRON 2 MG/ML
4 INJECTION INTRAMUSCULAR; INTRAVENOUS EVERY 6 HOURS PRN
Status: CANCELLED | OUTPATIENT
Start: 2022-08-29

## 2022-08-29 RX ORDER — MELOXICAM 15 MG/1
15 TABLET ORAL DAILY
Qty: 30 TABLET | Refills: 0 | Status: SHIPPED | OUTPATIENT
Start: 2022-08-29 | End: 2022-09-28 | Stop reason: SDUPTHER

## 2022-08-29 RX ORDER — ONDANSETRON 4 MG/1
4 TABLET, FILM COATED ORAL 3 TIMES DAILY PRN
Qty: 15 TABLET | Refills: 0 | Status: SHIPPED | OUTPATIENT
Start: 2022-08-29 | End: 2022-11-03 | Stop reason: ALTCHOICE

## 2022-08-29 RX ORDER — ROCURONIUM BROMIDE 10 MG/ML
INJECTION, SOLUTION INTRAVENOUS PRN
Status: DISCONTINUED | OUTPATIENT
Start: 2022-08-29 | End: 2022-08-29 | Stop reason: SDUPTHER

## 2022-08-29 RX ORDER — POLYETHYLENE GLYCOL 3350 17 G/17G
17 POWDER, FOR SOLUTION ORAL DAILY
Status: CANCELLED | OUTPATIENT
Start: 2022-08-29

## 2022-08-29 RX ORDER — SODIUM CHLORIDE 0.9 % (FLUSH) 0.9 %
5-40 SYRINGE (ML) INJECTION PRN
Status: CANCELLED | OUTPATIENT
Start: 2022-08-29

## 2022-08-29 RX ORDER — CELECOXIB 100 MG/1
200 CAPSULE ORAL ONCE
Status: COMPLETED | OUTPATIENT
Start: 2022-08-29 | End: 2022-08-29

## 2022-08-29 RX ORDER — SODIUM CHLORIDE 0.9 % (FLUSH) 0.9 %
5-40 SYRINGE (ML) INJECTION EVERY 12 HOURS SCHEDULED
Status: DISCONTINUED | OUTPATIENT
Start: 2022-08-29 | End: 2022-08-29 | Stop reason: HOSPADM

## 2022-08-29 RX ORDER — ONDANSETRON 2 MG/ML
4 INJECTION INTRAMUSCULAR; INTRAVENOUS
Status: DISCONTINUED | OUTPATIENT
Start: 2022-08-29 | End: 2022-08-29 | Stop reason: HOSPADM

## 2022-08-29 RX ORDER — SODIUM CHLORIDE 9 MG/ML
INJECTION, SOLUTION INTRAVENOUS PRN
Status: CANCELLED | OUTPATIENT
Start: 2022-08-29

## 2022-08-29 RX ORDER — KETOROLAC TROMETHAMINE 30 MG/ML
INJECTION, SOLUTION INTRAMUSCULAR; INTRAVENOUS PRN
Status: DISCONTINUED | OUTPATIENT
Start: 2022-08-29 | End: 2022-08-29 | Stop reason: SDUPTHER

## 2022-08-29 RX ORDER — OXYCODONE HYDROCHLORIDE 5 MG/1
5 TABLET ORAL EVERY 4 HOURS PRN
Status: CANCELLED | OUTPATIENT
Start: 2022-08-29

## 2022-08-29 RX ORDER — VANCOMYCIN HYDROCHLORIDE 1 G/20ML
INJECTION, POWDER, LYOPHILIZED, FOR SOLUTION INTRAVENOUS PRN
Status: DISCONTINUED | OUTPATIENT
Start: 2022-08-29 | End: 2022-08-29 | Stop reason: HOSPADM

## 2022-08-29 RX ORDER — BUPIVACAINE HYDROCHLORIDE 7.5 MG/ML
INJECTION, SOLUTION INTRASPINAL
Status: COMPLETED | OUTPATIENT
Start: 2022-08-29 | End: 2022-08-29

## 2022-08-29 RX ORDER — DIPHENHYDRAMINE HYDROCHLORIDE 50 MG/ML
12.5 INJECTION INTRAMUSCULAR; INTRAVENOUS
Status: DISCONTINUED | OUTPATIENT
Start: 2022-08-29 | End: 2022-08-29 | Stop reason: HOSPADM

## 2022-08-29 RX ORDER — SODIUM CHLORIDE, SODIUM LACTATE, POTASSIUM CHLORIDE, CALCIUM CHLORIDE 600; 310; 30; 20 MG/100ML; MG/100ML; MG/100ML; MG/100ML
INJECTION, SOLUTION INTRAVENOUS CONTINUOUS
Status: CANCELLED | OUTPATIENT
Start: 2022-08-29

## 2022-08-29 RX ADMIN — HYDROMORPHONE HYDROCHLORIDE 0.5 MG: 2 INJECTION INTRAMUSCULAR; INTRAVENOUS; SUBCUTANEOUS at 11:10

## 2022-08-29 RX ADMIN — TRANEXAMIC ACID 1000 MG: 100 INJECTION, SOLUTION INTRAVENOUS at 10:39

## 2022-08-29 RX ADMIN — FENTANYL CITRATE 50 MCG: 50 INJECTION INTRAMUSCULAR; INTRAVENOUS at 09:44

## 2022-08-29 RX ADMIN — SODIUM CHLORIDE, SODIUM LACTATE, POTASSIUM CHLORIDE, AND CALCIUM CHLORIDE: .6; .31; .03; .02 INJECTION, SOLUTION INTRAVENOUS at 08:41

## 2022-08-29 RX ADMIN — BUPIVACAINE HYDROCHLORIDE 10.5 MG: 7.5 INJECTION, SOLUTION SUBARACHNOID at 09:02

## 2022-08-29 RX ADMIN — TRANEXAMIC ACID 1000 MG: 100 INJECTION, SOLUTION INTRAVENOUS at 09:28

## 2022-08-29 RX ADMIN — FENTANYL CITRATE 50 MCG: 50 INJECTION INTRAMUSCULAR; INTRAVENOUS at 10:15

## 2022-08-29 RX ADMIN — CELECOXIB 200 MG: 100 CAPSULE ORAL at 07:32

## 2022-08-29 RX ADMIN — ONDANSETRON 4 MG: 2 INJECTION INTRAMUSCULAR; INTRAVENOUS at 08:41

## 2022-08-29 RX ADMIN — PROPOFOL 100 MG: 10 INJECTION, EMULSION INTRAVENOUS at 09:36

## 2022-08-29 RX ADMIN — HYDROMORPHONE HYDROCHLORIDE 0.5 MG: 1 INJECTION, SOLUTION INTRAMUSCULAR; INTRAVENOUS; SUBCUTANEOUS at 11:39

## 2022-08-29 RX ADMIN — CEFAZOLIN 2000 MG: 10 INJECTION, POWDER, FOR SOLUTION INTRAVENOUS at 08:56

## 2022-08-29 RX ADMIN — PROPOFOL 125 MCG/KG/MIN: 10 INJECTION, EMULSION INTRAVENOUS at 09:02

## 2022-08-29 RX ADMIN — ROCURONIUM BROMIDE 50 MG: 10 SOLUTION INTRAVENOUS at 09:36

## 2022-08-29 RX ADMIN — SODIUM CHLORIDE, SODIUM LACTATE, POTASSIUM CHLORIDE, AND CALCIUM CHLORIDE: .6; .31; .03; .02 INJECTION, SOLUTION INTRAVENOUS at 09:35

## 2022-08-29 RX ADMIN — ACETAMINOPHEN 1000 MG: 500 TABLET ORAL at 07:32

## 2022-08-29 RX ADMIN — HYDROMORPHONE HYDROCHLORIDE 0.5 MG: 1 INJECTION, SOLUTION INTRAMUSCULAR; INTRAVENOUS; SUBCUTANEOUS at 11:29

## 2022-08-29 RX ADMIN — HYDROMORPHONE HYDROCHLORIDE 0.5 MG: 2 INJECTION INTRAMUSCULAR; INTRAVENOUS; SUBCUTANEOUS at 10:52

## 2022-08-29 RX ADMIN — ONDANSETRON 4 MG: 2 INJECTION INTRAMUSCULAR; INTRAVENOUS at 13:59

## 2022-08-29 RX ADMIN — KETOROLAC TROMETHAMINE 15 MG: 30 INJECTION, SOLUTION INTRAMUSCULAR; INTRAVENOUS at 11:10

## 2022-08-29 RX ADMIN — SUGAMMADEX 200 MG: 100 INJECTION, SOLUTION INTRAVENOUS at 10:34

## 2022-08-29 RX ADMIN — MAGNESIUM SULFATE IN WATER 2000 MG: 40 INJECTION, SOLUTION INTRAVENOUS at 09:40

## 2022-08-29 ASSESSMENT — PAIN DESCRIPTION - DESCRIPTORS
DESCRIPTORS: ACHING
DESCRIPTORS: ACHING;CRAMPING

## 2022-08-29 ASSESSMENT — PAIN SCALES - GENERAL
PAINLEVEL_OUTOF10: 7
PAINLEVEL_OUTOF10: 8
PAINLEVEL_OUTOF10: 0
PAINLEVEL_OUTOF10: 3

## 2022-08-29 ASSESSMENT — PAIN DESCRIPTION - ORIENTATION: ORIENTATION: LEFT

## 2022-08-29 ASSESSMENT — PAIN - FUNCTIONAL ASSESSMENT: PAIN_FUNCTIONAL_ASSESSMENT: PREVENTS OR INTERFERES WITH MANY ACTIVE NOT PASSIVE ACTIVITIES

## 2022-08-29 ASSESSMENT — PAIN DESCRIPTION - LOCATION: LOCATION: KNEE

## 2022-08-29 NOTE — PROGRESS NOTES
Pt to [facility] and awaiting PT to evaluate for planned discharge home following. Pt VSS and no complaints at this time.

## 2022-08-29 NOTE — OP NOTE
Orthopaedic Surgery  Operative Report      Patient Name:  Oli Sun  Patient :  1955  MRN: [id number]    Date: 22     Pre-operative Diagnosis:   M17.12 Primary Osteoarthritis    Post-operative Diagnosis:    Same    Procedure: LEFT  77008 Total Knee Arthroplasty    Surgeon:  Francisca Xiong) and Role:     * Abel James MD - Primary    Assistant: Meeraulator: Carol Peguero RN  Surgical Assistant: Leyla Mahajan  First Assistant: Ford Anderson RN; PATRICIA Mendez  Scrub Person First: Obey Koenig    Anesthesia: General endotracheal anesthesia, Spinal anesthesia with MAC sedation, Intraoperative local infiltration - Ortho-cocktail mixture, Regional with adductor canal block, and Placement of indwelling catheter    Estimated blood loss: 150    Specimens: * No specimens in log *    Complications: None    Drains: None    Condition: Stable    Implants:   Implant Name Type Inv. Item Serial No.  Lot No. LRB No. Used Action   CEMENT BNE 40 GM RADIOPAQUE BA SIMPLEX P - [id number]  CEMENT BNE 40 GM RADIOPAQUE BA SIMPLEX P  [employer] [facility] [id number] Left 2 Implanted   COMPONENT PAT SZ 3 RESURF AUG GMK - [id number]  COMPONENT PAT SZ 3 RESURF AUG GMK  [employer] [facility] [id number] Left 1 Implanted   COMPONENT TIB SZ 3 LT FIX MELVI GMK - [id number]  COMPONENT TIB SZ 3 LT FIX MELVI GMK  [employer] [facility] [id number] Left 1 Implanted   COMPONENT FEM SZ 3+ LT MELVI GMK SPHR - [id number]  COMPONENT FEM SZ 3+ LT MELVI GMK SPHR  [employer] [facility] [id number] Left 1 Implanted   INSERT TIB SZ 3 [id number] LT FLX GMK SPHR - [id number]  INSERT TIB SZ 3 [id number] LT FLX GMK SPHR  [employer] [facility] [id number] Left 1 Implanted     Findings:   1. End stage OA  2. Varus gonarthrosis    Indications: The patient has been battling left knee pain for months to years. Pain has gotten worse recently. Patient has failed all preoperative conservative treatment options. The activities of daily living have been affected quite a bit.   Patient wanted to regain mobility and be active as possible. Patient understood the risk benefits and alternatives in detail and wanted to proceed with the above operation. Procedure Details:   I marked the surgical site of the left knee for surgery. He was taken back to the operating theater laid supine the table the bony prominences well-padded. General anesthesia was induced. We transferred the patient to the operating table supine. The leg was prepped and draped in standard sterile fashion. An appropriate leg positioner,Evans , was used to stabilize the extremity. Antibiotics 2 g of Ancef were given. MRSA swab testing was performed preop, and no additional antibiotics were required. Tranexamic acid 1 g was given at start. Tourniquet was only used through the cementing portion of the case. Overall time was 16 minutes. We began with an anterior approach to the knee. Thick soft tissue flaps were developed. Medial parapatellar arthrotomy was extended through the fascial tissue. Underlying synovial fluid was evacuated. Small synovectomy was performed just superior to the distal femoral cartilage. Part of the infrapatellar fat pad was removed for exposure. A controlled lateral release just lateral to the patella bone was created for exposure. Patella was then everted and the knee flexed. 2 Hohmann type retractors were then placed to deliver the distal femur. [address] [place] [address] Patient specific instrumentation:  Electro Bovie cautery was then used to remove any existing cartilage tissue away from the footprints of the 3 dimensionally printed guides. This custom cutting block was then opposed to the distal femur and fixed with 3 threaded pins. Another threaded pin was then used to jacqueline rotation through this guide. The resection levels were then double confirmed using jazmyne wing, both medial and lateral.  An oscillating saw blade was then used to create a distal femoral resection.   The resections were then measured and the appropriate thicknesses were then achieved. This recreated our planned resection level based on her patient specific CT-based guide preoperatively. The rotational pins were then inserted. 4-in-1 cutting block was then placed on the distal femoral bone. Appropriate rotation was then achieved. 2 screws then stabilized this block in addition. An oscillating saw blade then removed the posterior resections. Once again, these resections were measured to confirm appropriate restoration of restricted kinematic protocol, based on our preoperative CT based plan. The remaining 3 cuts were then made. The notch tissue was then cleaned out and the edges of the femoral resections were then cleaned using a rongeur and electrocautery Bovie. Attention was then turned toward tibial exposure. The same was performed for the tibia as the femur. Existing cartilage tissue was then removed using electrocautery away from the footprints. A custom cutting block was then opposed to the proximal tibia and fixed with 3 threaded pins. Overall alignment restoration was then confirmed using a drop mando. This was designed in a way to make this parallel to the tibial shaft axis as planned per resection angle varus valgus, and slope. The resection levels were then triple confirmed using jazmyne wing, both medial and lateral.  An oscillating saw blade was then used to create the proximal tibial resection. This resection was also measured and the appropriate thickness was also restored. Lamina spreaders were then used to deliver the meniscal tissue to separate the distal femur and the proximal tibia. Electro Bovie cautery was then used to remove the meniscal remnants. Curved osteotome removed any posterior osteophytes. Resections were then cleaned up and ready for trial.    Flexion and extension gap blocks were then assessed. I was happy with size 10 block in flexion and extension.     Appropriate trials were then inserted. 10 mm thickness CS-medial pivot poly was used for the trial.  I checked this in extension and there was no laxity present medial or lateral.  In mid flexion, varus valgus testing resulted in femoral rotation as ideally expected. A 90 degrees flexion, there was no anterior to posterior instability. The medial pivot served as the hinge through which the proximal tibia was rotating. There was no laxity present medially under stress, and a small bit of laxity present laterally under stress as expected to restore native kinematics. The patella was then exposed. 2 point-to-point tenaculums then delivered and stabilized the patella. Appropriate resection freehand was then conducted of the patella. Thickness was confirmed pre and post.  A minimum of 12-14 mm was retained of existing bone. Size 3 patella was then trialed. The knee was taken through range of motion with all trial components in. The patella tracked midline in the femoral trochlea. 2 lug holes were drilled. The femoral finishing guide was used to create trochlear tracking groove. All trial components were then removed. Third-generation cement technique was then employed. Tourniquet was then insufflated. The back surfaces of the implants were coated immediately after cement was mixed and ready. The cut bone surfaces were also washed and dried until cancellous bone was visualized. Appropriate cement was then placed at the proximal tibial surface. The tibial baseplate was then impacted first.  Excess cement was removed. The actual polyethylene liner was then inserted. The distal femur was then exposed. Again, cement was placed onto the distal femoral surface. The actual component was then impacted into position with no complications. Excess was again removed. The knee was then extended. Small bit of cement was used around the patella to secure the patellar button.   This was held in position for a minimum of 15 minutes and until the cement had hardened. Insertion of adductor canal regional catheter: While the cement was curing, I made a small 1 cm window within the synovium on the medial side of the distal femur. I used a tonsil to gain access to the adductor canal bluntly. I used a retrograde technique for passing the adductor canal catheter where I entered the skin medially behind the synovium. I was able to bring the needle directly through the hole within the synovium. I then inserted the catheter using this technique. I was able to use a pituitary rongeur to deliver the catheter proximally up the canal. I then used 2-0 Vicryl in a running manner to help reapproximate the synovial lining, such that the catheter was completely extra-articular behind the synovium. Tourniquet was then let down. Hemostasis was then achieved again. Excess cement was chipped away and removed to avoid impingement and free bodies. The stability and unrestricted motion of the knee was then confirmed once more. We performed sequential closure. I irrigated the wound thoroughly with 3 L normal saline. I placed 2 g of vancomycin powder around the wound. I also injected a mixture of Marcaine and Exparel into the perioperative field. Adequate hemostasis was achieved. #2 Ethibond approximated the capsular tissue. #2 Quill suture approximated the fascial layer and medial parapatellar arthrotomy. 2-0 Vicryl interrupted sutures closed the subcutaneous tissue layer. Monocryl subcuticular suture was then applied. Dermabond Prineo was then used with a nonadhesive dressing. Patient then was reversed in anesthesia, transferred back the postoperative care unit without any complications. All instrument counts were correct x2. I was present throughout the entire to the case with the exception of skin closure.     PLAN:  - WBAT with assist device  - aspirin 81 mg BID  - ambulate postop with PT  - resume home meds, diet  - f/u scheduled with me in 2-3 weeks  - dispo: plan for discharge, therapy in PACU    REHAB: Knee immobilizer only for 2 weeks until quadriceps muscle restores full strength and proprioception, only as prophylactic measure. Patient is encouraged to fully flex and extend his knee when sitting upright and when in bed.         Electronically signed by Marisol Rodriguez MD on 8/29/2022 at 10:54 AM

## 2022-08-29 NOTE — PROGRESS NOTES
Physical Therapy  Facility/Department: [facility] OR  Physical Therapy Initial Assessment/Discharge Summary (1x only)    Name: Medina Espinosa  : 1955  MRN: [id number]  Date of Service: 2022    Discharge Recommendations:  24 hour supervision or assist, Outpatient PT   PT Equipment Recommendations  Equipment Needed: Yes  Mobility Devices: Dasha Copping: Rolling      Patient Diagnosis(es): The encounter diagnosis was Localized osteoarthritis of left knee. Past Medical History:  has a past medical history of Hyperlipidemia, Menopause, Menopause, OA (osteoarthritis), and  (spontaneous vaginal delivery). Past Surgical History:  has a past surgical history that includes Colonoscopy (02/15/2001, 2006, 10/1/12) and [place] tooth extraction. Assessment   Body Structures, Functions, Activity Limitations Requiring Skilled Therapeutic Intervention: Decreased functional mobility ; Decreased ROM; Decreased strength; Increased pain  Assessment: Pt referred for PT evaluation during current hospital stay with dx of L knee OA, s/p scheduled L TKR surgery on 22. Pt currently functioning just slightly below her baseline post-surgery, requiring CGA/SBA x 1 for safe amb with support of RW. As session progressed, pt able to perform functional mobility at SBA/supervision level or better. Pt demonstrates minimal knee pain with activity and participates well in supine LE ther ex. L quad control appears excellent, with pt performing SLR x 10 reps with (I) and ambulating safely without use of KI. Recommend pt return home with initial 24-hr sup/assist from family for safety and OP PT. Also recommend RW for home use, as pt only has 4WW's available. No further acute PT needs at this time.   Treatment Diagnosis: Decreased ROM/strength L knee s/p L TKR on 22  Therapy Prognosis: Excellent  Decision Making: Low Complexity  Requires PT Follow-Up: No  No Skilled PT: Safe to return home  Activity Tolerance: Patient tolerated evaluation without incident;Patient tolerated treatment well     Plan   Plan: Discharge with evaluation only  Safety Devices: All fall risk precautions in place, Call light within reach, Gait belt, Nurse notified, Left in bed     Restrictions  Restrictions/Precautions  Restrictions/Precautions: Surgical Protocols, General Precautions, Weight Bearing  Required Braces or Orthoses?: Yes (KI to LLE when OOB until adequate quad control)  Lower Extremity Weight Bearing Restrictions  Left Lower Extremity Weight Bearing: Weight Bearing As Tolerated     Subjective   General  Chart Reviewed: Yes  Patient assessed for rehabilitation services?: Yes  Family / Caregiver Present: Yes ( and daughter)  Referring Practitioner: Dr. Timoteo Rodriguez and Brandie Paulino PA-C  Referral Date : 08/29/22  Diagnosis: L knee OA, s/p L TKR on 8/29/22  Follows Commands: Within Functional Limits  Subjective: Pt agreeable to work with PT this afternoon; evaluated in SDS unit. Pt reports she's feeling pretty well, just groggy from the anesthesia. Hopeful to be able to D/C home today. Pain: Pt c/o 4-5/10 pain in L knee at rest.  Reports knee pain did not change appreciably with amb.     Social/Functional History  Social/Functional History  Lives With: Spouse ()  Type of Home: House  Home Layout: Two level, Able to Live on Main level with bedroom/bathroom  Home Access: Level entry  Bathroom Shower/Tub: Walk-in shower, Doors  Bathroom Toilet: Standard  Bathroom Equipment: Shower chair, Hand-held shower  Home Equipment: [address], [address], 4 wheeled  Has the patient had two or more falls in the past year or any fall with injury in the past year?: No  ADL Assistance: Independent  Homemaking Assistance: Independent  Ambulation Assistance: Independent (typically without AD, using cane in community recently for safety due to knee issues)  Transfer Assistance: Independent  Active : Yes  Type of Occupation: Pt does not work outside the home  Leisure & Hobbies: Reading, water aerobics at the [facility], shopping    Vision/Hearing  Vision  Vision: Impaired  Vision Exceptions: Wears glasses at all times  Hearing  Hearing: Within functional limits      Cognition   Orientation  Overall Orientation Status: Within Normal Limits     Objective   Vitals (taken post-amb with pt in bed)  Heart Rate: 83  Heart Rate Source: Monitor  BP: 133/66  BP Location: Left upper arm  BP Method: Automatic  Patient Position: Semi fowlers (post-amb)  MAP (Calculated): 88.33  Resp: 15  SpO2: 95 %  O2 Device: None (Room air)    Gross Assessment  AROM: Generally decreased, functional  Strength: Generally decreased, functional  Coordination: Generally decreased, functional  Tone: Normal  Sensation: Impaired (slight N/T in LLE although sensation largely intact)     Bed Mobility Training  Rolling: Modified independent  Supine to Sit: Modified independent  Sit to Supine: Modified independent  Scooting: Modified independent    Balance  Sitting: Intact  Standing: Intact    Transfer Training  Interventions: Verbal cues; Safety awareness training  Sit to Stand: Stand-by assistance; Additional time  Stand to Sit: Stand-by assistance; Additional time  Bed to Chair: Stand-by assistance; Additional time  Toilet Transfer: Stand-by assistance; Additional time (pt able to use bathroom midway through session with SBA and use of RW; vomited while in bathroom with pt reporting relief afterwards - SDS RN aware)    Gait Training  Left Side Weight Bearing: As tolerated  Overall Level of Assistance: Contact-guard assistance;Stand-by assistance (CGA x 1 decreasing to SBA with repetition)  Interventions: Safety awareness training;Verbal cues  Speed/Donna: Pace decreased (< 100 feet/min)  Gait Abnormalities: Step to gait (pt amb with slow pace using step-to gait pattern, gait largely WFL, no L knee instability noted during L stance phase)  Distance (ft): 50 Feet (2 x 50 feet)  Assistive Device: Arbutus Showman, rolling    Stair Training  Stairs - Level of Assistance: Other (comment) (no stairs attempted; pt has 0 [address] home and will be staying on 1st floor at first with access to bedroom/full bathroom)    Exercises  Ankle pumps: x 15 BLE  Glut sets & quad sets: x 10 BLE each  Short arc quads: x 10 LLE (I)  SLR: x 10 LLE (I)  Seated heel slides at EOB: Deferred 2* vomiting while using bathroom; reviewed with pt verbally; pt verbalizes understanding    AM-PAC Score  AM-PAC Inpatient Mobility Raw Score : 21 (08/29/22 1649)  AM-PAC Inpatient T-Scale Score : 50.25 (08/29/22 1649)  Mobility Inpatient CMS 0-100% Score: 28.97 (08/29/22 1649)  Mobility Inpatient CMS G-Code Modifier : CJ (08/29/22 1649)    Goals  Short Term Goals  Time Frame for Short term goals: 1 visit - 8/29/22  Short term goal 1: Pt will transfer supine <-> sit with modified(I) - MET 8/29/22  Short term goal 2: Pt will transfer sit <-> stand and bed>chair using RW with SBA - MET 8/29/22  Short term goal 3: Pt will ambulate x 50 feet using RW with SBA - MET 8/29/22  Short term goal 4: Pt will verbalize/demonstrate understanding of HEP for L knee ROM/strengthening - MET 8/29/22  Short term goal 5: Pt will verbalize understanding of car transfer technique s/p TKR - MET 8/29/22  Patient Goals   Patient goals : \"To be able to walk around my house (distances of at least 25 feet) using my walker without help from my family (SBA or less)\" by 8/29/22 - MET 8/29/22       Education  Patient Education  Education Given To: Patient; Family (pt, , dtr)  Education Provided: Role of Therapy;Plan of Care;Home Exercise Program;Precautions;Transfer Training;Family Education;Equipment; Fall Prevention Strategies  Education Provided Comments: Pt educated on post-TKR HEP, car transfer technique, basic technique for LE dressing s/p TKR - pt and family verbalize understanding.   Education Method: Demonstration;Verbal;Printed Information/Hand-outs (HEP issued)  Barriers to Learning: None  Education Outcome: Verbalized understanding;Demonstrated understanding    Patient Educated in safety with car transfers and  dispensed instruction on car transfers with use of assistive device with patient demonstrating:  [x] Verbalizing understanding of appropriate technique, maintaining any ordered precautions for car transfer training s/p TJR  [] [place] with simulated car transfer with walker  [x] He/she requires contact-guard/standby assist and will have someone at discharge to help with transfers (spouse) with patient verbalizing understanding of any ordered TJR precautions employing appropriate technique.     Therapy Time   Individual Concurrent Group Co-treatment   Time In 8325         Time Out 1610         Minutes 55         Timed Code Treatment Minutes: Roosevelt Melvin [place], [place] #[id number]

## 2022-08-29 NOTE — PROGRESS NOTES
IS education completed, please doc flow sheet for details. Left knee wiped with CHG wipes.  Warming devices in place  Blue Vasquez RN

## 2022-08-29 NOTE — ANESTHESIA PRE PROCEDURE
Department of Anesthesiology  Preprocedure Note       Name:  Eloisa Rose   Age:  78 y.o.  :  1955                                          MRN:  [id number]         Date:  2022      Surgeon: Tania Donovan): Elif Mendieta MD    Procedure: Procedure(s):  LEFT TOTAL KNEE ARTHROPLASTY WITH ADDUCTOR CANAL BLOCK FOR PAIN CONTROL                  [facility]    Medications prior to admission:   Prior to Admission medications    Medication Sig Start Date End Date Taking? Authorizing Provider   mupirocin (BACTROBAN) 2 % ointment Apply twice daily to each nare for 5 days prior to surgical procedure 22   Elif Mendieta MD   Red Yeast Rice Extract (RED YEAST RICE PO) Take by mouth    Historical Provider, MD   diclofenac (VOLTAREN) 50 MG EC tablet Take 1 tablet by mouth 2 times daily (with meals) 21   Naeem Lloyd MD   vitamin D (CHOLECALCIFEROL) 400 UNITS TABS tablet Take 1,000 Units by mouth daily     Historical Provider, MD   Multiple Vitamin (MULTI-VITAMIN PO) Take  by mouth.     Historical Provider, MD       Current medications:    Current Facility-Administered Medications   Medication Dose Route Frequency Provider Last Rate Last Admin    sodium chloride flush 0.9 % injection 5-40 mL  5-40 mL IntraVENous 2 times per day Ravi Stands, PA-ELIF        sodium chloride flush 0.9 % injection 5-40 mL  5-40 mL IntraVENous PRN Ravi Stands PA-ELIF        0.9 % sodium chloride infusion   IntraVENous PRN Ravi StandsVISHAL        ceFAZolin (ANCEF) 2000 mg in dextrose 5 % 100 mL IVPB  2,000 mg IntraVENous On Call to [employer]        lidocaine PF 1 % injection 1 mL  1 mL IntraDERmal Once PRN Chhaya Quinonez MD        lactated ringers infusion   IntraVENous Continuous Chhaya PastMD albert        sodium chloride flush 0.9 % injection 5-40 mL  5-40 mL IntraVENous 2 times per day Chhaya Quinonez MD        sodium chloride flush 0.9 % injection 5-40 mL  5-40 mL IntraVENous PRSHRAVAN Forbes MD        0.9 % sodium chloride infusion   IntraVENous PRN Cristal Forbes MD        magnesium sulfate 2000 mg in 50 mL IVPB premix  2,000 mg IntraVENous Once Cristal Forbes MD        sodium chloride 0.9 % 30 mL with bupivacaine-EPINEPHrine (MARCAINE-w/EPINEPHRINE) 0.25% -[id number] 30 mL, cloNIDine 100 mcg, dexamethasone (DECADRON) 4 mg, ketorolac (TORADOL) 30 mg, ropivacaine (NAROPIN) 30 mL   Injection Once Armani Fishman MD           Allergies: Allergies   Allergen Reactions    Penicillins Rash     ITCHING       Problem List:    Patient Active Problem List   Diagnosis Code    Pure hypercholesterolemia E78.00    Vitamin D deficiency E55.9    Primary osteoarthritis of both knees M17.0    Achilles tendon tear, right, sequela S86.011S    Localized osteoarthritis of left knee M17.12       Past Medical History:        Diagnosis Date    Hyperlipidemia     Menopause     Menopause     OA (osteoarthritis)      (spontaneous vaginal delivery)            Past Surgical History:        Procedure Laterality Date    COLONOSCOPY  02/15/2001, 2006, 10/1/12    WISDOM TOOTH EXTRACTION         Social History:    Social History     Tobacco Use    Smoking status: Never    Smokeless tobacco: Never   Substance Use Topics    Alcohol use:  No                                Counseling given: Not Answered      Vital Signs (Current):   Vitals:    22 1221 22 0712   BP:  (!) 150/79   Pulse:  86   Resp:  16   Temp:  97 °F (36.1 °C)   TempSrc:  Temporal   SpO2:  99%   Weight: 173 lb (78.5 kg) 175 lb 9.6 oz (79.7 kg)   Height:  5' 4\" (1.626 m)                                              BP Readings from Last 3 Encounters:   22 (!) 150/79   22 118/78   10/21/21 (!) 157/86       NPO Status: Time of last liquid consumption: 0700                        Time of last solid consumption: 1900                        Date of last liquid consumption: 08/29/22                        Date of last solid food consumption: 08/28/22    BMI:   Wt Readings from Last 3 Encounters:   08/29/22 175 lb 9.6 oz (79.7 kg)   08/02/22 173 lb (78.5 kg)   07/14/22 174 lb (78.9 kg)     Body mass index is 30.14 kg/m². CBC:   Lab Results   Component Value Date/Time    WBC 5.9 07/18/2022 12:50 PM    RBC 4.24 07/18/2022 12:50 PM    HGB 12.6 07/18/2022 12:50 PM    HCT 37.8 07/18/2022 12:50 PM    MCV 89.3 07/18/2022 12:50 PM    RDW 14.4 07/18/2022 12:50 PM     07/18/2022 12:50 PM       CMP:   Lab Results   Component Value Date/Time     07/18/2022 12:50 PM    K 4.4 07/18/2022 12:50 PM     07/18/2022 12:50 PM    CO2 25 07/18/2022 12:50 PM    BUN 14 07/18/2022 12:50 PM    CREATININE 0.6 07/18/2022 12:50 PM    GFRAA >60 07/18/2022 12:50 PM    GFRAA >60 10/19/2012 10:02 AM    AGRATIO 2.1 07/31/2018 10:11 AM    LABGLOM >60 07/18/2022 12:50 PM    GLUCOSE 83 07/18/2022 12:50 PM    PROT 7.2 07/31/2018 10:11 AM    PROT 7.1 10/19/2012 10:02 AM    CALCIUM 9.9 07/18/2022 12:50 PM    BILITOT 0.5 07/31/2018 10:11 AM    ALKPHOS 81 07/31/2018 10:11 AM    AST 13 07/31/2018 10:11 AM    ALT 12 07/31/2018 10:11 AM       POC Tests: No results for input(s): POCGLU, POCNA, POCK, POCCL, POCBUN, POCHEMO, POCHCT in the last 72 hours.     Coags:   Lab Results   Component Value Date/Time    PROTIME 13.0 07/18/2022 12:50 PM    INR 1.00 07/18/2022 12:50 PM    APTT 34.8 07/18/2022 12:50 PM       HCG (If Applicable): No results found for: PREGTESTUR, PREGSERUM, HCG, HCGQUANT     ABGs: No results found for: PHART, PO2ART, TPG7VEV, OSY8XSX, BEART, G6KSZXSL     Type & Screen (If Applicable):  No results found for: LABABO, LABRH    Drug/Infectious Status (If Applicable):  No results found for: HIV, HEPCAB    COVID-19 Screening (If Applicable): No results found for: COVID19        Anesthesia Evaluation  Patient summary reviewed and Nursing notes reviewed no history of anesthetic complications:   Airway: Mallampati: II     Neck ROM: full     Dental:          Pulmonary:Negative Pulmonary ROS and normal exam                               Cardiovascular:Negative CV ROS    (+) hyperlipidemia                  Neuro/Psych:   Negative Neuro/Psych ROS              GI/Hepatic/Renal: Neg GI/Hepatic/Renal ROS       (-) hiatal hernia and GERD       Endo/Other: Negative Endo/Other ROS                    Abdominal:             Vascular: Other Findings:           Anesthesia Plan      general     ASA 2     (I discussed with the patient the risks and benefits of regional anesthesia (inlcuding infection, bleeding, damage to nerves and surrounding structures)  PIV, ACNB, SAB, General anesthesia, IV Narcotics, PACU. All questions were answered the patient agrees with the plan and wishes to proceed.)  Induction: intravenous. Pre-Operative Diagnosis: Primary osteoarthritis of left knee [M17.12]; Localized osteoarthritis of left knee [M17.12]    78 y.o.   BMI:  Body mass index is 30.14 kg/m².      Vitals:    08/23/22 1221 08/29/22 0712   BP:  (!) 150/79   Pulse:  86   Resp:  16   Temp:  97 °F (36.1 °C)   TempSrc:  Temporal   SpO2:  99%   Weight: 173 lb (78.5 kg) 175 lb 9.6 oz (79.7 kg)   Height:  5' 4\" (1.626 m)       Allergies   Allergen Reactions    Penicillins Rash     ITCHING       Social History     Tobacco Use    Smoking status: Never    Smokeless tobacco: Never   Substance Use Topics    Alcohol use: No       LABS:    CBC  Lab Results   Component Value Date/Time    WBC 5.9 07/18/2022 12:50 PM    HGB 12.6 07/18/2022 12:50 PM    HCT 37.8 07/18/2022 12:50 PM     07/18/2022 12:50 PM     RENAL  Lab Results   Component Value Date/Time     07/18/2022 12:50 PM    K 4.4 07/18/2022 12:50 PM     07/18/2022 12:50 PM    CO2 25 07/18/2022 12:50 PM    BUN 14 07/18/2022 12:50 PM    CREATININE 0.6 07/18/2022 12:50 PM    GLUCOSE 83 07/18/2022 12:50 PM     COAGS  Lab Results   Component Value Date/Time    PROTIME 13.0 07/18/2022 12:50 PM    INR 1.00 07/18/2022 12:50 PM    APTT 34.8 (H) 07/18/2022 12:50 PM         David Terry MD   8/29/2022 negative

## 2022-08-29 NOTE — H&P
Update History & Physical     The patient's History and Physical of 8/2/2022 was reviewed with the patient and I examined the patient. There was no change. The surgical site was confirmed by the patient and me. Plan: The risks, benefits, expected outcome, and alternative to the recommended procedure have been discussed with the patient / family. Patient understands and wants to proceed with the procedure.       Electronically signed by Halina Hodges MD on 8/29/2022 at 7:17 AM

## 2022-08-29 NOTE — ANESTHESIA POSTPROCEDURE EVALUATION
Department of Anesthesiology  Postprocedure Note    Patient: Keyshawn Marroquin  MRN: [id number]  YOB: 1955  Date of evaluation: 8/29/2022      Procedure Summary     Date: 08/29/22 Room / Location: [address] [place] [address]  / [facility]    Anesthesia Start: 2977 Anesthesia Stop: 1024    Procedure: LEFT TOTAL KNEE ARTHROPLASTY WITH ADDUCTOR CANAL BLOCK FOR PAIN CONTROL                  [facility] (Left: Knee) Diagnosis:       Primary osteoarthritis of left knee      (LEFT KNEE PRIMARY OSTEOARTHRITIS)    Surgeons: Gracy Javier MD Responsible Provider: Janneth Higgins MD    Anesthesia Type: general ASA Status: 2          Anesthesia Type: No value filed.     Tisha Phase I: Tisha Score: 9    Tisha Phase II: Tisha Score: 10      Anesthesia Post Evaluation    Comments: Postoperative Anesthesia Note    Name:    Keyshawn Marroquin  MRN:      [id number]    Patient Vitals in the past 12 hrs:  08/29/22 1609, BP:133/66, Pulse:83, SpO2:95 %  08/29/22 1420, Pulse:77, SpO2:98 %  08/29/22 1419, Pulse:74, SpO2:91 %  08/29/22 1418, Pulse:74, SpO2:98 %  08/29/22 1417, Pulse:75, SpO2:96 %  08/29/22 1416, BP:137/73, Pulse:76, SpO2:96 %  08/29/22 1415, Pulse:78, SpO2:97 %  08/29/22 1414, Pulse:77, SpO2:93 %  08/29/22 1413, Pulse:73, SpO2:95 %  08/29/22 1412, BP:(!) 149/69, Pulse:77, SpO2:98 %  08/29/22 1411, Pulse:79, SpO2:98 %  08/29/22 1410, Pulse:78, SpO2:99 %  08/29/22 1409, Pulse:82, SpO2:99 %  08/29/22 1408, Pulse:77, SpO2:100 %  08/29/22 1407, Pulse:80, SpO2:94 %  08/29/22 1406, BP:132/78, Pulse:78, SpO2:(!) 89 %  08/29/22 1405, Pulse:84, SpO2:97 %  08/29/22 1404, Pulse:84, SpO2:96 %  08/29/22 1403, Pulse:77, SpO2:91 %  08/29/22 1402, BP:138/67, Pulse:81, SpO2:91 %  08/29/22 1401, Pulse:83, SpO2:91 %  08/29/22 1400, SpO2:97 %  08/29/22 1359, SpO2:97 %  08/29/22 1358, SpO2:97 %  08/29/22 1357, BP:(!) 141/70, SpO2:95 %  08/29/22 1356, Pulse:(!) 0, SpO2:98 %  08/29/22 1355, Pulse:(!) 0, SpO2:100 %  08/29/22 1354, Pulse:74, SpO2:(!) 74 %  08/29/22 1353, Pulse:70, SpO2:94 %  08/29/22 1352, Pulse:73, SpO2:98 %  08/29/22 1351, Pulse:77, SpO2:(!) 87 %  08/29/22 1350, Pulse:76, SpO2:(!) 86 %  08/29/22 1349, Pulse:68, SpO2:97 %  08/29/22 1348, Pulse:77, SpO2:99 %  08/29/22 1347, BP:135/67, Pulse:69, SpO2:(!) 87 %  08/29/22 1346, Pulse:69, SpO2:96 %  08/29/22 1345, Pulse:80, SpO2:98 %  08/29/22 1344, Pulse:79, SpO2:(!) 88 %  08/29/22 1343, Pulse:71, SpO2:90 %  08/29/22 1342, BP:131/66, Pulse:66, SpO2:90 %  08/29/22 1341, Pulse:67, SpO2:94 %  08/29/22 1340, Pulse:82, SpO2:90 %  08/29/22 1339, Pulse:63, SpO2:93 %  08/29/22 1338, Pulse:82, SpO2:96 %  08/29/22 1337, BP:(!) 140/73, Pulse:79, SpO2:94 %  08/29/22 1336, Pulse:83, SpO2:95 %  08/29/22 1335, Pulse:91, SpO2:96 %  08/29/22 1334, Pulse:73, SpO2:99 %  08/29/22 1333, Pulse:79, SpO2:(!) 87 %  08/29/22 1332, BP:(!) 145/69, Pulse:72, SpO2:92 %  08/29/22 1331, Pulse:75, SpO2:91 %  08/29/22 1330, Pulse:71, SpO2:97 %  08/29/22 1329, Pulse:77, SpO2:(!) 87 %  08/29/22 1328, Pulse:75, SpO2:(!) 89 %  08/29/22 1327, BP:(!) 146/74, Pulse:68, SpO2:91 %  08/29/22 1326, Pulse:77, SpO2:91 %  08/29/22 1325, Pulse:74, SpO2:93 %  08/29/22 1324, Pulse:67, SpO2:95 %  08/29/22 1323, Pulse:83, SpO2:95 %  08/29/22 1322, BP:(!) 145/67, Pulse:71, SpO2:95 %  08/29/22 1321, Pulse:75, SpO2:94 %  08/29/22 1320, BP:(!) 146/75, Pulse:78, Resp:15, SpO2:97 %  08/29/22 1315, BP:(!) 145/75, Pulse:75, Resp:16, SpO2:99 %  08/29/22 1310, BP:(!) 149/70, Pulse:72, Resp:14, SpO2:96 %  08/29/22 1305, BP:(!) 148/76, Pulse:78, Resp:15, SpO2:100 %  08/29/22 1300, BP:139/64, Pulse:67, Resp:16, SpO2:99 %  08/29/22 1259, Pulse:74, SpO2:99 %  08/29/22 1258, Pulse:74, SpO2:97 %  08/29/22 1257, Pulse:70, SpO2:97 %  08/29/22 1256, BP:133/63, Pulse:76, SpO2:97 %  08/29/22 1255, Pulse:81, SpO2:95 %  08/29/22 1254, Pulse:70, SpO2:98 %  08/29/22 1253, Pulse:78, SpO2:93 %  08/29/22 1252, BP:131/68, Pulse:78, SpO2:96 %  08/29/22 1251, Pulse:67, SpO2:92 %  08/29/22 1250, Pulse:72, SpO2:95 %  08/29/22 1249, Pulse:75, SpO2:93 %  08/29/22 1248, Pulse:67, SpO2:96 %  08/29/22 1247, BP:(!) 147/76, Pulse:81, SpO2:95 %  08/29/22 1246, Pulse:84, SpO2:96 %  08/29/22 1245, Pulse:76, SpO2:97 %  08/29/22 1244, Pulse:84, SpO2:98 %  08/29/22 1243, Pulse:86, SpO2:97 %  08/29/22 1242, BP:(!) 141/67, Pulse:67, SpO2:96 %  08/29/22 1241, Pulse:77, SpO2:96 %  08/29/22 1240, Pulse:76, SpO2:97 %  08/29/22 1239, Pulse:65, SpO2:97 %  08/29/22 1238, Pulse:83, SpO2:97 %  08/29/22 1237, Pulse:69, SpO2:97 %  08/29/22 1236, Pulse:74, SpO2:96 %  08/29/22 1235, Pulse:65, SpO2:97 %  08/29/22 1234, Pulse:76, SpO2:98 %  08/29/22 1233, Pulse:66, SpO2:97 %  08/29/22 1232, Pulse:74, SpO2:95 %  08/29/22 1231, BP:(!) 144/82, Pulse:67, SpO2:96 %  08/29/22 1230, Pulse:67, SpO2:94 %  08/29/22 1229, Pulse:70, SpO2:95 %  08/29/22 1228, Pulse:74, SpO2:93 %  08/29/22 1227, BP:(!) 146/74, Pulse:69, SpO2:95 %  08/29/22 1226, Pulse:67, SpO2:96 %  08/29/22 1225, Pulse:68, SpO2:96 %  08/29/22 1224, Pulse:80, SpO2:95 %  08/29/22 1223, Pulse:78, SpO2:90 %  08/29/22 1222, BP:(!) 152/75, Pulse:64, SpO2:97 %  08/29/22 1221, Pulse:79, SpO2:98 %  08/29/22 1220, Pulse:79, SpO2:(!) 85 %  08/29/22 1219, Pulse:74, SpO2:(!) 88 %  08/29/22 1218, Pulse:85, SpO2:(!) 85 %  08/29/22 1217, Pulse:76, SpO2:90 %  08/29/22 1216, BP:(!) 145/78, Pulse:75, SpO2:97 %  08/29/22 1215, Pulse:75, SpO2:(!) 88 %  08/29/22 1214, Pulse:73, SpO2:91 %  08/29/22 1213, Pulse:68, SpO2:93 %  08/29/22 1212, BP:(!) 141/76, Pulse:66, SpO2:96 %  08/29/22 1211, BP:(!) 141/76, Pulse:72, SpO2:96 %  08/29/22 1210, Pulse:66, SpO2:96 %  08/29/22 1209, Pulse:76, SpO2:98 %  08/29/22 1208, Pulse:76, SpO2:97 %  08/29/22 1207, Pulse:74, SpO2:98 %  08/29/22 1206, BP:138/68, Pulse:64, SpO2:98 %  08/29/22 1205, Pulse:68, SpO2:97 %  08/29/22 1204, Pulse:65, SpO2:97 %  08/29/22 1203, Pulse:73, SpO2:100 %  08/29/22 1202, Pulse:67, SpO2:99 %  08/29/22 1201, BP:(!) 146/80, Pulse:69, SpO2:100 %  08/29/22 1200, Pulse:90, SpO2:(!) 85 %  08/29/22 1159, Pulse:69, SpO2:(!) 84 %  08/29/22 1158, Pulse:66, SpO2:98 %  08/29/22 1157, Pulse:76, SpO2:95 %  08/29/22 1156, BP:(!) 150/68, Pulse:66, SpO2:95 %  08/29/22 1155, Pulse:78, SpO2:98 %  08/29/22 1154, Pulse:73, SpO2:99 %  08/29/22 1153, Pulse:79, SpO2:99 %  08/29/22 1152, Pulse:69, SpO2:99 %  08/29/22 1151, BP:(!) 143/59, Pulse:77, SpO2:99 %  08/29/22 1150, Pulse:74, SpO2:98 %  08/29/22 1149, Pulse:70, SpO2:99 %  08/29/22 1148, Pulse:80, SpO2:(!) 88 %  08/29/22 1147, BP:(!) 151/67, Pulse:79, SpO2:(!) 89 %  08/29/22 1146, Pulse:79, SpO2:91 %  08/29/22 1145, Pulse:80, SpO2:96 %  08/29/22 1144, Pulse:84, SpO2:(!) 79 %  08/29/22 1143, Pulse:82, SpO2:(!) 81 %  08/29/22 1142, BP:(!) 160/97, Pulse:76, SpO2:96 %  08/29/22 1141, Pulse:83, SpO2:92 %  08/29/22 1140, Pulse:75, SpO2:97 %  08/29/22 1139, Pulse:86, SpO2:(!) 89 %  08/29/22 1138, Pulse:78, SpO2:92 %  08/29/22 1137, BP:(!) 163/72, Pulse:77, SpO2:95 %  08/29/22 1136, Pulse:81, SpO2:96 %  08/29/22 1135, Pulse:83, SpO2:93 %  08/29/22 1134, Pulse:85, SpO2:94 %  08/29/22 1133, Pulse:80, SpO2:95 %  08/29/22 1132, BP:(!) 162/76, Pulse:83, SpO2:92 %  08/29/22 1131, Pulse:78, SpO2:92 %  08/29/22 1130, Pulse:86, SpO2:96 %  08/29/22 1129, Pulse:83, SpO2:96 %  08/29/22 1128, Pulse:86, SpO2:96 %  08/29/22 1127, Pulse:86, SpO2:95 %  08/29/22 1126, BP:(!) 168/82, Temp:97.2 °F (36.2 °C), Temp src:Temporal, Pulse:79, Resp:16, SpO2:92 %  08/29/22 0712, BP:(!) 150/79, Temp:97 °F (36.1 °C), Temp src:Temporal, Pulse:86, Resp:16, SpO2:99 %, Height:5' 4\" (1.626 m), Weight:175 lb 9.6 oz (79.7 kg)     LABS:    CBC  Lab Results       Component                Value               Date/Time                  WBC                      5.9                 07/18/2022 12:50 PM        HGB                      12.6                07/18/2022 12:50 PM        HCT                      37.8                07/18/2022 12:50 PM PLT                      289                 07/18/2022 12:50 PM   RENAL  Lab Results       Component                Value               Date/Time                  NA                       140                 07/18/2022 12:50 PM        K                        4.4                 07/18/2022 12:50 PM        CL                       100                 07/18/2022 12:50 PM        CO2                      25                  07/18/2022 12:50 PM        BUN                      14                  07/18/2022 12:50 PM        CREATININE               0.6                 07/18/2022 12:50 PM        GLUCOSE                  83                  07/18/2022 12:50 PM   COAGS  Lab Results       Component                Value               Date/Time                  PROTIME                  13.0                07/18/2022 12:50 PM        INR                      1.00                07/18/2022 12:50 PM        APTT                     34.8 (H)            07/18/2022 12:50 PM     Intake & Output:  @[id number]@    Nausea & Vomiting:  No    Level of Consciousness:  Awake    Pain Assessment:  Adequate analgesia    Anesthesia Complications:  No apparent anesthetic complications    SUMMARY      Vital signs stable  OK to discharge from Stage I post anesthesia care.   Care transferred from Anesthesiology department on discharge from perioperative area

## 2022-08-29 NOTE — ANESTHESIA PROCEDURE NOTES
Spinal Block    Patient location during procedure: OR  End time: 8/29/2022 9:02 AM  Reason for block: primary anesthetic and at surgeon's request  Staffing  Performed: resident/CRNA   Anesthesiologist: Kezia Dominguez MD  Resident/CRNA: ADITI Corado CRNA  Spinal Block  Patient position: sitting  Prep: Betadine  Patient monitoring: continuous pulse ox and frequent blood pressure checks  Approach: midline  Location: L3/L4  Provider prep: mask and sterile gloves  Local infiltration: lidocaine  Needle  Needle type:  Raymond   Needle gauge: 25 G  Needle length: 3.5 in  Assessment  Swirl obtained: Yes  CSF: clear  Attempts: 2  Hemodynamics: stable  Preanesthetic Checklist  Completed: patient identified, IV checked, site marked, risks and benefits discussed, surgical/procedural consents, equipment checked, pre-op evaluation, timeout performed, anesthesia consent given, oxygen available, monitors applied/VS acknowledged, fire risk safety assessment completed and verbalized and blood product R/B/A discussed and consented

## 2022-08-31 ENCOUNTER — CARE COORDINATION (OUTPATIENT)
Dept: CASE MANAGEMENT | Age: 67
End: 2022-08-31

## 2022-08-31 ENCOUNTER — HOSPITAL ENCOUNTER (OUTPATIENT)
Dept: PHYSICAL THERAPY | Age: 67
Setting detail: THERAPIES SERIES
Discharge: HOME | End: 2022-08-31
Payer: MEDICARE

## 2022-08-31 PROCEDURE — 97110 THERAPEUTIC EXERCISES: CPT | Performed by: PHYSICAL THERAPIST

## 2022-08-31 PROCEDURE — 97161 PT EVAL LOW COMPLEX 20 MIN: CPT | Performed by: PHYSICAL THERAPIST

## 2022-08-31 PROCEDURE — 97016 VASOPNEUMATIC DEVICE THERAPY: CPT | Performed by: PHYSICAL THERAPIST

## 2022-08-31 NOTE — PLAN OF CARE
.    [facility], [address]  Phone: [phone]  Fax [phone]     Ceasar Handler    Dear Dr. Angella Chawla ,    We had the pleasure of evaluating the following patient for physical therapy services at [address] [place] [address]. A summary of our findings can be found in the initial assessment below. This includes our plan of care. If you have any questions or concerns regarding these findings, please do not hesitate to contact me at the office phone number checked above. Thank you for the referral.       Physician Signature:_______________________________Date:__________________  By signing above (or electronic signature), therapists plan is approved by physician    Patient: Medina Espinosa   : 1955   MRN: [id number]    Referring Physician: Ronel Saavedra MD         Evaluation Date: 2022        Date of Referral:22    Insurance: Medicare    [facility] MD appt: scheduled:9/15/22    Medical Diagnosis:  I09.809 (ICD-10-CM) - S/P total knee arthroplasty, left      Treatment Diagnosis:  M25.662 L knee stiffness; M25.462 L knee effusion;  M25.562 L knee pain; difficulty walking R26.2  S/P L knee TKA 22        Precautions/ Contra-indications:   Per pt: pain pump to stay in for 4 days then pull. Immobilizer on for walking and sleeping currently. C-SSRS Triggered by Intake questionnaire (Past 2 wk assessment): Intake 0/4 subscale  [x] No, Questionnaire did not trigger screening.   [] Yes, Patient intake triggered further evaluation      [] C-SSRS Screening completed  [] PCP notified via Plan of Care  [] Emergency services notified     Latex Allergy/pacemaker/adhesive allergy:  [x]NO      []YES  Preferred Language for Healthcare:   [x]English       []other:    SUBJECTIVE: Patient stated complaint: Pt repots that the pain pump, ice and pain med are keeping her pain under control. She is doing ankle pumps, leg lifts, and rocking her chair back and forth. Relevant Medical History:OA    Functional Disability Index:FOTO 18/100     Height:64\" Weight:173lbs  Pain Scale: 4-8/10/10  Easing factors: meds, ice, pain pump  Provocative factors: exercises     Type: [x]Constant   []Intermittent  []Radiating []Localized []other:     Numbness/Tingling: lateral knee    Occupation/School: none    Living Status/Prior Level of Function: Independent with ADLs and IADLs, pt likes to do water aerobics. She lives in a home with her , 2 steps to get in. Bed is on the main floor however 2 story home. OBJECTIVE:     ROM LEFT RIGHT   HIP Flex     HIP Abd     HIP Ext     HIP IR     HIP ER     Knee ext QS/GS -5    Knee Flex seated heel slide flexion 45    Ankle PF     Ankle DF     Ankle In     Ankle Ev     Strength  LEFT RIGHT   HIP Flexors     HIP Abductors     HIP Ext     Hip ER     Knee EXT (quad)     Knee Flex (HS)     Ankle DF     Ankle PF     Ankle Inv     Ankle EV     Quad tone trace    Circumference  IP  MP  SP   CM  46  50  54          Reflexes/Sensation:  NT this visit   []Dermatomes/Myotomes intact    []Reflexes equal and normal bilaterally   []Other:    Joint mobility: KE/KF   []Normal    [x]Hypo   []Hyper    Palpation: warm to touch, palpable edema. No pitting in LE    Functional Mobility/Transfers:  difficulty with transfers at this time due to poin and brace    Posture: WNL    Bandages/Dressings/Incisions: slight dried drainage superior aspect of bandage. Will be changed tomorrow. Gait: (include devices/WB status) Pt ambulating slowly and safely w standard walker and immobilizer    Orthopedic Special Tests: Homans (-) forced DF (-)                       [x] Patient history, allergies, meds reviewed. Medical chart reviewed. See intake form. Review Of Systems (ROS):  [x]Performed Review of systems (Integumentary, CardioPulmonary, Neurological) by intake and observation.  Intake form has been scanned into medical record. Patient has been instructed to contact their primary care physician regarding ROS issues if not already being addressed at this time. Co-morbidities/Complexities (which will affect course of rehabilitation):   []None           Arthritic conditions   []Rheumatoid arthritis (M05.9)  [x]Osteoarthritis (M19.91)   Cardiovascular conditions   []Hypertension (I10)  []Hyperlipidemia (E78.5)  []Angina pectoris (I20)  []Atherosclerosis (I70)   Musculoskeletal conditions   []Disc pathology   []Congenital spine pathologies   []Prior surgical intervention  []Osteoporosis (M81.8)  []Osteopenia (M85.8)   Endocrine conditions   []Hypothyroid (E03.9)  []Hyperthyroid Gastrointestinal conditions   []Constipation (H97.59)   Metabolic conditions   []Morbid obesity (E66.01)  []Diabetes type 1(E10.65) or 2 (E11.65)   []Neuropathy (G60.9)     Pulmonary conditions   []Asthma (J45)  []Coughing   []COPD (J44.9)   Psychological Disorders  []Anxiety (F41.9)  []Depression (F32.9)   []Other:   []Other:          Barriers to/and or personal factors that will affect rehab potential:              []Age  []Sex              []Motivation/Lack of Motivation                        []Co-Morbidities              []Cognitive Function, education/learning barriers              []Environmental, home barriers              []profession/work barriers  []past PT/medical experience  []other:  Justification: Pt has no barriers to rehab progression and should do well with the established plan of care. Falls Risk Assessment (30 days):   [] Falls Risk assessed and no intervention required.   [x] Falls Risk assessed and Patient requires intervention due to being higher risk   TUG score (>12s at risk):     [x] Falls education provided, including walker safety          ASSESSMENT:   Functional Impairments:     [x]Noted lumbar/proximal hip/LE joint hypomobility   [x]Decreased LE functional ROM   [x]Decreased core/proximal hip strength and neuromuscular control   [x]Decreased LE functional strength   [x]Reduced balance/proprioceptive control   []other:      Functional Activity Limitations (from functional questionnaire and intake)   [x]Reduced ability to tolerate prolonged functional positions   [x]Reduced ability or difficulty with changes of positions or transfers between positions   [x]Reduced ability to maintain good posture and demonstrate good body mechanics with sitting, bending, and lifting   [x]Reduced ability to sleep   [x] Reduced ability or tolerance with driving and/or computer work   [x]Reduced ability to perform lifting, carrying tasks   [x]Reduced ability to squat   [x]Reduced ability to forward bend   [x]Reduced ability to ambulate prolonged functional periods/distances/surfaces   [x]Reduced ability to ascend/descend stairs   [x]Reduced ability to run, hop, cut or jump   []other:    Participation Restrictions   [x]Reduced participation in self care activities   [x]Reduced participation in home management activities   [x]Reduced participation in work activities   [x]Reduced participation in social activities. [x]Reduced participation in sport/recreation activities. Classification :    [x]Signs/symptoms consistent with post-surgical status including decreased ROM, strength and function.    []Signs/symptoms consistent with joint sprain/strain   []Signs/symptoms consistent with patella-femoral syndrome   []Signs/symptoms consistent with knee OA/hip OA   []Signs/symptoms consistent with internal derangement of knee/Hip   []Signs/symptoms consistent with functional hip weakness/NMR control      []Signs/symptoms consistent with tendinitis/tendinosis    []signs/symptoms consistent with pathology which may benefit from Dry needling      []other:      Prognosis/Rehab Potential:      []Excellent   [x]Good    []Fair   []Poor    Tolerance of evaluation/treatment:    []Excellent   []Good    [x]Fair   []Poor  Physical Therapy Evaluation Complexity Justification  [x] A history of present problem with:  [x] no personal factors and/or comorbidities that impact the plan of care;  []1-2 personal factors and/or comorbidities that impact the plan of care  []3 personal factors and/or comorbidities that impact the plan of care  [x] An examination of body systems using standardized tests and measures addressing any of the following: body structures and functions (impairments), activity limitations, and/or participation restrictions;:  [x] a total of 1-2 or more elements   [] a total of 3 or more elements   [] a total of 4 or more elements   [x] A clinical presentation with:  [] stable and/or uncomplicated characteristics   [x] evolving clinical presentation with changing characteristics  [] unstable and unpredictable characteristics;   [x] Clinical decision making of [x] low, [] moderate, [] high complexity using standardized patient assessment instrument and/or measurable assessment of functional outcome. [x] EVAL (LOW) 99855 (typically 20 minutes face-to-face)  [] EVAL (MOD) 74879 (typically 30 minutes face-to-face)  [] EVAL (HIGH) 30659 (typically 45 minutes face-to-face)  [] RE-EVAL       PLAN:   Frequency/Duration:  2 days per week for 12 Weeks:  Interventions:  [x]  Therapeutic exercise including: strength training, ROM, for Lower extremity and core   [x]  NMR activation and proprioception for LE, Glutes and Core   [x]  Manual therapy as indicated for LE, Hip and spine to include: Dry Needling/IASTM, STM, PROM, Gr I-IV mobilizations, manipulation. [x] Modalities as needed that may include: thermal agents, E-stim, Biofeedback, US, iontophoresis as indicated  [x] Patient education on joint protection, postural re-education, activity modification, progression of HEP. HEP instruction: [employer]  Access Code: [email or web address]  URL: [email or web address]  Date: 08/31/2022  Prepared by: Joan Browne      GOALS:  Patient stated goal: to be able to walk   [] Progressing: [] Met: [] Not Met: [] Adjusted    Therapist goals for Patient:   Short Term Goals: To be achieved in: 2 weeks  1. Independent in HEP and progression per patient tolerance, in order to prevent re-injury. [] Progressing: [] Met: [] Not Met: [] Adjusted  2. Patient will have a decrease in pain to facilitate improvement in movement, function, and ADLs as indicated by Functional Deficits. [] Progressing: [] Met: [] Not Met: [] Adjusted    Long Term Goals: To be achieved in: 12 weeks      1. FOTO score to be equal or greater to the predicted score (57/100)  to assist with reaching prior level of function. [] Progressing: [] Met: [] Not Met: [] Adjusted   2. Patient will demonstrate increased AROM to 0-120 to allow for getting in and out of car as indicated by patients Functional Deficits. [] Progressing: [] Met: [] Not Met: [] Adjusted   3. Patient will demonstrate an increase in strength to 4+/5 to allow for sit to stand as indicated by patients Functional Deficits. [] Progressing: [] Met: [] Not Met: [] Adjusted   4. Patient will return to all transfers, work activities, and functional activities without increased symptoms or restriction, specifically deep water aerobics. [] Progressing: [] Met: [] Not Met: [] Adjusted   5. (Pt specific functional or activity goal)Pt will be able to walk for 20 minutes w/o walker and w/o increased s/s for grocery shopping. [] Progressing: [] Met: [] Not Met: [] Adjusted  6. Pt will demonstrate pain decreased by 50%  (0-4/10) with all ADLS.   [] Progressing: [] Met: [] Not Met: [] Adjusted      Electronically signed by:  Alysha Madsen, [address] [facility]

## 2022-08-31 NOTE — FLOWSHEET NOTE
[facility], [address] [place]  Phone: [phone]  Fax [phone]  :  Physical Therapy Treatment Note/ Progress Report:           Date:  2022    Patient Name:  Medina Espinosa    :  1955  MRN: [id number]      Referring Physician: Ronel Saavedra MD                                                     Evaluation Date: 2022                                           Date of Referral:22     Insurance: Medicare     Next MD appt: scheduled:9/15/22     Medical Diagnosis:  F37.978 (ICD-10-CM) - S/P total knee arthroplasty, left        Treatment Diagnosis:  M25.662 L knee stiffness; M25.462 L knee effusion;  M25.562 L knee pain; difficulty walking R26.2  S/P L knee TKA 22           Precautions/ Contra-indications:   Per pt: pain pump to stay in for 4 days then pull. Immobilizer on for walking and sleeping currently.       Has the plan of care been signed (Y/N):        []  Yes  [x]  No    Progress report will be due (10 Rx or 30 days ):   Visit 10 or        Recertification will be due (POC Duration  / 90 days ): 22     Is this a Progress Report:     []  Yes  [x]  No      If Yes:  Date Range for reporting period:  Beginnin22  Ending:              Visit # Date Range Insurance Allowable Requires auth   IN PERSON 1 22- BMN    [x]no        []yes:   TELEHEALTH       TOTAL              CX/NS       Next PT appt: 22                PT Practice Pattern:H  Co morbidities:1-2  Surgical:L TKA  DOS 22  Nonsurgical  INITIAL VISIT 22 CURRENT VISIT    PAIN 0-10/10 4-8/10    FUNCTIONAL SCALE FOTO 18/100    ROM KE -5     KF 45                            STRENGHT (MMT) Quad tone trace                                            Latex Allergy/Pacemaker/Adhesive allergy:  [x]NO      []YES     RESTRICTIONS/PRECAUTIONS: protocol         SUBJECTIVE:  Pain level:  4-8/10 See eval    OBJECTIVE: See eval  Observation:             Exercises/Interventions:     HEP:  Francisca  Access Code: [email or web address]  URL: [email or web address]  Date: 08/31/2022  Prepared by: Sudhir Tenorio      Therapeutic Ex (91539)  NMR re-education (71210) Reps/Sets/time Notes/CUES/Assessment HEP   Seated HS stretch 3x30\"  x   Seated calf stretch w strap 3x30\"  x   Seated QS 6x10\"  x   SLRF 2x  x   Seated heel slide w assist 5x15\"  x                                                                                             Pt education x10' Pt education was provided in post op restrictions, precautions, progression of rehab expectations and timeline. Manual Intervention (88844)                                      CUES NEEDED                Therapeutic Activity (15386)                                                Therapeutic Exercise and NMR EXR  [x] (67443) Provided verbal/tactile cueing for activities related to strengthening, flexibility, endurance, ROM for improvements in LE, proximal hip, and core control with self care, mobility, lifting, ambulation.  [] (29574) Provided verbal/tactile cueing for activities related to improving balance, coordination, kinesthetic sense, posture, motor skill, proprioception  to assist with LE, proximal hip, and core control in self care, mobility, lifting, ambulation and eccentric single leg control.      NMR and Therapeutic Activities:    [] (52948 or 88104) Provided verbal/tactile cueing for activities related to improving balance, coordination, kinesthetic sense, posture, motor skill, proprioception and motor activation to allow for proper function of core, proximal hip and LE with self care and ADLs  [] (60235) Gait Re-education- Provided training and instruction to the patient for proper LE, core and proximal hip recruitment and positioning and eccentric body weight control with ambulation re-education including up and down stairs     Home Exercise Program:    [x] (79421) Reviewed/Progressed HEP activities related to strengthening, flexibility, endurance, ROM of core, proximal hip and LE for functional self-care, mobility, lifting and ambulation/stair navigation   [] (26267)Reviewed/Progressed HEP activities related to improving balance, coordination, kinesthetic sense, posture, motor skill, proprioception of core, proximal hip and LE for self care, mobility, lifting, and ambulation/stair navigation      Manual Treatments:  PROM / STM / Oscillations-Mobs:  G-I, II, III, IV (PA's, Inf., Post.)  [] (21682) Provided manual therapy to mobilize LE, proximal hip and/or LS spine soft tissue/joints for the purpose of modulating pain, promoting relaxation,  increasing ROM, reducing/eliminating soft tissue swelling/inflammation/restriction, improving soft tissue extensibility and allowing for proper ROM for normal function with self care, mobility, lifting and ambulation. Trigger point Dry Needling:  fine needle insertion into myofascial trigger points to stimulate a healing response  [] (07149) Needle insertion without injection: 1 or 2 muscles  [] (60628) Needle insertion without injection: 3 or more muscles    Modalities:  Pt demonstrates edema and swelling that does not respond to regular conventional RICE and requires use of a compression/vasopneumatic device to reduce. [x] GAME READY (VASO)- for significant edema, swelling, pain control.  15'   [] ESU (HV/PM) for edema and pain control      Charges:  Timed Code Treatment Minutes: 23   Total Treatment Minutes: 48     Medicare total (add KX at $2000)  200        [x] EVAL (LOW) 78980 (typically 20 minutes face-to-face)  [] EVAL (MOD) 63231 (typically 30 minutes face-to-face)  [] EVAL (HIGH) 27413 (typically 45 minutes face-to-face)  [] RE-EVAL     [x] OQ(98289) x  2   [] IONTO  [] NMR (56616) x     [x] VASO  [] Manual (16816) x      [] Other:  [] TA x      [] Mech Traction (09838)  [] ES(attended) (19745)      [] ES (un) (63142): GOALS:      Patient stated goal: to be able to walk   [] Progressing: [] Met: [] Not Met: [] Adjusted     Therapist goals for Patient:   Short Term Goals: To be achieved in: 2 weeks  1. Independent in HEP and progression per patient tolerance, in order to prevent re-injury. [] Progressing: [] Met: [] Not Met: [] Adjusted  2. Patient will have a decrease in pain to facilitate improvement in movement, function, and ADLs as indicated by Functional Deficits. [] Progressing: [] Met: [] Not Met: [] Adjusted     Long Term Goals: To be achieved in: 12 weeks        1. FOTO score to be equal or greater to the predicted score (57/100)  to assist with reaching prior level of function. [] Progressing: [] Met: [] Not Met: [] Adjusted      2. Patient will demonstrate increased AROM to 0-120 to allow for getting in and out of car as indicated by patients Functional Deficits. [] Progressing: [] Met: [] Not Met: [] Adjusted       3. Patient will demonstrate an increase in strength to 4+/5 to allow for sit to stand as indicated by patients Functional Deficits. [] Progressing: [] Met: [] Not Met: [] Adjusted       4. Patient will return to all transfers, work activities, and functional activities without increased symptoms or restriction, specifically deep water aerobics. [] Progressing: [] Met: [] Not Met: [] Adjusted       5. (Pt specific functional or activity goal)Pt will be able to walk for 20 minutes w/o walker and w/o increased s/s for grocery shopping. [] Progressing: [] Met: [] Not Met: [] Adjusted  6. Pt will demonstrate pain decreased by 50%  (0-4/10) with all ADLS. [] Progressing: [] Met: [] Not Met: [] Adjusted        ASSESSMENT:    See eval for initial assessment      Overall Progression Towards Functional goals/ Treatment Progress Update:  [] Patient is progressing as expected towards functional goals listed. [] Progression is slowed due to complexities/Impairments listed.   [] Progression has been slowed due to co-morbidities. [x] Plan just implemented, too soon to assess goals progression <30days   [] Goals require adjustment due to lack of progress  [] Patient is not progressing as expected and requires additional follow up with physician  [] Other    Prognosis for POC: [x] Good [] Fair  [] Poor      Patient requires continued skilled intervention: [x] Yes  [] No    Treatment/Activity Tolerance:  [x] Patient able to complete treatment  [] Patient limited by fatigue  [] Patient limited by pain    [] Patient limited by other medical complications  [] Other:             PLAN: See eval for initial POC  [] Continue per plan of care [] Alter current plan (see comments above)  [x] Plan of care initiated [] Hold pending MD visit [] Discharge      Electronically signed by:  Ron Song, [place] [id number]    Note: If patient does not return for scheduled/ recommended follow up visits, this note will serve as a discharge from care along with most recent update on progress.

## 2022-08-31 NOTE — CARE COORDINATION
Called patient for updates. Left message for return call. Called Spouse/EC for patient updates. Left message for return call.   Shayla Somers BSN  Care Transition Nurse for [facility]  [phone]

## 2022-09-01 ENCOUNTER — CARE COORDINATION (OUTPATIENT)
Dept: CASE MANAGEMENT | Age: 67
End: 2022-09-01

## 2022-09-02 ENCOUNTER — HOSPITAL ENCOUNTER (OUTPATIENT)
Dept: PHYSICAL THERAPY | Age: 67
Setting detail: THERAPIES SERIES
Discharge: HOME | End: 2022-09-02
Payer: MEDICARE

## 2022-09-02 PROCEDURE — 97140 MANUAL THERAPY 1/> REGIONS: CPT | Performed by: PHYSICAL THERAPIST

## 2022-09-02 PROCEDURE — 97112 NEUROMUSCULAR REEDUCATION: CPT | Performed by: PHYSICAL THERAPIST

## 2022-09-02 PROCEDURE — 97016 VASOPNEUMATIC DEVICE THERAPY: CPT | Performed by: PHYSICAL THERAPIST

## 2022-09-02 PROCEDURE — 97110 THERAPEUTIC EXERCISES: CPT | Performed by: PHYSICAL THERAPIST

## 2022-09-02 NOTE — FLOWSHEET NOTE
[facility], [address] [place] [address]  Phone: [phone]  Fax [phone]  :  Physical Therapy Treatment Note/ Progress Report:           Date:  2022    Patient Name:  Delfina Erazo    :  1955  MRN: [id number]      Referring Physician: Sadie Montilla MD                                                     Evaluation Date: 2022                                           Date of Referral:22     Insurance: Medicare     Next MD appt: scheduled:9/15/22     Medical Diagnosis:  E79.269 (ICD-10-CM) - S/P total knee arthroplasty, left        Treatment Diagnosis:  M25.662 L knee stiffness; M25.462 L knee effusion;  M25.562 L knee pain; difficulty walking R26.2  S/P L knee TKA 22           Precautions/ Contra-indications:   Per pt: pain pump to stay in for 4 days then pull. Immobilizer on for walking and sleeping currently. Has the plan of care been signed (Y/N):        []  Yes  [x]  No    Progress report will be due (10 Rx or 30 days ):   Visit 10 or        Recertification will be due (POC Duration  / 90 days ): 22     Is this a Progress Report:     []  Yes  [x]  No      If Yes:  Date Range for reporting period:  Beginnin22  Ending:              Visit # Date Range Insurance Allowable Requires auth   IN PERSON 2 22- BMN    [x]no        []yes:   TELEHEALTH       TOTAL              CX/NS       Next PT appt: 22                PT Practice Pattern:H  Co morbidities:1-2  Surgical:L TKA  DOS 22  Nonsurgical  INITIAL VISIT 22 CURRENT VISIT    PAIN 0-10/10 4-8/10    FUNCTIONAL SCALE FOTO 18/100    ROM KE -5     KF 45                            STRENGHT (MMT) Quad tone trace                                            Latex Allergy/Pacemaker/Adhesive allergy:  [x]NO      []YES     RESTRICTIONS/PRECAUTIONS: protocol         SUBJECTIVE:  Pain level:  4-8/10 pt.  Reports that she is struggling with the leg lift at home, she is walking between rooms to get to the bathroom, but not a lot of other walking yet. Her son helped her remove the pump and change the bandage yesterday. OBJECTIVE: See eval  Observation: trace quad tone, tenderness globally about the post knee and thigh, mod swelling            Exercises/Interventions:     HEP:  [employer]  Access Code: [email or web address]  URL: [employer]/  Date: 08/31/2022  Prepared by: Mumtaz Sosa      Therapeutic Ex (01132)  NMR re-education (60460) Reps/Sets/time Notes/CUES/Assessment HEP   Seated HS stretch 3x30\"  x   Seated calf stretch w strap 3x30\"  x   Seated QS /with NMES 6x10\"/10'  Biphasic 10/10 2.0 ramp x   SLRF 6x Min assist to lift at first then independent x   Seated heel slide w assist 5x15\"  x                                                                                             Pt education x10' Pt education was provided in post op restrictions, precautions, progression of rehab expectations and timeline. Manual Intervention (46663)      Patellar mobs Gr I-II 3'     PROM knee flexion 5'                         CUES NEEDED                Therapeutic Activity (46644)            Gait with RW 75 ft.x2                                    Therapeutic Exercise and NMR EXR  [x] (06549) Provided verbal/tactile cueing for activities related to strengthening, flexibility, endurance, ROM for improvements in LE, proximal hip, and core control with self care, mobility, lifting, ambulation.  [] (19488) Provided verbal/tactile cueing for activities related to improving balance, coordination, kinesthetic sense, posture, motor skill, proprioception  to assist with LE, proximal hip, and core control in self care, mobility, lifting, ambulation and eccentric single leg control.      NMR and Therapeutic Activities:    [] (74846 or 84379) Provided verbal/tactile cueing for activities related to improving balance, coordination, kinesthetic Medicare total (add KX at $2000)  300        [] EVAL (LOW) 44468 (typically 20 minutes face-to-face)  [] EVAL (MOD) 71940 (typically 30 minutes face-to-face)  [] EVAL (HIGH) 35126 (typically 45 minutes face-to-face)  [] RE-EVAL     [x] TW(12071) x  1   [] IONTO  [x] NMR (83445) x  1   [x] VASO  [x] Manual (83043) x  1    [] Other:  [] TA x      [] Mech Traction (21442)  [] ES(attended) (37804)      [] ES (un) (13861):             GOALS:      Patient stated goal: to be able to walk   [] Progressing: [] Met: [] Not Met: [] Adjusted     Therapist goals for Patient:   Short Term Goals: To be achieved in: 2 weeks  1. Independent in HEP and progression per patient tolerance, in order to prevent re-injury. [] Progressing: [] Met: [] Not Met: [] Adjusted  2. Patient will have a decrease in pain to facilitate improvement in movement, function, and ADLs as indicated by Functional Deficits. [] Progressing: [] Met: [] Not Met: [] Adjusted     Long Term Goals: To be achieved in: 12 weeks        1. FOTO score to be equal or greater to the predicted score (57/100)  to assist with reaching prior level of function. [] Progressing: [] Met: [] Not Met: [] Adjusted      2. Patient will demonstrate increased AROM to 0-120 to allow for getting in and out of car as indicated by patients Functional Deficits. [] Progressing: [] Met: [] Not Met: [] Adjusted       3. Patient will demonstrate an increase in strength to 4+/5 to allow for sit to stand as indicated by patients Functional Deficits. [] Progressing: [] Met: [] Not Met: [] Adjusted       4. Patient will return to all transfers, work activities, and functional activities without increased symptoms or restriction, specifically deep water aerobics. [] Progressing: [] Met: [] Not Met: [] Adjusted       5. (Pt specific functional or activity goal)Pt will be able to walk for 20 minutes w/o walker and w/o increased s/s for grocery shopping.    [] Progressing: [] Met: [] Not Met: [] Adjusted  6. Pt will demonstrate pain decreased by 50%  (0-4/10) with all ADLS. [] Progressing: [] Met: [] Not Met: [] Adjusted        ASSESSMENT: pt. Doing well 4 days post TKA with improving tolerance to gait with SW and use of immobilizer. Able to progress today in clinic with NMES for quad NMR. Challenged with SLR but able to achieve independent lift following NMES. Knee flexion supine assisted ~80. Overall Progression Towards Functional goals/ Treatment Progress Update:  [] Patient is progressing as expected towards functional goals listed. [] Progression is slowed due to complexities/Impairments listed. [] Progression has been slowed due to co-morbidities. [x] Plan just implemented, too soon to assess goals progression <30days   [] Goals require adjustment due to lack of progress  [] Patient is not progressing as expected and requires additional follow up with physician  [] Other    Prognosis for POC: [x] Good [] Fair  [] Poor      Patient requires continued skilled intervention: [x] Yes  [] No    Treatment/Activity Tolerance:  [x] Patient able to complete treatment  [] Patient limited by fatigue  [] Patient limited by pain    [] Patient limited by other medical complications  [] Other:             PLAN: Cont. PT 2x/week  [x] Continue per plan of care [] Alter current plan (see comments above)  [] Plan of care initiated [] Hold pending MD visit [] Discharge      Electronically signed by:  Andie Lawson, PT MSPT, OMT-C [id number]      Note: If patient does not return for scheduled/ recommended follow up visits, this note will serve as a discharge from care along with most recent update on progress.

## 2022-09-06 ENCOUNTER — HOSPITAL ENCOUNTER (OUTPATIENT)
Dept: PHYSICAL THERAPY | Age: 67
Setting detail: THERAPIES SERIES
Discharge: HOME | End: 2022-09-06
Payer: MEDICARE

## 2022-09-06 PROCEDURE — 97140 MANUAL THERAPY 1/> REGIONS: CPT

## 2022-09-06 PROCEDURE — 97110 THERAPEUTIC EXERCISES: CPT

## 2022-09-06 NOTE — FLOWSHEET NOTE
[facility], [address] [place]  Phone: [phone]  Fax [phone]  :  Physical Therapy Treatment Note/ Progress Report:           Date:  2022    Patient Name:  Shubham Nassar    :  1955  MRN: [id number]      Referring Physician: Bruno Hsu MD                                                     Evaluation Date: 2022                                           Date of Referral:22     Insurance: Medicare     Next MD appt: scheduled:9/15/22     Medical Diagnosis:  O21.439 (ICD-10-CM) - S/P total knee arthroplasty, left        Treatment Diagnosis:  M25.662 L knee stiffness; M25.462 L knee effusion;  M25.562 L knee pain; difficulty walking R26.2  S/P L knee TKA 22           Precautions/ Contra-indications:   Per pt: pain pump to stay in for 4 days then pull. Immobilizer on for walking and sleeping currently. Has the plan of care been signed (Y/N):        [x]  Yes  []  No    Progress report will be due (10 Rx or 30 days ):   Visit 10 or        Recertification will be due (POC Duration  / 90 days ): 22     Is this a Progress Report:     []  Yes  [x]  No      If Yes:  Date Range for reporting period:  Beginnin22  Ending:              Visit # Date Range Insurance Allowable Requires auth   IN PERSON 3 22- BMN    [x]no        []yes:   TELEHEALTH       TOTAL              CX/NS       Next PT appt: 22                PT Practice Pattern:H  Co morbidities:1-2  Surgical:L TKA  DOS 22  Nonsurgical  INITIAL VISIT 22 CURRENT VISIT    PAIN 0-10/10 4-8/10    FUNCTIONAL SCALE FOTO 18/100    ROM KE -5     KF 45 75                           STRENGHT (MMT) Quad tone trace                                            Latex Allergy/Pacemaker/Adhesive allergy:  [x]NO      []YES     RESTRICTIONS/PRECAUTIONS: protocol         SUBJECTIVE:  Pain level:  4-5/10.  Patient reports swelling in the L knee however believes it is getting better. Patient states they are able to lift their leg straight better since last visit. The patient reports that the patient's son helps with their bandages. OBJECTIVE: See eval  Observation: trace quad tone, tenderness globally about the post knee and thigh, mod swelling            Exercises/Interventions:     HEP:  [employer]  Access Code: [email or web address]  URL: [employer]/  Date: 08/31/2022  Prepared by: Britta Duckworth      Therapeutic Ex (22787)  NMR re-education (74158) 9/6/22  Reps/Sets/time Reps/Sets/time Notes/CUES/Assessment HEP   Seated HS stretch  3x30\"  x   Seated calf stretch w strap 3 x 30\" 3x30\"  x   Quad sets  3 x 10; 5\"      Hip abd. in sidelying - bilateral 3 x 10      Seated QS /with NMES  6x10\"/10'  Biphasic 10/10 2.0 ramp x   Adduction squeezes in hooklying 2 x 15; 3\"      SLRF - bilateral 3 x 10 6x Min assist to lift at first then independent x   Seated heel slide w assist 1 x 15; 3\" 5x15\"  x                                                                                                            Pt education  x10' Pt education was provided in post op restrictions, precautions, progression of rehab expectations and timeline.             Manual Intervention (14960)       Patellar mobs Gr I-II 4 min 3'     PROM knee flexion 4 min 5'                             CUES NEEDED                  Therapeutic Activity (65011)              Gait with RW  75 ft.x2                                        Therapeutic Exercise and NMR EXR  [x] (45868) Provided verbal/tactile cueing for activities related to strengthening, flexibility, endurance, ROM for improvements in LE, proximal hip, and core control with self care, mobility, lifting, ambulation.  [] (90071) Provided verbal/tactile cueing for activities related to improving balance, coordination, kinesthetic sense, posture, motor skill, proprioception  to assist with LE, proximal hip, and core control in self care, mobility, lifting, ambulation and eccentric single leg control. NMR and Therapeutic Activities:    [] (81478 or 55913) Provided verbal/tactile cueing for activities related to improving balance, coordination, kinesthetic sense, posture, motor skill, proprioception and motor activation to allow for proper function of core, proximal hip and LE with self care and ADLs  [] (69756) Gait Re-education- Provided training and instruction to the patient for proper LE, core and proximal hip recruitment and positioning and eccentric body weight control with ambulation re-education including up and down stairs     Home Exercise Program:    [x] (22488) Reviewed/Progressed HEP activities related to strengthening, flexibility, endurance, ROM of core, proximal hip and LE for functional self-care, mobility, lifting and ambulation/stair navigation   [] (92573)Reviewed/Progressed HEP activities related to improving balance, coordination, kinesthetic sense, posture, motor skill, proprioception of core, proximal hip and LE for self care, mobility, lifting, and ambulation/stair navigation      Manual Treatments:  PROM / STM / Oscillations-Mobs:  G-I, II, III, IV (PA's, Inf., Post.)  [] (40018) Provided manual therapy to mobilize LE, proximal hip and/or LS spine soft tissue/joints for the purpose of modulating pain, promoting relaxation,  increasing ROM, reducing/eliminating soft tissue swelling/inflammation/restriction, improving soft tissue extensibility and allowing for proper ROM for normal function with self care, mobility, lifting and ambulation.      Trigger point Dry Needling:  fine needle insertion into myofascial trigger points to stimulate a healing response  [] (56936) Needle insertion without injection: 1 or 2 muscles  [] (74583) Needle insertion without injection: 3 or more muscles    Modalities:  Pt demonstrates edema and swelling that does not respond to regular conventional RICE and requires use of a compression/vasopneumatic device to reduce. [x] GAME READY (VASO)- for significant edema, swelling, pain control. 15'   [] ESU (HV/PM) for edema and pain control      Charges:  Timed Code Treatment Minutes: 45   Total Treatment Minutes: 60     Medicare total (add KX at $2000)  300        [] EVAL (LOW) 16935 (typically 20 minutes face-to-face)  [] EVAL (MOD) 00280 (typically 30 minutes face-to-face)  [] EVAL (HIGH) 25624 (typically 45 minutes face-to-face)  [] RE-EVAL     [x] BW(56699) x  2   [] IONTO  [] NMR (31552) x     [x] VASO  [x] Manual (27865) x  1   [] Other:  [] TA x      [] Mech Traction (02272)  [] ES(attended) (87092)     [] ES (un) (81932):             GOALS:      Patient stated goal: to be able to walk   [x] Progressing: [] Met: [] Not Met: [] Adjusted     Therapist goals for Patient:   Short Term Goals: To be achieved in: 2 weeks  1. Independent in HEP and progression per patient tolerance, in order to prevent re-injury. [x] Progressing: [] Met: [] Not Met: [] Adjusted  2. Patient will have a decrease in pain to facilitate improvement in movement, function, and ADLs as indicated by Functional Deficits. [x] Progressing: [] Met: [] Not Met: [] Adjusted     Long Term Goals: To be achieved in: 12 weeks        1. FOTO score to be equal or greater to the predicted score (57/100)  to assist with reaching prior level of function. [x] Progressing: [] Met: [] Not Met: [] Adjusted      2. Patient will demonstrate increased AROM to 0-120 to allow for getting in and out of car as indicated by patients Functional Deficits. [x] Progressing: [] Met: [] Not Met: [] Adjusted       3. Patient will demonstrate an increase in strength to 4+/5 to allow for sit to stand as indicated by patients Functional Deficits. [x] Progressing: [] Met: [] Not Met: [] Adjusted       4.  Patient will return to all transfers, work activities, and functional activities without increased symptoms or restriction, specifically deep water aerobics. [x] Progressing: [] Met: [] Not Met: [] Adjusted       5. (Pt specific functional or activity goal)Pt will be able to walk for 20 minutes w/o walker and w/o increased s/s for grocery shopping. [x] Progressing: [] Met: [] Not Met: [] Adjusted  6. Pt will demonstrate pain decreased by 50%  (0-4/10) with all ADLS. [x] Progressing: [] Met: [] Not Met: [] Adjusted        ASSESSMENT: The patient is doing well s/p one week since TKA with improving tolerance to gait and to being able to increase knee flexion. Able to add SL hip abduction to improve gluteal medius strength. Challenged with SLR and able to achieve independent lift without NMES (unable to perform last time without NMES). The patient would continue to benefit from skilled physical therapy to improve strength, ROM, decrease edam, and improve weightbearing in order for the patient to return to PLOF without pain. Overall Progression Towards Functional goals/ Treatment Progress Update:  [] Patient is progressing as expected towards functional goals listed. [] Progression is slowed due to complexities/Impairments listed. [] Progression has been slowed due to co-morbidities. [x] Plan just implemented, too soon to assess goals progression <30days   [] Goals require adjustment due to lack of progress  [] Patient is not progressing as expected and requires additional follow up with physician  [] Other    Prognosis for POC: [x] Good [] Fair  [] Poor      Patient requires continued skilled intervention: [x] Yes  [] No    Treatment/Activity Tolerance:  [x] Patient able to complete treatment  [] Patient limited by fatigue  [] Patient limited by pain    [] Patient limited by other medical complications  [] Other:             PLAN: Cont.  PT 2x/week  [x] Continue per plan of care [] Alter current plan (see comments above)  [] Plan of care initiated [] Hold pending MD visit [] Discharge      Electronically signed by:  Naomie Tucker PT, DPT, CSCS      Note: If patient does not return for scheduled/ recommended follow up visits, this note will serve as a discharge from care along with most recent update on progress.

## 2022-09-07 ENCOUNTER — APPOINTMENT (OUTPATIENT)
Dept: PHYSICAL THERAPY | Age: 67
End: 2022-09-07
Payer: MEDICARE

## 2022-09-08 ENCOUNTER — CARE COORDINATION (OUTPATIENT)
Dept: CASE MANAGEMENT | Age: 67
End: 2022-09-08

## 2022-09-08 NOTE — CARE COORDINATION
Called patient for updates. Left message for return call.   Michelle BLANCON  Care Transition Nurse for [facility]  [phone]

## 2022-09-09 ENCOUNTER — HOSPITAL ENCOUNTER (OUTPATIENT)
Dept: PHYSICAL THERAPY | Age: 67
Setting detail: THERAPIES SERIES
Discharge: HOME | End: 2022-09-09
Payer: MEDICARE

## 2022-09-09 PROCEDURE — 97016 VASOPNEUMATIC DEVICE THERAPY: CPT | Performed by: PHYSICAL THERAPIST

## 2022-09-09 PROCEDURE — 97110 THERAPEUTIC EXERCISES: CPT | Performed by: PHYSICAL THERAPIST

## 2022-09-09 PROCEDURE — 97140 MANUAL THERAPY 1/> REGIONS: CPT | Performed by: PHYSICAL THERAPIST

## 2022-09-09 NOTE — FLOWSHEET NOTE
[facility], [address] [place]  Phone: [phone]  Fax [phone]  :  Physical Therapy Treatment Note/ Progress Report:           Date:  2022    Patient Name:  Colleen Miller    :  1955  MRN: [id number]      Referring Physician: Annie Corona MD                                                     Evaluation Date: 2022                                           Date of Referral:22     Insurance: Medicare     Next MD appt: scheduled:9/15/22     Medical Diagnosis:  X99.750 (ICD-10-CM) - S/P total knee arthroplasty, left        Treatment Diagnosis:  M25.662 L knee stiffness; M25.462 L knee effusion;  M25.562 L knee pain; difficulty walking R26.2  S/P L knee TKA 22           Precautions/ Contra-indications:   Per pt: pain pump to stay in for 4 days then pull. Immobilizer on for walking and sleeping currently. Has the plan of care been signed (Y/N):        [x]  Yes  []  No    Progress report will be due (10 Rx or 30 days ):   Visit 10 or        Recertification will be due (POC Duration  / 90 days ): 22     Is this a Progress Report:     []  Yes  [x]  No      If Yes:  Date Range for reporting period:  Beginnin22  Ending:              Visit # Date Range Insurance Allowable Requires auth   IN PERSON 4 22- BMN    [x]no        []yes:   TELEHEALTH       TOTAL              CX/NS       Next PT appt: 22                PT Practice Pattern:H  Co morbidities:1-2  Surgical:L TKA  DOS 22  Nonsurgical  INITIAL VISIT 22 CURRENT VISIT    PAIN 0-10/10 4-8/10    FUNCTIONAL SCALE FOTO 18/100    ROM KE -5     KF 45 85 with OP                           STRENGHT (MMT) Quad tone trace                                            Latex Allergy/Pacemaker/Adhesive allergy:  [x]NO      []YES     RESTRICTIONS/PRECAUTIONS: protocol         SUBJECTIVE:  Pain level:  4-6/10. Pt.  Reports that she is having some issues with sleeping and had some digestive issues. She is down to taking tylenol    OBJECTIVE: See eval  Observation: fair- quad tone, mod swelling medially, HS restriction laterally            Exercises/Interventions:     HEP:  Francisca  Access Code: [email or web address]  URL: [email or web address]  Date: 08/31/2022  Prepared by: Jaiime Lake      Therapeutic Ex (97619)  NMR re-education (38359) Reps/Sets/time Notes/CUES/Assessment HEP   Seated HS stretch 3x30\"  x   Seated calf stretch w strap 3x30\"  x   Supine TKE with towel roll  :5x10 Following ext mobs    Hip abd. in sidelying - bilateral 3x10     Seated QS /with NMES  Biphasic 10/10 2.0 ramp x   Adduction squeezes in hooklying      SLRF - bilateral 3x10 Min assist to lift at first then independent x   Seated heel slide w assist 5x15\"  x                     Bike partial rev 5'                                                                       Pt education x10' Pt education was provided in post op restrictions, precautions, progression of rehab expectations and timeline. Manual Intervention (69742)      Patellar mobs Gr I-II 1'     Mobs for knee ext/flex 6'     PROM knee flexion 5'                         CUES NEEDED                Therapeutic Activity (39034)            Gait with RW 75 ft.x2  Without brace cues for knee flexion and heel strike                                  Therapeutic Exercise and NMR EXR  [x] (37068) Provided verbal/tactile cueing for activities related to strengthening, flexibility, endurance, ROM for improvements in LE, proximal hip, and core control with self care, mobility, lifting, ambulation.  [] (39668) Provided verbal/tactile cueing for activities related to improving balance, coordination, kinesthetic sense, posture, motor skill, proprioception  to assist with LE, proximal hip, and core control in self care, mobility, lifting, ambulation and eccentric single leg control.      NMR and Therapeutic Activities:    [] (82915 or 74700) Provided verbal/tactile cueing for activities related to improving balance, coordination, kinesthetic sense, posture, motor skill, proprioception and motor activation to allow for proper function of core, proximal hip and LE with self care and ADLs  [] (05606) Gait Re-education- Provided training and instruction to the patient for proper LE, core and proximal hip recruitment and positioning and eccentric body weight control with ambulation re-education including up and down stairs     Home Exercise Program:    [x] (27121) Reviewed/Progressed HEP activities related to strengthening, flexibility, endurance, ROM of core, proximal hip and LE for functional self-care, mobility, lifting and ambulation/stair navigation   [] (83448)Reviewed/Progressed HEP activities related to improving balance, coordination, kinesthetic sense, posture, motor skill, proprioception of core, proximal hip and LE for self care, mobility, lifting, and ambulation/stair navigation      Manual Treatments:  PROM / STM / Oscillations-Mobs:  G-I, II, III, IV (PA's, Inf., Post.)  [] (64572) Provided manual therapy to mobilize LE, proximal hip and/or LS spine soft tissue/joints for the purpose of modulating pain, promoting relaxation,  increasing ROM, reducing/eliminating soft tissue swelling/inflammation/restriction, improving soft tissue extensibility and allowing for proper ROM for normal function with self care, mobility, lifting and ambulation. Trigger point Dry Needling:  fine needle insertion into myofascial trigger points to stimulate a healing response  [] (99238) Needle insertion without injection: 1 or 2 muscles  [] (65922) Needle insertion without injection: 3 or more muscles    Modalities:  Pt demonstrates edema and swelling that does not respond to regular conventional RICE and requires use of a compression/vasopneumatic device to reduce.      [x] GAME READY (VASO)- for significant edema, swelling, pain control. 15'   [] ESU (HV/PM) for edema and pain control      Charges:  Timed Code Treatment Minutes: 45   Total Treatment Minutes: 60     Medicare total (add KX at $2000)  400        [] EVAL (LOW) 26969 (typically 20 minutes face-to-face)  [] EVAL (MOD) 07157 (typically 30 minutes face-to-face)  [] EVAL (HIGH) 86920 (typically 45 minutes face-to-face)  [] RE-EVAL     [x] IE(02923) x  2   [] IONTO  [] NMR (68580) x     [x] VASO  [x] Manual (77002) x  1   [] Other:  [] TA x      [] Mech Traction (73996)  [] ES(attended) (41568)     [] ES (un) (69315):             GOALS:      Patient stated goal: to be able to walk   [x] Progressing: [] Met: [] Not Met: [] Adjusted     Therapist goals for Patient:   Short Term Goals: To be achieved in: 2 weeks  1. Independent in HEP and progression per patient tolerance, in order to prevent re-injury. [x] Progressing: [] Met: [] Not Met: [] Adjusted  2. Patient will have a decrease in pain to facilitate improvement in movement, function, and ADLs as indicated by Functional Deficits. [x] Progressing: [] Met: [] Not Met: [] Adjusted     Long Term Goals: To be achieved in: 12 weeks        1. FOTO score to be equal or greater to the predicted score (57/100)  to assist with reaching prior level of function. [x] Progressing: [] Met: [] Not Met: [] Adjusted      2. Patient will demonstrate increased AROM to 0-120 to allow for getting in and out of car as indicated by patients Functional Deficits. [x] Progressing: [] Met: [] Not Met: [] Adjusted       3. Patient will demonstrate an increase in strength to 4+/5 to allow for sit to stand as indicated by patients Functional Deficits. [x] Progressing: [] Met: [] Not Met: [] Adjusted       4. Patient will return to all transfers, work activities, and functional activities without increased symptoms or restriction, specifically deep water aerobics. [x] Progressing: [] Met: [] Not Met: [] Adjusted       5.  (Pt specific functional or activity goal)Pt will be able to walk for 20 minutes w/o walker and w/o increased s/s for grocery shopping. [x] Progressing: [] Met: [] Not Met: [] Adjusted  6. Pt will demonstrate pain decreased by 50%  (0-4/10) with all ADLS. [x] Progressing: [] Met: [] Not Met: [] Adjusted        ASSESSMENT: pt. Cont. To make progress with ROM and quad strength, she is now able to perform SLR independently with slight lag. Added bike today with good tolerance. Overall Progression Towards Functional goals/ Treatment Progress Update:  [] Patient is progressing as expected towards functional goals listed. [] Progression is slowed due to complexities/Impairments listed. [] Progression has been slowed due to co-morbidities. [x] Plan just implemented, too soon to assess goals progression <30days   [] Goals require adjustment due to lack of progress  [] Patient is not progressing as expected and requires additional follow up with physician  [] Other    Prognosis for POC: [x] Good [] Fair  [] Poor      Patient requires continued skilled intervention: [x] Yes  [] No    Treatment/Activity Tolerance:  [x] Patient able to complete treatment  [] Patient limited by fatigue  [] Patient limited by pain    [] Patient limited by other medical complications  [] Other:             PLAN: Cont. PT 2x/week  [x] Continue per plan of care [] Alter current plan (see comments above)  [] Plan of care initiated [] Hold pending MD visit [] Discharge      Electronically signed by:  Tao Elizalde PT, MSPT, OMT-C [id number]        Note: If patient does not return for scheduled/ recommended follow up visits, this note will serve as a discharge from care along with most recent update on progress.

## 2022-09-13 ENCOUNTER — HOSPITAL ENCOUNTER (OUTPATIENT)
Dept: PHYSICAL THERAPY | Age: 67
Setting detail: THERAPIES SERIES
Discharge: HOME | End: 2022-09-13
Payer: MEDICARE

## 2022-09-13 PROCEDURE — 97140 MANUAL THERAPY 1/> REGIONS: CPT | Performed by: PHYSICAL THERAPIST

## 2022-09-13 PROCEDURE — 97016 VASOPNEUMATIC DEVICE THERAPY: CPT | Performed by: PHYSICAL THERAPIST

## 2022-09-13 PROCEDURE — 97110 THERAPEUTIC EXERCISES: CPT | Performed by: PHYSICAL THERAPIST

## 2022-09-13 NOTE — PROGRESS NOTES
necessarily pain in her knee. She is not wearing her brace any longer. Pt is doing her exercises 2x day. Pain level:  4-5/10    OBJECTIVE:   Observation: bruising below knee, tight distal ITB            Exercises/Interventions:     HEP:  [employer]  Access Code: [email or web address]  URL: [email or web address]  Date: 08/31/2022  Prepared by: Andres Chacon              GOALS:      Patient stated goal: to be able to walk   [x] Progressing: [] Met: [] Not Met: [] Adjusted     Therapist goals for Patient:   Short Term Goals: To be achieved in: 2 weeks  1. Independent in HEP and progression per patient tolerance, in order to prevent re-injury. [x] Progressing: [] Met: [] Not Met: [] Adjusted  2. Patient will have a decrease in pain to facilitate improvement in movement, function, and ADLs as indicated by Functional Deficits. [x] Progressing: [] Met: [] Not Met: [] Adjusted     Long Term Goals: To be achieved in: 12 weeks        1. FOTO score to be equal or greater to the predicted score (57/100)  to assist with reaching prior level of function. [x] Progressing: [] Met: [] Not Met: [] Adjusted      2. Patient will demonstrate increased AROM to 0-120 to allow for getting in and out of car as indicated by patients Functional Deficits. [x] Progressing: [] Met: [] Not Met: [] Adjusted       3. Patient will demonstrate an increase in strength to 4+/5 to allow for sit to stand as indicated by patients Functional Deficits. [x] Progressing: [] Met: [] Not Met: [] Adjusted       4. Patient will return to all transfers, work activities, and functional activities without increased symptoms or restriction, specifically deep water aerobics. [x] Progressing: [] Met: [] Not Met: [] Adjusted       5. (Pt specific functional or activity goal)Pt will be able to walk for 20 minutes w/o walker and w/o increased s/s for grocery shopping. [x] Progressing: [] Met: [] Not Met: [] Adjusted  6.  Pt will demonstrate pain decreased by 50% (0-4/10) with all ADLS. [x] Progressing: [] Met: [] Not Met: [] Adjusted        ASSESSMENT: Pt is spending much time at [facility] on flexion activities, try hard to improve. It was observed today that she is compensating on all flexion based stretches and ROM exercises with PF and hip hiking. The majority of the exercise time this visit was spent educating her omn proper mechanics to make the therex more effective. Improvement in ROM is slow at this time but expected to improve with new focus at home. Patient will benefit from continued skilled intervention to increase ROM, flexibility, strength and function. Overall Progression Towards Functional goals/ Treatment Progress Update:  [] Patient is progressing as expected towards functional goals listed. [x] Progression is slowed due to complexities/Impairments listed. [] Progression has been slowed due to co-morbidities. [] Plan just implemented, too soon to assess goals progression <30days   [] Goals require adjustment due to lack of progress  [] Patient is not progressing as expected and requires additional follow up with physician  [] Other    Prognosis for POC: [x] Good [] Fair  [] Poor      Patient requires continued skilled intervention: [x] Yes  [] No    Treatment/Activity Tolerance:  [x] Patient able to complete treatment  [] Patient limited by fatigue  [] Patient limited by pain    [] Patient limited by other medical complications  [] Other:             PLAN: Cont. PT 2x/week. Focu on ROM and strength. [x] Continue per plan of care [] Alter current plan (see comments above)  [] Plan of care initiated [] Hold pending MD visit [] Discharge      Electronically signed by:  Terrie Ang, PT, [id number]        Note: If patient does not return for scheduled/ recommended follow up visits, this note will serve as a discharge from care along with most recent update on progress.

## 2022-09-13 NOTE — FLOWSHEET NOTE
[facility], [address] [place]  Phone: [phone]  Fax [phone]  :  Physical Therapy Treatment Note/ Progress Report:           Date:  2022    Patient Name:  Timoteo Peabody    :  1955  MRN: [id number]      Referring Physician: [place] Bob MD                                                     Evaluation Date: 2022                                           Date of Referral:22     Insurance: Medicare     Next MD appt: scheduled:9/15/22     Medical Diagnosis:  G51.080 (ICD-10-CM) - S/P total knee arthroplasty, left        Treatment Diagnosis:  M25.662 L knee stiffness; M25.462 L knee effusion;  M25.562 L knee pain; difficulty walking R26.2  S/P L knee TKA 22           Precautions/ Contra-indications:         Has the plan of care been signed (Y/N):        [x]  Yes  []  No    Progress report will be due (10 Rx or 30 days ):   Visit 10 or 22 DONE visit 5 22  DUE visit 15       Recertification will be due (POC Duration  / 90 days ): 22     Is this a Progress Report:     [x]  Yes  []  No      If Yes:  Date Range for reporting period:  Beginnin22  Endin22              Visit # Date Range Insurance Allowable Requires auth   IN PERSON 5 22-22 BMN    [x]no        []yes:   TELEHEALTH       TOTAL              CX/NS       Next PT appt: 22                PT Practice Pattern:H  Co morbidities:1-2  Surgical:L TKA  DOS 22  Nonsurgical  INITIAL VISIT 22 CURRENT VISIT 22    PAIN 0-10/10 4-8/10 4-5/10   FUNCTIONAL SCALE FOTO 18/100 30/100   ROM KE -5 -5    KF 45 85                           STRENGHT (MMT) Quad tone trace Poor                                           Latex Allergy/Pacemaker/Adhesive allergy:  [x]NO      []YES     RESTRICTIONS/PRECAUTIONS: protocol         SUBJECTIVE:  Pt reports that she is still having sleeping overall, not necessarily pain in her knee. She is not wearing her brace any longer. Pt is doing her exercises 2x day. Pain level:  4-5/10    OBJECTIVE:   Observation: bruising below knee, tight distal ITB            Exercises/Interventions:     HEP:  Francisca  Access Code: [email or web address]  URL: [email or web address]  Date: 08/31/2022  Prepared by: Bernice Quevedo      Therapeutic Ex (54087)  NMR re-education (20363) Reps/Sets/time Notes/CUES/Assessment HEP     x     x               x          x     x         Bridges (small) 2x5x5\"  x   SLR HAB 15x3\"  x   Bike partial rev 8'                 Sit to stand neutral  Sit to stand L posterior 10x  10x VC    Sit and move bottom forward in chair foot stationary [id number]\" VC                                              Pt education x10' How to perform KF w/o hiking hip or substituting           Manual Intervention (38438)      Post tib mobs GII, PROM on SB, off bed, supine and modified Derrill Mackey'                                                     Therapeutic Activity (82821)            Gait with RW 75 ft.x2  Without brace cues for knee flexion and heel strike                                  Therapeutic Exercise and NMR EXR  [x] (02537) Provided verbal/tactile cueing for activities related to strengthening, flexibility, endurance, ROM for improvements in LE, proximal hip, and core control with self care, mobility, lifting, ambulation. [x] (80638) Provided verbal/tactile cueing for activities related to improving balance, coordination, kinesthetic sense, posture, motor skill, proprioception  to assist with LE, proximal hip, and core control in self care, mobility, lifting, ambulation and eccentric single leg control.      NMR and Therapeutic Activities:    [] (25315 or 77292) Provided verbal/tactile cueing for activities related to improving balance, coordination, kinesthetic sense, posture, motor skill, proprioception and motor activation to allow for proper function of core, proximal hip and LE with self care and ADLs  [] (79010) Gait Re-education- Provided training and instruction to the patient for proper LE, core and proximal hip recruitment and positioning and eccentric body weight control with ambulation re-education including up and down stairs     Home Exercise Program:    [x] (50716) Reviewed/Progressed HEP activities related to strengthening, flexibility, endurance, ROM of core, proximal hip and LE for functional self-care, mobility, lifting and ambulation/stair navigation   [] (33745)Reviewed/Progressed HEP activities related to improving balance, coordination, kinesthetic sense, posture, motor skill, proprioception of core, proximal hip and LE for self care, mobility, lifting, and ambulation/stair navigation      Manual Treatments:  PROM / STM / Oscillations-Mobs:  G-I, II, III, IV (PA's, Inf., Post.)  [x] (07057) Provided manual therapy to mobilize LE, proximal hip and/or LS spine soft tissue/joints for the purpose of modulating pain, promoting relaxation,  increasing ROM, reducing/eliminating soft tissue swelling/inflammation/restriction, improving soft tissue extensibility and allowing for proper ROM for normal function with self care, mobility, lifting and ambulation. Trigger point Dry Needling:  fine needle insertion into myofascial trigger points to stimulate a healing response  [] (93778) Needle insertion without injection: 1 or 2 muscles  [] (45947) Needle insertion without injection: 3 or more muscles    Modalities:  Pt demonstrates edema and swelling that does not respond to regular conventional RICE and requires use of a compression/vasopneumatic device to reduce. [x] GAME READY (VASO)- for significant edema, swelling, pain control.  15'   [] ESU (HV/PM) for edema and pain control      Charges:  Timed Code Treatment Minutes: 53   Total Treatment Minutes: 68     Medicare total (add KX at $2000)  500        [] EVAL (LOW) 455 1011 (typically 20 minutes face-to-face)  [] EVAL (MOD) 59578 (typically 30 minutes face-to-face)  [] EVAL (HIGH) 25460 (typically 45 minutes face-to-face)  [] RE-EVAL     [x] TO(33856) x  2   [] IONTO  [] NMR (98311) x     [x] VASO  [x] Manual (34972) x  2   [] Other:  [] TA x      [] Mech Traction (84872)  [] ES(attended) (04719)     [] ES (un) (69111):             GOALS:      Patient stated goal: to be able to walk   [x] Progressing: [] Met: [] Not Met: [] Adjusted     Therapist goals for Patient:   Short Term Goals: To be achieved in: 2 weeks  1. Independent in HEP and progression per patient tolerance, in order to prevent re-injury. [x] Progressing: [] Met: [] Not Met: [] Adjusted  2. Patient will have a decrease in pain to facilitate improvement in movement, function, and ADLs as indicated by Functional Deficits. [x] Progressing: [] Met: [] Not Met: [] Adjusted     Long Term Goals: To be achieved in: 12 weeks        1. FOTO score to be equal or greater to the predicted score (57/100)  to assist with reaching prior level of function. [x] Progressing: [] Met: [] Not Met: [] Adjusted      2. Patient will demonstrate increased AROM to 0-120 to allow for getting in and out of car as indicated by patients Functional Deficits. [x] Progressing: [] Met: [] Not Met: [] Adjusted       3. Patient will demonstrate an increase in strength to 4+/5 to allow for sit to stand as indicated by patients Functional Deficits. [x] Progressing: [] Met: [] Not Met: [] Adjusted       4. Patient will return to all transfers, work activities, and functional activities without increased symptoms or restriction, specifically deep water aerobics. [x] Progressing: [] Met: [] Not Met: [] Adjusted       5. (Pt specific functional or activity goal)Pt will be able to walk for 20 minutes w/o walker and w/o increased s/s for grocery shopping. [x] Progressing: [] Met: [] Not Met: [] Adjusted  6. Pt will demonstrate pain decreased by 50%  (0-4/10) with all ADLS.   [x] Progressing: [] Met: [] Not Met: [] Adjusted        ASSESSMENT: Pt is spending much time at [facility] on flexion activities, try hard to improve. It was observed today that she is compensating on all flexion based stretches and ROM exercises with PF and hip hiking. The majority of the exercise time this visit was spent educating her omn proper mechanics to make the therex more effective. Improvement in ROM is slow at this time but expected to improve with new focus at home. Patient will benefit from continued skilled intervention to increase ROM, flexibility, strength and function. Overall Progression Towards Functional goals/ Treatment Progress Update:  [] Patient is progressing as expected towards functional goals listed. [x] Progression is slowed due to complexities/Impairments listed. [] Progression has been slowed due to co-morbidities. [] Plan just implemented, too soon to assess goals progression <30days   [] Goals require adjustment due to lack of progress  [] Patient is not progressing as expected and requires additional follow up with physician  [] Other    Prognosis for POC: [x] Good [] Fair  [] Poor      Patient requires continued skilled intervention: [x] Yes  [] No    Treatment/Activity Tolerance:  [x] Patient able to complete treatment  [] Patient limited by fatigue  [] Patient limited by pain    [] Patient limited by other medical complications  [] Other:             PLAN: Cont. PT 2x/week. Focu on ROM and strength. [x] Continue per plan of care [] Alter current plan (see comments above)  [] Plan of care initiated [] Hold pending MD visit [] Discharge      Electronically signed by:  Marianna Murray, PT, [id number]        Note: If patient does not return for scheduled/ recommended follow up visits, this note will serve as a discharge from care along with most recent update on progress.

## 2022-09-14 ENCOUNTER — CARE COORDINATION (OUTPATIENT)
Dept: CASE MANAGEMENT | Age: 67
End: 2022-09-14

## 2022-09-14 NOTE — CARE COORDINATION
Called patient for updates. Left message for return call.   Yamel BLANCON  Care Transition Nurse for [facility]  [phone]

## 2022-09-15 ENCOUNTER — HOSPITAL ENCOUNTER (OUTPATIENT)
Dept: PHYSICAL THERAPY | Age: 67
Setting detail: THERAPIES SERIES
Discharge: HOME | End: 2022-09-15
Payer: MEDICARE

## 2022-09-15 ENCOUNTER — APPOINTMENT (OUTPATIENT)
Dept: PHYSICAL THERAPY | Age: 67
End: 2022-09-15
Payer: MEDICARE

## 2022-09-15 ENCOUNTER — OFFICE VISIT (OUTPATIENT)
Dept: ORTHOPEDIC SURGERY | Age: 67
End: 2022-09-15

## 2022-09-15 VITALS — HEIGHT: 64 IN | BODY MASS INDEX: 29.88 KG/M2 | WEIGHT: 175 LBS

## 2022-09-15 DIAGNOSIS — Z96.652 S/P TOTAL KNEE ARTHROPLASTY, LEFT: Primary | ICD-10-CM

## 2022-09-15 PROCEDURE — 97110 THERAPEUTIC EXERCISES: CPT | Performed by: PHYSICAL THERAPIST

## 2022-09-15 PROCEDURE — 99024 POSTOP FOLLOW-UP VISIT: CPT | Performed by: ORTHOPAEDIC SURGERY

## 2022-09-15 PROCEDURE — 97112 NEUROMUSCULAR REEDUCATION: CPT | Performed by: PHYSICAL THERAPIST

## 2022-09-15 PROCEDURE — 97016 VASOPNEUMATIC DEVICE THERAPY: CPT | Performed by: PHYSICAL THERAPIST

## 2022-09-15 PROCEDURE — 97140 MANUAL THERAPY 1/> REGIONS: CPT | Performed by: PHYSICAL THERAPIST

## 2022-09-15 NOTE — PROGRESS NOTES
Dr Annie Corona      Date /Time 9/15/2022       9:19 AM EDT  Name Colleen Miller             1955   Location  [facility]  MRN [id number]                Chief Complaint   Patient presents with    Post-Op Check     LEFT TKA 8/29/22        History of Present Illness      Colleen Miller is a 78 y.o. female is here for post-op visit after LEFT  [address]13 Total Knee Arthroplasty    Patient presents to the office today for follow-up visit. Patient is approximately 3 weeks status post left total knee arthroplasty. Patient doing well. Pain controlled. Patient denies any fever, chills, or drainage    Physical Exam    Based off 1997 Exam Criteria    Ht 5' 4\" (1.626 m)   Wt 175 lb (79.4 kg)   LMP  (LMP Unknown)   BMI 30.04 kg/m²      Constitutional:       General: He is not in acute distress. Appearance: Normal appearance. LEFT Knee: incision clean, intact, healing appropriately. No surrounding  erythema or fluctuance. Neuro intact distal. No evidence of DVT. Range of motion: 3-90    Imaging             Assessment and Plan    Brando Avila was seen today for post-op check. Diagnoses and all orders for this visit:    S/P total knee arthroplasty, left      Patient will continue physical therapy concentrating on range of motion. She will follow-up in 3 weeks for range of motion check and x-rays. Patient should follow-up sooner if problems arise. Electronically signed by Annie Corona MD on 9/15/2022 at 9:19 AM  This dictation was generated by voice recognition computer software. Although all attempts are made to edit the dictation for accuracy, there may be errors in the transcription that are not intended.

## 2022-09-20 ENCOUNTER — HOSPITAL ENCOUNTER (OUTPATIENT)
Dept: PHYSICAL THERAPY | Age: 67
Setting detail: THERAPIES SERIES
Discharge: HOME | End: 2022-09-20
Payer: MEDICARE

## 2022-09-20 ENCOUNTER — CARE COORDINATION (OUTPATIENT)
Dept: CASE MANAGEMENT | Age: 67
End: 2022-09-20

## 2022-09-20 PROCEDURE — 97140 MANUAL THERAPY 1/> REGIONS: CPT | Performed by: PHYSICAL THERAPIST

## 2022-09-20 PROCEDURE — 97110 THERAPEUTIC EXERCISES: CPT | Performed by: PHYSICAL THERAPIST

## 2022-09-20 PROCEDURE — 97016 VASOPNEUMATIC DEVICE THERAPY: CPT | Performed by: PHYSICAL THERAPIST

## 2022-09-20 NOTE — FLOWSHEET NOTE
[facility],  [address] [place]  Phone: [phone]  Fax [phone]  :  Physical Therapy Treatment Note/ Progress Report:           Date:  2022    Patient Name:  Darvin Luong    :  1955  MRN: [id number]      Referring Physician: Miguel Wilson MD                                                     Evaluation Date: 2022                                           Date of Referral:22     Insurance: Medicare     Next MD appt: scheduled:9/15/22     Medical Diagnosis:  M88.323 (ICD-10-CM) - S/P total knee arthroplasty, left        Treatment Diagnosis:  M25.662 L knee stiffness; M25.462 L knee effusion;  M25.562 L knee pain; difficulty walking R26.2  S/P L knee TKA 22           Precautions/ Contra-indications:         Has the plan of care been signed (Y/N):        [x]  Yes  []  No    Progress report will be due (10 Rx or 30 days ):   Visit 10 or 22 DONE visit 5 22  DUE visit 15 81/44/30      Recertification will be due (POC Duration  / 90 days ): 22     Is this a Progress Report:     []  Yes  [x]  No      If Yes:  Date Range for reporting period:  Beginnin22  Ending:              Visit # Date Range Insurance Allowable Requires auth   IN PERSON 7 22- BMN    [x]no        []yes:   TELEHEALTH       TOTAL              CX/NS       Next PT appt: 22                PT Practice Pattern:H  Co morbidities:1-2  Surgical:L TKA  DOS 22  Nonsurgical  INITIAL VISIT 22 CURRENT VISIT 22   PAIN 0-10/10 4-8/10 4-5/10   FUNCTIONAL SCALE FOTO 18/100 ROM KE -5  KF 45 92 PROM heavy OP                           STRENGHT (MMT) Quad tone trace Poor                                           Latex Allergy/Pacemaker/Adhesive allergy:  [x]NO      []YES     RESTRICTIONS/PRECAUTIONS: protocol         SUBJECTIVE:  Pt saw the doctor today and is permitted to use the cane at home per her report. (See assessment). She walked     Pain level:  4-5/10    OBJECTIVE:   Observation: bruising below knee, tight distal ITB and VL, very TTP in these areas as well. Dressings removed and incision healing well, no signs of infection, closed. Continued poor quad tone and difficulty w flexion            Exercises/Interventions:     HEP:  [employer]  Access Code: [email or web address]  URL: [email or web address]  Date: 08/31/2022  Prepared by: Romero Jimenez      Therapeutic Ex (99998)  NMR re-education (57131) Reps/Sets/time Notes/CUES/Assessment HEP     x     x               x          x     x          x    x   Bike partial rev 6'                 Sit to stand neutral  Sit to stand L posterior 15x  15x VC    VC          Gait training w SPC X10 ft  5' education Pt unsteady, not to use cane    Gait w RW x25ft Hell strike                            Pt education x8' How to perform KF w/o hiking hip or substituting , the need to PUSH ROM at this time, not using heat on her knee at night          Manual Intervention (70042)      Post tib mobs GII, PROM on SB, off bed, supine and modified Neville  x15'           STM VL, ITB, lat HS  Manual patellar mobs all dir  Manual HS and ITB stretch  PROM KF supine and seated w HS deactivation x15'                                         Therapeutic Activity (73332)                                                Therapeutic Exercise and NMR EXR  [x] (90595) Provided verbal/tactile cueing for activities related to strengthening, flexibility, endurance, ROM for improvements in LE, proximal hip, and core control with self care, mobility, lifting, ambulation. [x] (20450) Provided verbal/tactile cueing for activities related to improving balance, coordination, kinesthetic sense, posture, motor skill, proprioception  to assist with LE, proximal hip, and core control in self care, mobility, lifting, ambulation and eccentric single leg control.      NMR and Therapeutic Activities:    [x] (53296 or 94221) Provided verbal/tactile cueing for activities related to improving balance, coordination, kinesthetic sense, posture, motor skill, proprioception and motor activation to allow for proper function of core, proximal hip and LE with self care and ADLs  [x] (81650) Gait Re-education- Provided training and instruction to the patient for proper LE, core and proximal hip recruitment and positioning and eccentric body weight control with ambulation re-education including up and down stairs     Home Exercise Program:    [x] (83424) Reviewed/Progressed HEP activities related to strengthening, flexibility, endurance, ROM of core, proximal hip and LE for functional self-care, mobility, lifting and ambulation/stair navigation   [] (78678)Reviewed/Progressed HEP activities related to improving balance, coordination, kinesthetic sense, posture, motor skill, proprioception of core, proximal hip and LE for self care, mobility, lifting, and ambulation/stair navigation      Manual Treatments:  PROM / STM / Oscillations-Mobs:  G-I, II, III, IV (PA's, Inf., Post.)  [x] (31732) Provided manual therapy to mobilize LE, proximal hip and/or LS spine soft tissue/joints for the purpose of modulating pain, promoting relaxation,  increasing ROM, reducing/eliminating soft tissue swelling/inflammation/restriction, improving soft tissue extensibility and allowing for proper ROM for normal function with self care, mobility, lifting and ambulation. Trigger point Dry Needling:  fine needle insertion into myofascial trigger points to stimulate a healing response  [] (23722) Needle insertion without injection: 1 or 2 muscles  [] (00949) Needle insertion without injection: 3 or more muscles    Modalities:  Pt demonstrates edema and swelling that does not respond to regular conventional RICE and requires use of a compression/vasopneumatic device to reduce. [x] GAME READY (VASO)- for significant edema, swelling, pain control.  15'   [] ESU (HV/PM) for edema and pain control      Charges:  Timed Code Treatment Minutes: 45   Total Treatment Minutes: 60     Medicare total (add KX at $2000)  700        [] EVAL (LOW) 42879 (typically 20 minutes face-to-face)  [] EVAL (MOD) 86793 (typically 30 minutes face-to-face)  [] EVAL (HIGH) 33477 (typically 45 minutes face-to-face)  [] RE-EVAL     [x] CN(12176) x  1  [] IONTO  [] NMR (97170) x     [x] VASO  [x] Manual (53408) x  2 [] Other:  [] TA x      [] Mech Traction (76435)  [] ES(attended) (51659)     [] ES (un) (29907):             GOALS:      Patient stated goal: to be able to walk   [x] Progressing: [] Met: [] Not Met: [] Adjusted     Therapist goals for Patient:     Short Term Goals: To be achieved in: 2 weeks 2/2 MET    1. Independent in HEP and progression per patient tolerance, in order to prevent re-injury. [] Progressing: [x] Met: [] Not Met: [] Adjusted  2. Patient will have a decrease in pain to facilitate improvement in movement, function, and ADLs as indicated by Functional Deficits. [] Progressing: [x] Met: [] Not Met: [] Adjusted     Long Term Goals: To be achieved in: 12 weeks        1. FOTO score to be equal or greater to the predicted score (57/100)  to assist with reaching prior level of function. [x] Progressing: [] Met: [] Not Met: [] Adjusted      2. Patient will demonstrate increased AROM to 0-120 to allow for getting in and out of car as indicated by patients Functional Deficits. [x] Progressing: [] Met: [] Not Met: [] Adjusted       3. Patient will demonstrate an increase in strength to 4+/5 to allow for sit to stand as indicated by patients Functional Deficits. [x] Progressing: [] Met: [] Not Met: [] Adjusted       4. Patient will return to all transfers, work activities, and functional activities without increased symptoms or restriction, specifically deep water aerobics. [x] Progressing: [] Met: [] Not Met: [] Adjusted       5.  (Pt specific functional or activity goal)Pt will be able to walk for 20 minutes w/o walker and w/o increased s/s for grocery shopping. [x] Progressing: [] Met: [] Not Met: [] Adjusted  6. Pt will demonstrate pain decreased by 50%  (0-4/10) with all ADLS. [x] Progressing: [] Met: [] Not Met: [] Adjusted        ASSESSMENT: Pt cont to compensate w therex and stretch to avoid KF. She admit to not pushing herself too much at home. We did discuss the need to push her ROM into pain at this point as it has not increased over the past 2-4 visits and we are in crucial time for motion. Patient will benefit from continued skilled intervention to increase ROM, flexibility, strength and function. Overall Progression Towards Functional goals/ Treatment Progress Update:  [] Patient is progressing as expected towards functional goals listed. [x] Progression is slowed due to complexities/Impairments listed. [] Progression has been slowed due to co-morbidities. [] Plan just implemented, too soon to assess goals progression <30days   [] Goals require adjustment due to lack of progress  [] Patient is not progressing as expected and requires additional follow up with physician  [] Other    Prognosis for POC: [x] Good [] Fair  [] Poor      Patient requires continued skilled intervention: [x] Yes  [] No    Treatment/Activity Tolerance:  [x] Patient able to complete treatment  [] Patient limited by fatigue  [x] Patient limited by pain    [] Patient limited by other medical complications  [] Other:             PLAN: Cont. PT 2x/week. Focus on ROM and strength, balance ans well as gait. [x] Continue per plan of care [] Alter current plan (see comments above)  [] Plan of care initiated [] Hold pending MD visit [] Discharge      Electronically signed by:  Liberty Leblanc, PT, [id number]        Note: If patient does not return for scheduled/ recommended follow up visits, this note will serve as a discharge from care along with most recent update on progress.

## 2022-09-20 NOTE — CARE COORDINATION
Called patient for updates. Left message for return call.   Shayla Somers BSN  Care Transition Nurse for [facility]  [phone]

## 2022-09-22 ENCOUNTER — HOSPITAL ENCOUNTER (OUTPATIENT)
Dept: PHYSICAL THERAPY | Age: 67
Setting detail: THERAPIES SERIES
Discharge: HOME | End: 2022-09-22
Payer: MEDICARE

## 2022-09-22 PROCEDURE — 97016 VASOPNEUMATIC DEVICE THERAPY: CPT | Performed by: PHYSICAL THERAPIST

## 2022-09-22 PROCEDURE — 97110 THERAPEUTIC EXERCISES: CPT | Performed by: PHYSICAL THERAPIST

## 2022-09-22 PROCEDURE — 97140 MANUAL THERAPY 1/> REGIONS: CPT | Performed by: PHYSICAL THERAPIST

## 2022-09-22 NOTE — FLOWSHEET NOTE
feel like her knee is more normal.    Pain level:  4-5/10    OBJECTIVE:   Observation: good patellar mobility this visit. Cont tightness in flexion. Exercises/Interventions:     HEP:  [employer]  Access Code: [email or web address]  URL: [email or web address]  Date: 08/31/2022  Prepared by: Marcela Webster      Therapeutic Ex (10102)  NMR re-education (04023) Reps/Sets/time Notes/CUES/Assessment HEP     x     x               x          x     x          x    x   Bike partial rev for ROM 6'                 Sit to stand neutral  Sit to stand L posterior 15x  15x VC    VC          Gait training w SPC X10 ft  5' education     Squats w HHS 15x3\"     Stair KF stretch 10x10\"     Stair HS stretch LLE 4x15\"     Incline G stretch BLEs 4x15\"           Pt education x8' How to perform KF w/o hiking hip or substituting , the need to PUSH ROM at this time, not using heat on her knee at night          Manual Intervention (04799)      Post tib mobs GII, PROM on SB, off bed, supine and modified Neville  8'           STM VL, ITB, lat HS  Manual patellar mobs all dir  5'                                         Therapeutic Activity (05469)                                                Therapeutic Exercise and NMR EXR  [x] (38109) Provided verbal/tactile cueing for activities related to strengthening, flexibility, endurance, ROM for improvements in LE, proximal hip, and core control with self care, mobility, lifting, ambulation. [x] (20255) Provided verbal/tactile cueing for activities related to improving balance, coordination, kinesthetic sense, posture, motor skill, proprioception  to assist with LE, proximal hip, and core control in self care, mobility, lifting, ambulation and eccentric single leg control.      NMR and Therapeutic Activities:    [x] (16471 or 55065) Provided verbal/tactile cueing for activities related to improving balance, coordination, kinesthetic sense, posture, motor skill, proprioception and motor activation to 60953 (typically 20 minutes face-to-face)  [] EVAL (MOD) 88120 (typically 30 minutes face-to-face)  [] EVAL (HIGH) 17082 (typically 45 minutes face-to-face)  [] RE-EVAL     [x] AR(20501) x  2  [] IONTO  [] NMR (38045) x     [x] VASO  [x] Manual (02756) x  1 [] Other:  [] TA x      [] Mech Traction (95193)  [] ES(attended) (91082)     [] ES (un) (98791):             GOALS:      Patient stated goal: to be able to walk   [x] Progressing: [] Met: [] Not Met: [] Adjusted     Therapist goals for Patient:     Short Term Goals: To be achieved in: 2 weeks 2/2 MET    1. Independent in HEP and progression per patient tolerance, in order to prevent re-injury. [] Progressing: [x] Met: [] Not Met: [] Adjusted  2. Patient will have a decrease in pain to facilitate improvement in movement, function, and ADLs as indicated by Functional Deficits. [] Progressing: [x] Met: [] Not Met: [] Adjusted     Long Term Goals: To be achieved in: 12 weeks        1. FOTO score to be equal or greater to the predicted score (57/100)  to assist with reaching prior level of function. [x] Progressing: [] Met: [] Not Met: [] Adjusted      2. Patient will demonstrate increased AROM to 0-120 to allow for getting in and out of car as indicated by patients Functional Deficits. [x] Progressing: [] Met: [] Not Met: [] Adjusted       3. Patient will demonstrate an increase in strength to 4+/5 to allow for sit to stand as indicated by patients Functional Deficits. [x] Progressing: [] Met: [] Not Met: [] Adjusted       4. Patient will return to all transfers, work activities, and functional activities without increased symptoms or restriction, specifically deep water aerobics. [x] Progressing: [] Met: [] Not Met: [] Adjusted       5. (Pt specific functional or activity goal)Pt will be able to walk for 20 minutes w/o walker and w/o increased s/s for grocery shopping. [x] Progressing: [] Met: [] Not Met: [] Adjusted  6.  Pt will demonstrate pain decreased by 50%  (0-4/10) with all ADLS. [x] Progressing: [] Met: [] Not Met: [] Adjusted        ASSESSMENT: Pt was able to ambulate w SPC today in clinic with fair to good gait pattern and good safety. She is able to use the cane at  now, still walker out of the home. Patient will benefit from continued skilled intervention to increase ROM, flexibility, strength and function. Overall Progression Towards Functional goals/ Treatment Progress Update:  [] Patient is progressing as expected towards functional goals listed. [x] Progression is slowed due to complexities/Impairments listed. [] Progression has been slowed due to co-morbidities. [] Plan just implemented, too soon to assess goals progression <30days   [] Goals require adjustment due to lack of progress  [] Patient is not progressing as expected and requires additional follow up with physician  [] Other    Prognosis for POC: [x] Good [] Fair  [] Poor      Patient requires continued skilled intervention: [x] Yes  [] No    Treatment/Activity Tolerance:  [x] Patient able to complete treatment  [] Patient limited by fatigue  [x] Patient limited by pain    [] Patient limited by other medical complications  [] Other:             PLAN: Cont. PT 2x/week. Focus on ROM and strength, balance ans well as gait. [x] Continue per plan of care [] Alter current plan (see comments above)  [] Plan of care initiated [] Hold pending MD visit [] Discharge      Electronically signed by:  Hunter Cartagena PT, [id number]        Note: If patient does not return for scheduled/ recommended follow up visits, this note will serve as a discharge from care along with most recent update on progress.

## 2022-09-27 ENCOUNTER — HOSPITAL ENCOUNTER (OUTPATIENT)
Dept: PHYSICAL THERAPY | Age: 67
Setting detail: THERAPIES SERIES
Discharge: HOME | End: 2022-09-27
Payer: MEDICARE

## 2022-09-27 ENCOUNTER — CARE COORDINATION (OUTPATIENT)
Dept: CASE MANAGEMENT | Age: 67
End: 2022-09-27

## 2022-09-27 PROCEDURE — 97140 MANUAL THERAPY 1/> REGIONS: CPT | Performed by: PHYSICAL THERAPIST

## 2022-09-27 PROCEDURE — 97016 VASOPNEUMATIC DEVICE THERAPY: CPT | Performed by: PHYSICAL THERAPIST

## 2022-09-27 PROCEDURE — 97110 THERAPEUTIC EXERCISES: CPT | Performed by: PHYSICAL THERAPIST

## 2022-09-27 NOTE — CARE COORDINATION
Spoke with patient. Incision status: States free from redness or drainage. Edema/Swelling: States swelling has improved. Pain level and status: States pain is tolerable. Use of pain medications: States taking meloxicam and needs a refill. Notified Dr. Marcy Hadley office and taking tylenol. Bowels: No complaints. Outpatient therapy: Continues. Do you have all of your medications: Yes    Changes in medications: No    Follow up appointments:    Future Appointments   Date Time Provider Cris Oates   9/29/2022 10:15 AM Hardik Louie, PT [facility] AND PT [facility]   10/4/2022 11:45 AM Hardik Louie, PT [facility] AND PT [facility]   10/6/2022  9:15 AM Joie Bella, PT [facility] AND PT [facility]   10/6/2022 10:15 AM Praveen Patino MD [facility]   10/11/2022 11:45 AM Hardik Louie, PT [place] AND [facility] [place]   10/13/2022 10:15 AM Hardik Louie, PT [facility] AND PT [facility]   10/18/2022 11:45 AM Hardik Louie, PT [facility] AND PT [facility]   10/20/2022 10:15 AM Hardik Louie, PT [facility] AND PT [facility]     .

## 2022-09-27 NOTE — FLOWSHEET NOTE
[facility], [address] [place]  Phone: [phone]  Fax [phone]  :  Physical Therapy Treatment Note/ Progress Report:           Date:  2022    Patient Name:  Keyshawn Marroquin    :  1955  MRN: [id number]      Referring Physician: Gracy Javier MD                                                     Evaluation Date: 2022                                           Date of Referral:22     Insurance: Medicare     Next MD appt: scheduled:9/15/22     Medical Diagnosis:  U22.219 (ICD-10-CM) - S/P total knee arthroplasty, left        Treatment Diagnosis:  M25.662 L knee stiffness; M25.462 L knee effusion;  M25.562 L knee pain; difficulty walking R26.2  S/P L knee TKA 22           Precautions/ Contra-indications:         Has the plan of care been signed (Y/N):        [x]  Yes  []  No    Progress report will be due (10 Rx or 30 days ):   Visit 10 or 22 DONE visit 5 22  DUE visit 15       Recertification will be due (POC Duration  / 90 days ): 22     Is this a Progress Report:     []  Yes  [x]  No      If Yes:  Date Range for reporting period:  Beginnin22  Ending:              Visit # Date Range Insurance Allowable Requires auth   IN PERSON 9 22- BMN    [x]no        []yes:   TELEHEALTH       TOTAL              CX/NS       Next PT appt: 22                PT Practice Pattern:H  Co morbidities:1-2  Surgical:L TKA  DOS 22  Nonsurgical  INITIAL VISIT 22 CURRENT VISIT 22   PAIN 0-10/10 4-8/10 4-5/10   FUNCTIONAL SCALE FOTO 18/100 ROM KE -5 0    KF 45 92 PROM on reformer  94 on bike                           STRENGHT (MMT) Quad tone trace Poor                                           Latex Allergy/Pacemaker/Adhesive allergy:  [x]NO      []YES     RESTRICTIONS/PRECAUTIONS: protocol         SUBJECTIVE:  Has been doing her HEP 3x a day.   Feels better at night self care and ADLs  [x] (09405) Gait Re-education- Provided training and instruction to the patient for proper LE, core and proximal hip recruitment and positioning and eccentric body weight control with ambulation re-education including up and down stairs     Home Exercise Program:    [x] (85100) Reviewed/Progressed HEP activities related to strengthening, flexibility, endurance, ROM of core, proximal hip and LE for functional self-care, mobility, lifting and ambulation/stair navigation   [] (43829)Reviewed/Progressed HEP activities related to improving balance, coordination, kinesthetic sense, posture, motor skill, proprioception of core, proximal hip and LE for self care, mobility, lifting, and ambulation/stair navigation      Manual Treatments:  PROM / STM / Oscillations-Mobs:  G-I, II, III, IV (PA's, Inf., Post.)  [x] (78518) Provided manual therapy to mobilize LE, proximal hip and/or LS spine soft tissue/joints for the purpose of modulating pain, promoting relaxation,  increasing ROM, reducing/eliminating soft tissue swelling/inflammation/restriction, improving soft tissue extensibility and allowing for proper ROM for normal function with self care, mobility, lifting and ambulation. Trigger point Dry Needling:  fine needle insertion into myofascial trigger points to stimulate a healing response  [] (78172) Needle insertion without injection: 1 or 2 muscles  [] (63046) Needle insertion without injection: 3 or more muscles    Modalities:  Pt demonstrates edema and swelling that does not respond to regular conventional RICE and requires use of a compression/vasopneumatic device to reduce. [x] GAME READY (VASO)- for significant edema, swelling, pain control.  10'   [] ESU (HV/PM) for edema and pain control      Charges:  Timed Code Treatment Minutes: 45   Total Treatment Minutes: 55     Medicare total (add KX at $2000)  900        [] EVAL (LOW) [address] (typically 20 minutes face-to-face)  [] EVAL (MOD) [address]278 (typically 30 minutes face-to-face)  [] EVAL (HIGH) 05990 (typically 45 minutes face-to-face)  [] RE-EVAL     [x] GB(28498) x  2  [] IONTO  [] NMR (86629) x     [x] VASO  [x] Manual (64804) x  1 [] Other:  [] TA x      [] Mech Traction (05379)  [] ES(attended) (22528)     [] ES (un) (13398):             GOALS:      Patient stated goal: to be able to walk   [x] Progressing: [] Met: [] Not Met: [] Adjusted     Therapist goals for Patient:     Short Term Goals: To be achieved in: 2 weeks 2/2 MET    1. Independent in HEP and progression per patient tolerance, in order to prevent re-injury. [] Progressing: [x] Met: [] Not Met: [] Adjusted  2. Patient will have a decrease in pain to facilitate improvement in movement, function, and ADLs as indicated by Functional Deficits. [] Progressing: [x] Met: [] Not Met: [] Adjusted     Long Term Goals: To be achieved in: 12 weeks        1. FOTO score to be equal or greater to the predicted score (57/100)  to assist with reaching prior level of function. [x] Progressing: [] Met: [] Not Met: [] Adjusted      2. Patient will demonstrate increased AROM to 0-120 to allow for getting in and out of car as indicated by patients Functional Deficits. [x] Progressing: [] Met: [] Not Met: [] Adjusted       3. Patient will demonstrate an increase in strength to 4+/5 to allow for sit to stand as indicated by patients Functional Deficits. [x] Progressing: [] Met: [] Not Met: [] Adjusted       4. Patient will return to all transfers, work activities, and functional activities without increased symptoms or restriction, specifically deep water aerobics. [x] Progressing: [] Met: [] Not Met: [] Adjusted       5. (Pt specific functional or activity goal)Pt will be able to walk for 20 minutes w/o walker and w/o increased s/s for grocery shopping. [x] Progressing: [] Met: [] Not Met: [] Adjusted  6. Pt will demonstrate pain decreased by 50%  (0-4/10) with all ADLS.   [x] Progressing: [] Met: [] Not Met: [] Adjusted        ASSESSMENT: Pt cont w difficulty and fatigue completing reps of quad exercises properly. Able to perform well w cuing however. Patient will benefit from continued skilled intervention to increase ROM, flexibility, strength and function. Overall Progression Towards Functional goals/ Treatment Progress Update:  [] Patient is progressing as expected towards functional goals listed. [x] Progression is slowed due to complexities/Impairments listed. [] Progression has been slowed due to co-morbidities. [] Plan just implemented, too soon to assess goals progression <30days   [] Goals require adjustment due to lack of progress  [] Patient is not progressing as expected and requires additional follow up with physician  [] Other    Prognosis for POC: [x] Good [] Fair  [] Poor      Patient requires continued skilled intervention: [x] Yes  [] No    Treatment/Activity Tolerance:  [x] Patient able to complete treatment  [] Patient limited by fatigue  [x] Patient limited by pain    [] Patient limited by other medical complications  [] Other:             PLAN: Cont. PT 2x/week. Focus on ROM and strength, balance ans well as gait. [x] Continue per plan of care [] Alter current plan (see comments above)  [] Plan of care initiated [] Hold pending MD visit [] Discharge      Electronically signed by:  Bianca Preston, PT, [id number]        Note: If patient does not return for scheduled/ recommended follow up visits, this note will serve as a discharge from care along with most recent update on progress.

## 2022-09-28 RX ORDER — MELOXICAM 15 MG/1
15 TABLET ORAL DAILY
Qty: 30 TABLET | Refills: 0 | Status: SHIPPED | OUTPATIENT
Start: 2022-09-28 | End: 2022-10-27

## 2022-09-29 ENCOUNTER — HOSPITAL ENCOUNTER (OUTPATIENT)
Dept: PHYSICAL THERAPY | Age: 67
Setting detail: THERAPIES SERIES
Discharge: HOME | End: 2022-09-29
Payer: MEDICARE

## 2022-09-29 PROCEDURE — 97016 VASOPNEUMATIC DEVICE THERAPY: CPT | Performed by: PHYSICAL THERAPIST

## 2022-09-29 PROCEDURE — 97140 MANUAL THERAPY 1/> REGIONS: CPT | Performed by: PHYSICAL THERAPIST

## 2022-09-29 PROCEDURE — 97110 THERAPEUTIC EXERCISES: CPT | Performed by: PHYSICAL THERAPIST

## 2022-09-29 PROCEDURE — 97112 NEUROMUSCULAR REEDUCATION: CPT | Performed by: PHYSICAL THERAPIST

## 2022-09-29 NOTE — FLOWSHEET NOTE
[facility], [address] [place]  Phone: [phone]  Fax [phone]  :  Physical Therapy Treatment Note/ Progress Report:           Date:  2022    Patient Name:  Tristan Siu    :  1955  MRN: [id number]      Referring Physician: Candi Prieto MD                                                     Evaluation Date: 2022                                           Date of Referral:22     Insurance: Medicare     Next MD appt: scheduled:9/15/22     Medical Diagnosis:  L20.312 (ICD-10-CM) - S/P total knee arthroplasty, left        Treatment Diagnosis:  M25.662 L knee stiffness; M25.462 L knee effusion;  M25.562 L knee pain; difficulty walking R26.2  S/P L knee TKA 22           Precautions/ Contra-indications:         Has the plan of care been signed (Y/N):        [x]  Yes  []  No    Progress report will be due (10 Rx or 30 days ):   Visit 10 or 22 DONE visit 5 22  DUE visit 15       Recertification will be due (POC Duration  / 90 days ): 22     Is this a Progress Report:     []  Yes  [x]  No      If Yes:  Date Range for reporting period:  Beginnin22  Ending:              Visit # Date Range Insurance Allowable Requires auth   IN PERSON 10 22- BMN    [x]no        []yes:   TELEHEALTH       TOTAL              CX/NS       Next PT appt: 10/4/22                PT Practice Pattern:H  Co morbidities:1-2  Surgical:L TKA  DOS 22  Nonsurgical  INITIAL VISIT 22 CURRENT VISIT 22   PAIN 0-10/10 4-8/10 4-5/10   FUNCTIONAL SCALE FOTO 18/100 ROM KE -5 0    KF 45 92 PROM on reformer  94 on bike                           STRENGHT (MMT) Quad tone trace Poor                                           Latex Allergy/Pacemaker/Adhesive allergy:  [x]NO      []YES     RESTRICTIONS/PRECAUTIONS: protocol         SUBJECTIVE:  Pt reports stiffness this morning but no increased pain.    Pain level:  3/10    OBJECTIVE:   Observation: superior patellar mobility hypomobile. TP and tight distal VL. Stiff KF. KF ROM on reformer 9/29/22 97 degrees          Exercises/Interventions:     HEP:  Francisca  Access Code: [email or web address]  URL: [email or web address]  Date: 08/31/2022  Prepared by: Linda Neri      Therapeutic Ex (69003)  NMR re-education (22538) Reps/Sets/time Notes/CUES/Assessment HEP     x     x               x          x     x         Bridges w ADD full ROM 16x3\" HS cramp x    x   Bike partial rev for ROM 6'               Sit to stand neutral chair  Sit to stand L posterior chair 6x  6x VC    VC                  Stair KF stretch 10x10\"         LLE step up tap and back, close to step 4\" 12x     LLE step up and lunge close to step 6\" 15x     RLE lunge on step for LLE QS/GS 6\" 10x           Incline G stretch BLEs 4x20\"     Reformer  BLE squats no heel raise  BHR w QS/GS 2R  3'  15x VC/MC    Pt education x5' HEP review          Manual Intervention ([phone])      Post tib mobs GII, P  3'           STM VL, ITB, lat HS  Manual patellar mobs all dir  Manual HS and ITB stretch  PROM KF supine and seated w HS deactivation 10'                                         Therapeutic Activity (13821)                                                Therapeutic Exercise and NMR EXR  [x] (50482) Provided verbal/tactile cueing for activities related to strengthening, flexibility, endurance, ROM for improvements in LE, proximal hip, and core control with self care, mobility, lifting, ambulation. [x] (76665) Provided verbal/tactile cueing for activities related to improving balance, coordination, kinesthetic sense, posture, motor skill, proprioception  to assist with LE, proximal hip, and core control in self care, mobility, lifting, ambulation and eccentric single leg control.      NMR and Therapeutic Activities:    [x] (51937 or 75666) Provided verbal/tactile cueing for activities related to improving balance, coordination, kinesthetic sense, posture, motor skill, proprioception and motor activation to allow for proper function of core, proximal hip and LE with self care and ADLs  [x] (86896) Gait Re-education- Provided training and instruction to the patient for proper LE, core and proximal hip recruitment and positioning and eccentric body weight control with ambulation re-education including up and down stairs     Home Exercise Program:    [x] (19551) Reviewed/Progressed HEP activities related to strengthening, flexibility, endurance, ROM of core, proximal hip and LE for functional self-care, mobility, lifting and ambulation/stair navigation   [] (95705)Reviewed/Progressed HEP activities related to improving balance, coordination, kinesthetic sense, posture, motor skill, proprioception of core, proximal hip and LE for self care, mobility, lifting, and ambulation/stair navigation      Manual Treatments:  PROM / STM / Oscillations-Mobs:  G-I, II, III, IV (PA's, Inf., Post.)  [x] (75793) Provided manual therapy to mobilize LE, proximal hip and/or LS spine soft tissue/joints for the purpose of modulating pain, promoting relaxation,  increasing ROM, reducing/eliminating soft tissue swelling/inflammation/restriction, improving soft tissue extensibility and allowing for proper ROM for normal function with self care, mobility, lifting and ambulation. Trigger point Dry Needling:  fine needle insertion into myofascial trigger points to stimulate a healing response  [] (15339) Needle insertion without injection: 1 or 2 muscles  [] (44023) Needle insertion without injection: 3 or more muscles    Modalities:  Pt demonstrates edema and swelling that does not respond to regular conventional RICE and requires use of a compression/vasopneumatic device to reduce. [x] GAME READY (VASO)- for significant edema, swelling, pain control.  10'   [] ESU (HV/PM) for edema and pain control      Charges:  Timed Code Treatment Minutes: 38   Total Treatment Minutes: 53     Medicare total (add KX at $2000)  1000        [] EVAL (LOW) 92005 (typically 20 minutes face-to-face)  [] EVAL (MOD) 46717 (typically 30 minutes face-to-face)  [] EVAL (HIGH) 49962 (typically 45 minutes face-to-face)  [] RE-EVAL     [x] QQ(35696) x  1  [] IONTO  [x] NMR (52152) x  1   [x] VASO  [x] Manual (18297) x  1 [] Other:  [] TA x      [] Mech Traction (48421)  [] ES(attended) (44280)     [] ES (un) (58879):             GOALS:      Patient stated goal: to be able to walk   [x] Progressing: [] Met: [] Not Met: [] Adjusted     Therapist goals for Patient:     Short Term Goals: To be achieved in: 2 weeks 2/2 MET    1. Independent in HEP and progression per patient tolerance, in order to prevent re-injury. [] Progressing: [x] Met: [] Not Met: [] Adjusted  2. Patient will have a decrease in pain to facilitate improvement in movement, function, and ADLs as indicated by Functional Deficits. [] Progressing: [x] Met: [] Not Met: [] Adjusted     Long Term Goals: To be achieved in: 12 weeks        1. FOTO score to be equal or greater to the predicted score (57/100)  to assist with reaching prior level of function. [x] Progressing: [] Met: [] Not Met: [] Adjusted      2. Patient will demonstrate increased AROM to 0-120 to allow for getting in and out of car as indicated by patients Functional Deficits. [x] Progressing: [] Met: [] Not Met: [] Adjusted       3. Patient will demonstrate an increase in strength to 4+/5 to allow for sit to stand as indicated by patients Functional Deficits. [x] Progressing: [] Met: [] Not Met: [] Adjusted       4. Patient will return to all transfers, work activities, and functional activities without increased symptoms or restriction, specifically deep water aerobics. [x] Progressing: [] Met: [] Not Met: [] Adjusted       5.  (Pt specific functional or activity goal)Pt will be able to walk for 20 minutes w/o walker and w/o increased s/s for grocery shopping. [x] Progressing: [] Met: [] Not Met: [] Adjusted  6. Pt will demonstrate pain decreased by 50%  (0-4/10) with all ADLS. [x] Progressing: [] Met: [] Not Met: [] Adjusted        ASSESSMENT: Pt cont w difficulty and fatigue completing reps of quad exercises properly. Able to perform well w cuing however. KF ROM increasing. Gait w SPC improved. Patient will benefit from continued skilled intervention to increase ROM, flexibility, strength and function. Overall Progression Towards Functional goals/ Treatment Progress Update:  [] Patient is progressing as expected towards functional goals listed. [x] Progression is slowed due to complexities/Impairments listed. [] Progression has been slowed due to co-morbidities. [] Plan just implemented, too soon to assess goals progression <30days   [] Goals require adjustment due to lack of progress  [] Patient is not progressing as expected and requires additional follow up with physician  [] Other    Prognosis for POC: [x] Good [] Fair  [] Poor      Patient requires continued skilled intervention: [x] Yes  [] No    Treatment/Activity Tolerance:  [x] Patient able to complete treatment  [] Patient limited by fatigue  [x] Patient limited by pain    [] Patient limited by other medical complications  [] Other:             PLAN: Cont. PT 2x/week. Focus on ROM and strength, balance ans well as gait. [x] Continue per plan of care [] Alter current plan (see comments above)  [] Plan of care initiated [] Hold pending MD visit [] Discharge      Electronically signed by:  Farooq Rea, PT, [id number]        Note: If patient does not return for scheduled/ recommended follow up visits, this note will serve as a discharge from care along with most recent update on progress.

## 2022-10-04 ENCOUNTER — CARE COORDINATION (OUTPATIENT)
Dept: CASE MANAGEMENT | Age: 67
End: 2022-10-04

## 2022-10-04 ENCOUNTER — HOSPITAL ENCOUNTER (OUTPATIENT)
Dept: PHYSICAL THERAPY | Age: 67
Setting detail: THERAPIES SERIES
Discharge: HOME | End: 2022-10-04
Payer: MEDICARE

## 2022-10-04 PROCEDURE — 97016 VASOPNEUMATIC DEVICE THERAPY: CPT | Performed by: PHYSICAL THERAPIST

## 2022-10-04 PROCEDURE — 97140 MANUAL THERAPY 1/> REGIONS: CPT | Performed by: PHYSICAL THERAPIST

## 2022-10-04 PROCEDURE — 97112 NEUROMUSCULAR REEDUCATION: CPT | Performed by: PHYSICAL THERAPIST

## 2022-10-04 PROCEDURE — 97110 THERAPEUTIC EXERCISES: CPT | Performed by: PHYSICAL THERAPIST

## 2022-10-04 NOTE — CARE COORDINATION
Spoke with patient. Incision status: States free from redness or drainage. Edema/Swelling: States swelling has improved. Pain level and status: States pain is tolerable. Bowels: No complaints. Outpatient therapy: Continues. States that flexion was 97 degrees today. Continues to do therapy at home, and outpatient twice a week.     Do you have all of your medications: Yes    Changes in medications: No    Follow up appointments:    Future Appointments   Date Time Provider Cris Oates   10/6/2022  9:15 AM Romi Hathaway, PT [facility] AND PT Marli POOLE   10/6/2022 10:15 AM Kvng Ballard MD AND [facility]   10/11/2022 11:45 AM Mono Faustin, PT [facility] AND PT Shoshana James   10/13/2022 10:15 AM Mono Faustin, PT [facility] AND PT Shoshana James   10/18/2022 11:45 AM Mono Faustin, PT [facility] AND PT Shoshana James   10/20/2022 10:15 AM Mono Faustin, PT [address] [place]  Care Transition Nurse for [facility]  [phone]

## 2022-10-04 NOTE — FLOWSHEET NOTE
[facility], [address] [place]  Phone: [phone]  Fax [phone]  :  Physical Therapy Treatment Note/ Progress Report:           Date:  10/4/2022    Patient Name:  Elbert Nelson    :  1955  MRN: [id number]      Referring Physician: Lea Mckinnon MD                                                     Evaluation Date: 2022                                           Date of Referral:22     Insurance: Medicare     Next MD appt: scheduled:9/15/22     Medical Diagnosis:  F14.402 (ICD-10-CM) - S/P total knee arthroplasty, left        Treatment Diagnosis:  M25.662 L knee stiffness; M25.462 L knee effusion;  M25.562 L knee pain; difficulty walking R26.2  S/P L knee TKA 22           Precautions/ Contra-indications:         Has the plan of care been signed (Y/N):        [x]  Yes  []  No    Progress report will be due (10 Rx or 30 days ):   Visit 10 or 22 DONE visit 5 22  DUE visit 15 66/50/61      Recertification will be due (POC Duration  / 90 days ): 22     Is this a Progress Report:     []  Yes  [x]  No      If Yes:  Date Range for reporting period:  Beginnin22  Ending:              Visit # Date Range Insurance Allowable Requires auth   IN PERSON 11 22- BMN    [x]no        []yes:   TELEHEALTH       TOTAL              CX/NS       Next PT appt: 10/6/22                PT Practice Pattern:H  Co morbidities:1-2  Surgical:L TKA  DOS 22  Nonsurgical  INITIAL VISIT 22 CURRENT VISIT 10/4/22   PAIN 0-10/10 4-8/10 4-5/10   FUNCTIONAL SCALE FOTO 18/100 44/100   ROM KE -5 0    KF 45 90                           STRENGHT (MMT) Quad tone trace Fair    KE  4+/5    KF  4+/5    HF  4+/5                         Latex Allergy/Pacemaker/Adhesive allergy:  [x]NO      []YES     RESTRICTIONS/PRECAUTIONS: protocol         SUBJECTIVE:  Knee is a little stiff today.     Pain level: proximal hip recruitment and positioning and eccentric body weight control with ambulation re-education including up and down stairs     Home Exercise Program:    [x] (17619) Reviewed/Progressed HEP activities related to strengthening, flexibility, endurance, ROM of core, proximal hip and LE for functional self-care, mobility, lifting and ambulation/stair navigation   [] (45696)Reviewed/Progressed HEP activities related to improving balance, coordination, kinesthetic sense, posture, motor skill, proprioception of core, proximal hip and LE for self care, mobility, lifting, and ambulation/stair navigation      Manual Treatments:  PROM / STM / Oscillations-Mobs:  G-I, II, III, IV (PA's, Inf., Post.)  [x] (40893) Provided manual therapy to mobilize LE, proximal hip and/or LS spine soft tissue/joints for the purpose of modulating pain, promoting relaxation,  increasing ROM, reducing/eliminating soft tissue swelling/inflammation/restriction, improving soft tissue extensibility and allowing for proper ROM for normal function with self care, mobility, lifting and ambulation. Trigger point Dry Needling:  fine needle insertion into myofascial trigger points to stimulate a healing response  [] (81401) Needle insertion without injection: 1 or 2 muscles  [] (59322) Needle insertion without injection: 3 or more muscles    Modalities:  Pt demonstrates edema and swelling that does not respond to regular conventional RICE and requires use of a compression/vasopneumatic device to reduce. [x] GAME READY (VASO)- for significant edema, swelling, pain control.  10'   [] ESU (HV/PM) for edema and pain control      Charges:  Timed Code Treatment Minutes: 40   Total Treatment Minutes: 50     Medicare total (add KX at $2000)  1100        [] EVAL (LOW) 58502 (typically 20 minutes face-to-face)  [] EVAL (MOD) 22300 (typically 30 minutes face-to-face)  [] EVAL (HIGH) 423 5573 (typically 45 minutes face-to-face)  [] RE-EVAL     [x] (40224) Able to perform well w cuing however. KF ROM is very stiff. Pt came off pain meds very quickly and it was difficulty to push ROM at the start of PT as well as she [employer] push at home. This is causing a lag in progress however she has progressed small amounts. overall . Gait w SPC improved. Patient will benefit from continued skilled intervention to increase ROM, flexibility, strength and function. Overall Progression Towards Functional goals/ Treatment Progress Update:  [] Patient is progressing as expected towards functional goals listed. [x] Progression is slowed due to complexities/Impairments listed. [] Progression has been slowed due to co-morbidities. [] Plan just implemented, too soon to assess goals progression <30days   [] Goals require adjustment due to lack of progress  [] Patient is not progressing as expected and requires additional follow up with physician  [] Other    Prognosis for POC: [x] Good [] Fair  [] Poor      Patient requires continued skilled intervention: [x] Yes  [] No    Treatment/Activity Tolerance:  [x] Patient able to complete treatment  [] Patient limited by fatigue  [x] Patient limited by pain    [] Patient limited by other medical complications  [] Other:             PLAN: Cont. PT 2x/week. Focus on ROM and strength, balance as well as gait. [x] Continue per plan of care [] Alter current plan (see comments above)  [] Plan of care initiated [] Hold pending MD visit [] Discharge      Electronically signed by:  Shahrzad Faustin PT, [id number]        Note: If patient does not return for scheduled/ recommended follow up visits, this note will serve as a discharge from care along with most recent update on progress.

## 2022-10-06 ENCOUNTER — OFFICE VISIT (OUTPATIENT)
Dept: ORTHOPEDIC SURGERY | Age: 67
End: 2022-10-06

## 2022-10-06 ENCOUNTER — HOSPITAL ENCOUNTER (OUTPATIENT)
Dept: PHYSICAL THERAPY | Age: 67
Setting detail: THERAPIES SERIES
Discharge: HOME | End: 2022-10-06
Payer: MEDICARE

## 2022-10-06 VITALS — BODY MASS INDEX: 29.88 KG/M2 | HEIGHT: 64 IN | WEIGHT: 175 LBS

## 2022-10-06 DIAGNOSIS — Z96.652 S/P TOTAL KNEE ARTHROPLASTY, LEFT: Primary | ICD-10-CM

## 2022-10-06 PROCEDURE — 97140 MANUAL THERAPY 1/> REGIONS: CPT | Performed by: PHYSICAL THERAPIST

## 2022-10-06 PROCEDURE — 97112 NEUROMUSCULAR REEDUCATION: CPT | Performed by: PHYSICAL THERAPIST

## 2022-10-06 PROCEDURE — 99024 POSTOP FOLLOW-UP VISIT: CPT | Performed by: ORTHOPAEDIC SURGERY

## 2022-10-06 NOTE — PROGRESS NOTES
attempts are made to edit the dictation for accuracy, there may be errors in the transcription that are not intended.

## 2022-10-06 NOTE — PROGRESS NOTES
[facility], [address] [place]  Phone: [phone]  Fax [phone]  :  Physical Therapy Treatment Note/ Progress Report:           Date:  10/6/2022    Patient Name:  Christal Byrd    :  1955  MRN: [id number]      Referring Physician: Irasema Vásquez MD                                                     Evaluation Date: 2022                                           Date of Referral:22     Insurance: Medicare     Next MD appt: scheduled:9/15/22     Medical Diagnosis:  Z41.321 (ICD-10-CM) - S/P total knee arthroplasty, left        Treatment Diagnosis:  M25.662 L knee stiffness; M25.462 L knee effusion;  M25.562 L knee pain; difficulty walking R26.2  S/P L knee TKA 22           Precautions/ Contra-indications:         Has the plan of care been signed (Y/N):        [x]  Yes  []  No    Progress report will be due (10 Rx or 30 days ):   Visit 10 or 22 DONE visit 5 22  DUE visit 15 93/37/81      Recertification will be due (POC Duration  / 90 days ): 22     Is this a Progress Report:     []  Yes  [x]  No      If Yes:  Date Range for reporting period:  Beginnin22  Ending:              Visit # Date Range Insurance Allowable Requires auth   IN PERSON 12 22- BMN    [x]no        []yes:   TELEHEALTH       TOTAL              CX/NS       Next PT appt: 10/6/22                PT Practice Pattern:H  Co morbidities:1-2  Surgical:L TKA  DOS 22  Nonsurgical  INITIAL VISIT 22 CURRENT VISIT 10/4/22   PAIN 0-10/10 4-8/10 4-5/10   FUNCTIONAL SCALE FOTO 18/100 44/100   ROM KE -5 0    KF 45 110 after manuals                           STRENGHT (MMT) Quad tone trace Fair    KE  4+/5    KF  4+/5    HF  4+/5                         Latex Allergy/Pacemaker/Adhesive allergy:  [x]NO      []YES     RESTRICTIONS/PRECAUTIONS: protocol         SUBJECTIVE:  Knee is a little stiff today.     Pain level:  3/10    OBJECTIVE:   Observation: patellar mobility good this visit. Tight and stiff in flexion        Exercises/Interventions:     HEP:  [employer]  Access Code: [email or web address]  URL: [email or web address]  Date: 08/31/2022  Prepared by: Vargas Almaraz      Therapeutic Ex (09197)  NMR re-education (33564) Reps/Sets/time Notes/CUES/Assessment HEP     x     x               x          x     x         HS cramp x    x   Bike full rev for ROM 8'             VC    VC                                            Incline G stretch BLEs     Reformer  BLE squats no heel raise  BHR w QS/GS VC/MC    NMR  SLR  SAQ 10/10  2.0  biphasic          SB flexion with strap after manuals :10 20                Pt education x5' HEP review          Manual Intervention ([id number])                STM VL, ITB, lat HS  Manual patellar mobs all dir  Manual HS and ITB stretch  PROM KF supine and seated w HS deactivation, modified alysha stretch                 IASTM L  VL,  PES area, lat pat, patellar mobs medial and superior, scar mobs, STW for edema pes area, mobs for KF, prone  quad stretch, fibular glides, PROM knee Flexion with sup patellar glide, tibial rotation 30'                       Therapeutic Activity (84915)                                                Therapeutic Exercise and NMR EXR  [x] (69743) Provided verbal/tactile cueing for activities related to strengthening, flexibility, endurance, ROM for improvements in LE, proximal hip, and core control with self care, mobility, lifting, ambulation. [x] (04045) Provided verbal/tactile cueing for activities related to improving balance, coordination, kinesthetic sense, posture, motor skill, proprioception  to assist with LE, proximal hip, and core control in self care, mobility, lifting, ambulation and eccentric single leg control.      NMR and Therapeutic Activities:    [x] (84402 or 81625) Provided verbal/tactile cueing for activities related to improving balance, coordination, kinesthetic sense, posture, motor skill, proprioception and motor activation to allow for proper function of core, proximal hip and LE with self care and ADLs  [x] (95939) Gait Re-education- Provided training and instruction to the patient for proper LE, core and proximal hip recruitment and positioning and eccentric body weight control with ambulation re-education including up and down stairs     Home Exercise Program:    [x] (05140) Reviewed/Progressed HEP activities related to strengthening, flexibility, endurance, ROM of core, proximal hip and LE for functional self-care, mobility, lifting and ambulation/stair navigation   [] (42244)Reviewed/Progressed HEP activities related to improving balance, coordination, kinesthetic sense, posture, motor skill, proprioception of core, proximal hip and LE for self care, mobility, lifting, and ambulation/stair navigation      Manual Treatments:  PROM / STM / Oscillations-Mobs:  G-I, II, III, IV (PA's, Inf., Post.)  [x] (51946) Provided manual therapy to mobilize LE, proximal hip and/or LS spine soft tissue/joints for the purpose of modulating pain, promoting relaxation,  increasing ROM, reducing/eliminating soft tissue swelling/inflammation/restriction, improving soft tissue extensibility and allowing for proper ROM for normal function with self care, mobility, lifting and ambulation. Trigger point Dry Needling:  fine needle insertion into myofascial trigger points to stimulate a healing response  [] (03059) Needle insertion without injection: 1 or 2 muscles  [] (90955) Needle insertion without injection: 3 or more muscles    Modalities:  Pt demonstrates edema and swelling that does not respond to regular conventional RICE and requires use of a compression/vasopneumatic device to reduce. [] GAME READY (VASO)- for significant edema, swelling, pain control. 10'- no time due to MD appt.    [] ESU (HV/PM) for edema and pain control      Charges:  Timed Code Treatment Minutes: 45   Total Treatment Minutes: 45     Medicare total (add KX at $2000)  1100        [] EVAL (LOW) 59173 (typically 20 minutes face-to-face)  [] EVAL (MOD) 39940 (typically 30 minutes face-to-face)  [] EVAL (HIGH) 30981 (typically 45 minutes face-to-face)  [] RE-EVAL     [] GL(59359) x  1  [] IONTO  [x] NMR (92524) x  1   [] VASO  [x] Manual (67189) x  2 [] Other:  [] TA x      [] Mech Traction (30893)  [] ES(attended) (32693)     [] ES (un) (44434):             GOALS:      Patient stated goal: to be able to walk   [x] Progressing: [] Met: [] Not Met: [] Adjusted     Therapist goals for Patient:     Short Term Goals: To be achieved in: 2 weeks 2/2 MET    1. Independent in HEP and progression per patient tolerance, in order to prevent re-injury. [] Progressing: [x] Met: [] Not Met: [] Adjusted  2. Patient will have a decrease in pain to facilitate improvement in movement, function, and ADLs as indicated by Functional Deficits. [] Progressing: [x] Met: [] Not Met: [] Adjusted     Long Term Goals: To be achieved in: 12 weeks        1. FOTO score to be equal or greater to the predicted score (57/100)  to assist with reaching prior level of function. [x] Progressing: [] Met: [] Not Met: [] Adjusted      2. Patient will demonstrate increased AROM to 0-120 to allow for getting in and out of car as indicated by patients Functional Deficits. [] Progressing: [] Met: [x] Not Met: [] Adjusted       3. Patient will demonstrate an increase in strength to 4+/5 to allow for sit to stand as indicated by patients Functional Deficits. [x] Progressing: [] Met: [] Not Met: [] Adjusted       4. Patient will return to all transfers, work activities, and functional activities without increased symptoms or restriction, specifically deep water aerobics. [x] Progressing: [] Met: [] Not Met: [] Adjusted       5.  (Pt specific functional or activity goal)Pt will be able to walk for 20 minutes w/o walker and w/o increased s/s for grocery shopping. [x] Progressing: [] Met: [] Not Met: [] Adjusted  6. Pt will demonstrate pain decreased by 50%  (0-4/10) with all ADLS. [x] Progressing: [] Met: [] Not Met: [] Adjusted        ASSESSMENT: pt. With significant ST restriction L VL, infrapatellar, distal scar and fibular glide, also limited with post tibial glide and rotation medially. Focused on manuals to address restrictions as above and able to achieve 110 with knee flexion and full revolution on bike today. Educated pt. On the need to cont. To get to the end range of motion to achieve stretch and to continue to manage swelling to assist with tolerating end range stretch. Patient will benefit from continued skilled intervention to increase ROM, flexibility, strength and function. Overall Progression Towards Functional goals/ Treatment Progress Update:  [] Patient is progressing as expected towards functional goals listed. [x] Progression is slowed due to complexities/Impairments listed. [] Progression has been slowed due to co-morbidities. [] Plan just implemented, too soon to assess goals progression <30days   [] Goals require adjustment due to lack of progress  [] Patient is not progressing as expected and requires additional follow up with physician  [] Other    Prognosis for POC: [x] Good [] Fair  [] Poor      Patient requires continued skilled intervention: [x] Yes  [] No    Treatment/Activity Tolerance:  [x] Patient able to complete treatment  [] Patient limited by fatigue  [x] Patient limited by pain    [] Patient limited by other medical complications  [] Other:             PLAN: Cont. PT 2x/week. Focus on ROM and strength, balance as well as gait.   [x] Continue per plan of care [] Alter current plan (see comments above)  [] Plan of care initiated [] Hold pending MD visit [] Discharge      Electronically signed by:  Thaddeus Arndt, PT, MSPT, OMT-C [id number]          Note: If patient does not return for scheduled/ recommended follow up visits, this note will serve as a discharge from care along with most recent update on progress.

## 2022-10-11 ENCOUNTER — CARE COORDINATION (OUTPATIENT)
Dept: CASE MANAGEMENT | Age: 67
End: 2022-10-11

## 2022-10-11 ENCOUNTER — HOSPITAL ENCOUNTER (OUTPATIENT)
Dept: PHYSICAL THERAPY | Age: 67
Setting detail: THERAPIES SERIES
Discharge: HOME | End: 2022-10-11
Payer: MEDICARE

## 2022-10-11 PROCEDURE — 97140 MANUAL THERAPY 1/> REGIONS: CPT | Performed by: PHYSICAL THERAPIST

## 2022-10-11 PROCEDURE — 97110 THERAPEUTIC EXERCISES: CPT | Performed by: PHYSICAL THERAPIST

## 2022-10-11 NOTE — FLOWSHEET NOTE
[facility],  [address]  Phone: [phone]  Fax [phone]  :  Physical Therapy Treatment Note/ Progress Report:           Date:  10/11/2022    Patient Name:  Lali Bland    :  1955  MRN: [id number]      Referring Physician: Kallie Armstrong MD                                                     Evaluation Date: 2022                                           Date of Referral:22     Insurance: Medicare     Next MD appt: scheduled:9/15/22     Medical Diagnosis:  R42.252 (ICD-10-CM) - S/P total knee arthroplasty, left        Treatment Diagnosis:  M25.662 L knee stiffness; M25.462 L knee effusion;  M25.562 L knee pain; difficulty walking R26.2  S/P L knee TKA 22           Precautions/ Contra-indications:         Has the plan of care been signed (Y/N):        [x]  Yes  []  No    Progress report will be due (10 Rx or 30 days ):   Visit 10 or 22 DONE visit 5 22  DUE visit 15 3945/17      Recertification will be due (POC Duration  / 90 days ): 22     Is this a Progress Report:     []  Yes  [x]  No      If Yes:  Date Range for reporting period:  Beginnin22  Ending:              Visit # Date Range Insurance Allowable Requires auth   IN PERSON 12 22- BMN    [x]no        []yes:   TELEHEALTH       TOTAL              CX/NS       Next PT appt: 10/13/22                PT Practice Pattern:H  Co morbidities:1-2  Surgical:L TKA  DOS 22  Nonsurgical  INITIAL VISIT 22 CURRENT VISIT 10/4/22   PAIN 0-10/10 4-8/10 4-5/10   FUNCTIONAL SCALE FOTO 18/100 44/100   ROM KE -5 0    KF 45 110 after manuals                           STRENGHT (MMT) Quad tone trace Fair    KE  4+/5    KF  4+/5    HF  4+/5                         Latex Allergy/Pacemaker/Adhesive allergy:  [x]NO      []YES     RESTRICTIONS/PRECAUTIONS: protocol         SUBJECTIVE:  Pt reports that her knee is sore and she had some difficulty sleeping last night. Overall achy. Pain level:  3/10    OBJECTIVE:   Observation: mod TTP pes bursa (continued swelling); max TTP medial HS and ADD;  hypomobile superior patellar mobs w flexion. Exercises/Interventions:     HEP:  [employer]  Access Code: [email or web address]  URL: [email or web address]  Date: 08/31/2022  Prepared by: Basilio Cox      Therapeutic Ex (48948)  NMR re-education (71558) Reps/Sets/time Notes/CUES/Assessment HEP   See previous flowsheets for HEP                                                      HS cramp x    x                 VC    VC                                      Prone HSC 2x10x3\" 1#                      Recumbent full rev for ROM 8'     Incline G stretch BLEs 4x20\"     VC/MC    10/10  2.0  biphasic                         Pt education x5' HEP review          Manual Intervention ([id number])                           Manual EXT mobs and HS/ITB stretch 5'     STM and TPR L  VL,  PES area, lat pat, patellar mobs medial and superior, scar mobs, STW for edema pes area, mobs for KF, prone  quad stretch, fibular glides, PROM knee Flexion with sup patellar glide, tibial rotation 18'                       Therapeutic Activity (79123)                                                Therapeutic Exercise and NMR EXR  [x] (53060) Provided verbal/tactile cueing for activities related to strengthening, flexibility, endurance, ROM for improvements in LE, proximal hip, and core control with self care, mobility, lifting, ambulation.  [] (11392) Provided verbal/tactile cueing for activities related to improving balance, coordination, kinesthetic sense, posture, motor skill, proprioception  to assist with LE, proximal hip, and core control in self care, mobility, lifting, ambulation and eccentric single leg control.      NMR and Therapeutic Activities:    [] (77102 or 62746) Provided verbal/tactile cueing for activities related to improving balance, coordination, kinesthetic Total Treatment Minutes: 43     Medicare total (add KX at $2000)  1200        [] EVAL (LOW) 80649 (typically 20 minutes face-to-face)  [] EVAL (MOD) 34884 (typically 30 minutes face-to-face)  [] EVAL (HIGH) 72499 (typically 45 minutes face-to-face)  [] RE-EVAL     [x] WD(92114) x  1  [] IONTO  [] NMR (15066) x     [] VASO  [x] Manual (89413) x  2 [] Other:  [] TA x      [] Mech Traction (33213)  [] ES(attended) (91918)     [] ES (un) (07666):             GOALS:      Patient stated goal: to be able to walk   [x] Progressing: [] Met: [] Not Met: [] Adjusted     Therapist goals for Patient:     Short Term Goals: To be achieved in: 2 weeks 2/2 MET    1. Independent in HEP and progression per patient tolerance, in order to prevent re-injury. [] Progressing: [x] Met: [] Not Met: [] Adjusted  2. Patient will have a decrease in pain to facilitate improvement in movement, function, and ADLs as indicated by Functional Deficits. [] Progressing: [x] Met: [] Not Met: [] Adjusted     Long Term Goals: To be achieved in: 12 weeks        1. FOTO score to be equal or greater to the predicted score (57/100)  to assist with reaching prior level of function. [x] Progressing: [] Met: [] Not Met: [] Adjusted      2. Patient will demonstrate increased AROM to 0-120 to allow for getting in and out of car as indicated by patients Functional Deficits. [] Progressing: [] Met: [x] Not Met: [] Adjusted       3. Patient will demonstrate an increase in strength to 4+/5 to allow for sit to stand as indicated by patients Functional Deficits. [x] Progressing: [] Met: [] Not Met: [] Adjusted       4. Patient will return to all transfers, work activities, and functional activities without increased symptoms or restriction, specifically deep water aerobics. [x] Progressing: [] Met: [] Not Met: [] Adjusted       5.  (Pt specific functional or activity goal)Pt will be able to walk for 20 minutes w/o walker and w/o increased s/s for grocery shopping. [x] Progressing: [] Met: [] Not Met: [] Adjusted  6. Pt will demonstrate pain decreased by 50%  (0-4/10) with all ADLS. [x] Progressing: [] Met: [] Not Met: [] Adjusted        ASSESSMENT:   Pt was TTP and demonstrated swelling this visit consistent with pushing her ROM and WNL. Treatment was more gentle today and will resume more aggressive ROM and in clinic strength NV as well as functional tasks. Patient will benefit from continued skilled intervention to increase ROM, flexibility, strength and function. Overall Progression Towards Functional goals/ Treatment Progress Update:  [] Patient is progressing as expected towards functional goals listed. [x] Progression is slowed due to complexities/Impairments listed. [] Progression has been slowed due to co-morbidities. [] Plan just implemented, too soon to assess goals progression <30days   [] Goals require adjustment due to lack of progress  [] Patient is not progressing as expected and requires additional follow up with physician  [] Other    Prognosis for POC: [x] Good [] Fair  [] Poor      Patient requires continued skilled intervention: [x] Yes  [] No    Treatment/Activity Tolerance:  [x] Patient able to complete treatment  [] Patient limited by fatigue  [x] Patient limited by pain    [] Patient limited by other medical complications  [] Other:             PLAN: Cont. PT 2x/week. Focus on ROM and strength, balance as well as gait and stairs. [x] Continue per plan of care [] Alter current plan (see comments above)  [] Plan of care initiated [] Hold pending MD visit [] Discharge      Electronically signed by:  Mono Faustin, PT, [id number]          Note: If patient does not return for scheduled/ recommended follow up visits, this note will serve as a discharge from care along with most recent update on progress.

## 2022-10-11 NOTE — CARE COORDINATION
Called patient for updates. Left message for return call.   Rosie Chapman BSN  Care Transition Nurse for [facility]  [phone]

## 2022-10-13 ENCOUNTER — HOSPITAL ENCOUNTER (OUTPATIENT)
Dept: PHYSICAL THERAPY | Age: 67
Setting detail: THERAPIES SERIES
Discharge: HOME | End: 2022-10-13
Payer: MEDICARE

## 2022-10-13 PROCEDURE — 97140 MANUAL THERAPY 1/> REGIONS: CPT | Performed by: PHYSICAL THERAPIST

## 2022-10-13 PROCEDURE — 97112 NEUROMUSCULAR REEDUCATION: CPT | Performed by: PHYSICAL THERAPIST

## 2022-10-13 PROCEDURE — 97110 THERAPEUTIC EXERCISES: CPT | Performed by: PHYSICAL THERAPIST

## 2022-10-13 NOTE — FLOWSHEET NOTE
[facility], [address] [place]  Phone: [phone]  Fax [phone]  :  Physical Therapy Treatment Note/ Progress Report:           Date:  10/13/2022    Patient Name:  Trace Saenz    :  1955  MRN: [id number]      Referring Physician: Richard Goodman MD                                                     Evaluation Date: 2022                                           Date of Referral:22     Insurance: Medicare     Next MD appt: scheduled:9/15/22     Medical Diagnosis:  E61.887 (ICD-10-CM) - S/P total knee arthroplasty, left        Treatment Diagnosis:  M25.662 L knee stiffness; M25.462 L knee effusion;  M25.562 L knee pain; difficulty walking R26.2  S/P L knee TKA 22           Precautions/ Contra-indications:         Has the plan of care been signed (Y/N):        [x]  Yes  []  No    Progress report will be due (10 Rx or 30 days ):   Visit 10 or 22 DONE visit 5 22  DUE visit 15       Recertification will be due (POC Duration  / 90 days ): 22     Is this a Progress Report:     []  Yes  [x]  No      If Yes:  Date Range for reporting period:  Beginnin22  Ending:              Visit # Date Range Insurance Allowable Requires auth   IN PERSON 13 22- BMN    [x]no        []yes:   TELEHEALTH       TOTAL              CX/NS       Next PT appt: 10/19/22                PT Practice Pattern:H  Co morbidities:1-2  Surgical:L TKA  DOS 22  Nonsurgical  INITIAL VISIT 22 CURRENT VISIT 10/4/22   PAIN 0-10/10 4-8/10 4-5/10   FUNCTIONAL SCALE FOTO 18/100 44/100   ROM KE -5 0    KF 45 110 after manuals                           STRENGHT (MMT) Quad tone trace Fair    KE  4+/5    KF  4+/5    HF  4+/5                         Latex Allergy/Pacemaker/Adhesive allergy:  [x]NO      []YES     RESTRICTIONS/PRECAUTIONS: protocol         SUBJECTIVE:  Pt reports she drove to PT today and LE with self care and ADLs  [] (80316) Gait Re-education- Provided training and instruction to the patient for proper LE, core and proximal hip recruitment and positioning and eccentric body weight control with ambulation re-education including up and down stairs     Home Exercise Program:    [x] (16220) Reviewed/Progressed HEP activities related to strengthening, flexibility, endurance, ROM of core, proximal hip and LE for functional self-care, mobility, lifting and ambulation/stair navigation   [] (05496)Reviewed/Progressed HEP activities related to improving balance, coordination, kinesthetic sense, posture, motor skill, proprioception of core, proximal hip and LE for self care, mobility, lifting, and ambulation/stair navigation      Manual Treatments:  PROM / STM / Oscillations-Mobs:  G-I, II, III, IV (PA's, Inf., Post.)  [x] (85691) Provided manual therapy to mobilize LE, proximal hip and/or LS spine soft tissue/joints for the purpose of modulating pain, promoting relaxation,  increasing ROM, reducing/eliminating soft tissue swelling/inflammation/restriction, improving soft tissue extensibility and allowing for proper ROM for normal function with self care, mobility, lifting and ambulation. Trigger point Dry Needling:  fine needle insertion into myofascial trigger points to stimulate a healing response  [] (45910) Needle insertion without injection: 1 or 2 muscles  [] (84567) Needle insertion without injection: 3 or more muscles    Modalities:  Pt demonstrates edema and swelling that does not respond to regular conventional RICE and requires use of a compression/vasopneumatic device to reduce. However she declined this visit. [] GAME READY (VASO)- for significant edema, swelling, pain control.     [] ESU (HV/PM) for edema and pain control      Charges:  Timed Code Treatment Minutes: 47   Total Treatment Minutes: 47     Medicare total (add KX at $2000)  1300        [] EVAL (LOW) 455 1011 (typically 20 minutes face-to-face)  [] EVAL (MOD) 72830 (typically 30 minutes face-to-face)  [] EVAL (HIGH) 21384 (typically 45 minutes face-to-face)  [] RE-EVAL     [x] PH(94399) x  1  [] IONTO  [x] NMR (88567) x   1  [] VASO  [x] Manual (94905) x  1 [] Other:  [] TA x      [] Mech Traction (83135)  [] ES(attended) (46489)     [] ES (un) (45435):             GOALS:      Patient stated goal: to be able to walk   [x] Progressing: [] Met: [] Not Met: [] Adjusted     Therapist goals for Patient:     Short Term Goals: To be achieved in: 2 weeks 2/2 MET    1. Independent in HEP and progression per patient tolerance, in order to prevent re-injury. [] Progressing: [x] Met: [] Not Met: [] Adjusted  2. Patient will have a decrease in pain to facilitate improvement in movement, function, and ADLs as indicated by Functional Deficits. [] Progressing: [x] Met: [] Not Met: [] Adjusted     Long Term Goals: To be achieved in: 12 weeks        1. FOTO score to be equal or greater to the predicted score (57/100)  to assist with reaching prior level of function. [x] Progressing: [] Met: [] Not Met: [] Adjusted      2. Patient will demonstrate increased AROM to 0-120 to allow for getting in and out of car as indicated by patients Functional Deficits. [] Progressing: [] Met: [x] Not Met: [] Adjusted       3. Patient will demonstrate an increase in strength to 4+/5 to allow for sit to stand as indicated by patients Functional Deficits. [x] Progressing: [] Met: [] Not Met: [] Adjusted       4. Patient will return to all transfers, work activities, and functional activities without increased symptoms or restriction, specifically deep water aerobics. [x] Progressing: [] Met: [] Not Met: [] Adjusted       5. (Pt specific functional or activity goal)Pt will be able to walk for 20 minutes w/o walker and w/o increased s/s for grocery shopping. [x] Progressing: [] Met: [] Not Met: [] Adjusted  6.  Pt will demonstrate pain decreased by 50% (0-4/10) with all ADLS. [x] Progressing: [] Met: [] Not Met: [] Adjusted        ASSESSMENT:   Pt was TTP and demonstrated swelling this visit consistent with pushing her ROM and WNL. Treatment was more gentle today and will resume more aggressive ROM and in clinic strength NV as well as functional tasks. Patient will benefit from continued skilled intervention to increase ROM, flexibility, strength and function. Overall Progression Towards Functional goals/ Treatment Progress Update:  [] Patient is progressing as expected towards functional goals listed. [x] Progression is slowed due to complexities/Impairments listed. [] Progression has been slowed due to co-morbidities. [] Plan just implemented, too soon to assess goals progression <30days   [] Goals require adjustment due to lack of progress  [] Patient is not progressing as expected and requires additional follow up with physician  [] Other    Prognosis for POC: [x] Good [] Fair  [] Poor      Patient requires continued skilled intervention: [x] Yes  [] No    Treatment/Activity Tolerance:  [x] Patient able to complete treatment  [] Patient limited by fatigue  [x] Patient limited by pain    [] Patient limited by other medical complications  [] Other:             PLAN: Cont. PT 2x/week. Focus on ROM and strength, balance as well as gait and stairs. [x] Continue per plan of care [] Alter current plan (see comments above)  [] Plan of care initiated [] Hold pending MD visit [] Discharge      Electronically signed by:  Aubrey Padgett PT, [id number]          Note: If patient does not return for scheduled/ recommended follow up visits, this note will serve as a discharge from care along with most recent update on progress.

## 2022-10-17 ENCOUNTER — CARE COORDINATION (OUTPATIENT)
Dept: CASE MANAGEMENT | Age: 67
End: 2022-10-17

## 2022-10-17 NOTE — CARE COORDINATION
Spoke with patient. Incision status: States free from redness or drainage. Edema/Swelling: States swelling improving. Pain level and status: States pain is tolerable, more soreness with bending and pain. Use of pain medications: States taking tylenol for pain relief. Bowels: No complaints. Outpatient therapy: Continues.     Do you have all of your medications: Yes    Changes in medications: No    Follow up appointments:    Future Appointments   Date Time Provider Cris Oates   10/19/2022 11:45 AM Zulema Zhang,  [address] PT [facility]   10/20/2022 10:15 [place] [id number] [facility] AND PT [facility]   12/8/2022  9:15 AM Jose De Jesus Fortune MD AND SCOOBY Tate BSN  Care Transition Nurse for [facility]  [phone]

## 2022-10-18 ENCOUNTER — APPOINTMENT (OUTPATIENT)
Dept: PHYSICAL THERAPY | Age: 67
End: 2022-10-18
Payer: MEDICARE

## 2022-10-19 ENCOUNTER — HOSPITAL ENCOUNTER (OUTPATIENT)
Dept: PHYSICAL THERAPY | Age: 67
Setting detail: THERAPIES SERIES
Discharge: HOME | End: 2022-10-19
Payer: MEDICARE

## 2022-10-19 PROCEDURE — 97110 THERAPEUTIC EXERCISES: CPT | Performed by: PHYSICAL THERAPIST

## 2022-10-19 PROCEDURE — 97140 MANUAL THERAPY 1/> REGIONS: CPT | Performed by: PHYSICAL THERAPIST

## 2022-10-19 PROCEDURE — 97112 NEUROMUSCULAR REEDUCATION: CPT | Performed by: PHYSICAL THERAPIST

## 2022-10-19 NOTE — FLOWSHEET NOTE
[facility], [address] [place]  Phone: [phone]  Fax [phone]  :  Physical Therapy Treatment Note/ Progress Report:           Date:  10/19/2022    Patient Name:  Chandni Coyle    :  1955  MRN: [id number]      Referring Physician: Abilio Smith MD                                                     Evaluation Date: 2022                                           Date of Referral:22     Insurance: Medicare     Next MD appt: scheduled:9/15/22     Medical Diagnosis:  B00.804 (ICD-10-CM) - S/P total knee arthroplasty, left        Treatment Diagnosis:  M25.662 L knee stiffness; M25.462 L knee effusion;  M25.562 L knee pain; difficulty walking R26.2  S/P L knee TKA 22           Precautions/ Contra-indications:         Has the plan of care been signed (Y/N):        [x]  Yes  []  No    Progress report will be due (10 Rx or 30 days ):   Visit 10 or 22 DONE visit 5 22  DUE visit 15 40/34/58      Recertification will be due (POC Duration  / 90 days ): 22     Is this a Progress Report:     []  Yes  [x]  No      If Yes:  Date Range for reporting period:  Beginnin22  Ending:              Visit # Date Range Insurance Allowable Requires auth   IN PERSON 13 22- BMN    [x]no        []yes:   TELEHEALTH       TOTAL              CX/NS       Next PT appt: 10/19/22                PT Practice Pattern:H  Co morbidities:1-2  Surgical:L TKA  DOS 22  Nonsurgical  INITIAL VISIT 22 CURRENT VISIT 10/4/22   PAIN 0-10/10 4-8/10 4-5/10   FUNCTIONAL SCALE FOTO 18/100 44/100   ROM KE -5 0    KF 45 110 after manuals                           STRENGHT (MMT) Quad tone trace Fair    KE  4+/5    KF  4+/5    HF  4+/5                         Latex Allergy/Pacemaker/Adhesive allergy:  [x]NO      []YES     RESTRICTIONS/PRECAUTIONS: protocol         SUBJECTIVE:  Pt reports she drove to PT today and felt good. Pain level:  3/10    OBJECTIVE:   Observation: pt arrived late, resume manuals NV  PROM KF:         Exercises/Interventions:     HEP:  [employer]  Access Code: [email or web address]  URL: [email or web address]  Date: 08/31/2022  Prepared by: Vladislav Murdock      Therapeutic Ex (72493)  NMR re-education (38264) Reps/Sets/time Notes/CUES/Assessment HEP   See previous flowsheets for HEP                                                      HS cramp x    x                 VC    VC                                      1#    Tang stretch KF stretch 3x20\"     Tang stretch HS stretch 3x20\"           Recumbent full rev for ROM 5'     Incline G stretch BLEs 4x20\"     VC/MC    10/10  2.0  biphasic             LP BLE 60#  15x     KF BLE 10#  15x     LAQ EOB LLE 2x10  2#       Lateral walk 25ft x2     Forward high knee march 25ft x2     Backward walk 25ft x2           Pt education x5' Stair training          Manual Intervention (27150)                                                      Therapeutic Activity (42708)                                                Therapeutic Exercise and NMR EXR  [x] (45620) Provided verbal/tactile cueing for activities related to strengthening, flexibility, endurance, ROM for improvements in LE, proximal hip, and core control with self care, mobility, lifting, ambulation.  [] (45125) Provided verbal/tactile cueing for activities related to improving balance, coordination, kinesthetic sense, posture, motor skill, proprioception  to assist with LE, proximal hip, and core control in self care, mobility, lifting, ambulation and eccentric single leg control.      NMR and Therapeutic Activities:    [] (36326 or 24765) Provided verbal/tactile cueing for activities related to improving balance, coordination, kinesthetic sense, posture, motor skill, proprioception and motor activation to allow for proper function of core, proximal hip and LE with self care and ADLs  [] (53759) Gait Re-education- Provided training and instruction to the patient for proper LE, core and proximal hip recruitment and positioning and eccentric body weight control with ambulation re-education including up and down stairs     Home Exercise Program:    [x] (52077) Reviewed/Progressed HEP activities related to strengthening, flexibility, endurance, ROM of core, proximal hip and LE for functional self-care, mobility, lifting and ambulation/stair navigation   [] (43741)Reviewed/Progressed HEP activities related to improving balance, coordination, kinesthetic sense, posture, motor skill, proprioception of core, proximal hip and LE for self care, mobility, lifting, and ambulation/stair navigation      Manual Treatments:  PROM / STM / Oscillations-Mobs:  G-I, II, III, IV (PA's, Inf., Post.)  [] (92534) Provided manual therapy to mobilize LE, proximal hip and/or LS spine soft tissue/joints for the purpose of modulating pain, promoting relaxation,  increasing ROM, reducing/eliminating soft tissue swelling/inflammation/restriction, improving soft tissue extensibility and allowing for proper ROM for normal function with self care, mobility, lifting and ambulation. Trigger point Dry Needling:  fine needle insertion into myofascial trigger points to stimulate a healing response  [] (26164) Needle insertion without injection: 1 or 2 muscles  [] (25231) Needle insertion without injection: 3 or more muscles    Modalities:  Pt demonstrates edema and swelling that does not respond to regular conventional RICE and requires use of a compression/vasopneumatic device to reduce. However she declined this visit. [] GAME READY (VASO)- for significant edema, swelling, pain control.     [] ESU (HV/PM) for edema and pain control      Charges:  Timed Code Treatment Minutes: 38   Total Treatment Minutes: 38     Medicare total (add KX at $2000)  1200        [] EVAL (LOW) 43994 (typically 20 minutes face-to-face)  [] EVAL (MOD) 35305 (typically 30 minutes face-to-face)  [] EVAL (HIGH) 23439 (typically 45 minutes face-to-face)  [] RE-EVAL     [x] DI(37500) x  1  [] IONTO  [x] NMR (99458) x   1  [] VASO  [x] Manual (42224) x  1 [] Other:  [] TA x      [] Mech Traction (08225)  [] ES(attended) (56139)     [] ES (un) (63471):             GOALS:      Patient stated goal: to be able to walk   [x] Progressing: [] Met: [] Not Met: [] Adjusted     Therapist goals for Patient:     Short Term Goals: To be achieved in: 2 weeks 2/2 MET    1. Independent in HEP and progression per patient tolerance, in order to prevent re-injury. [] Progressing: [x] Met: [] Not Met: [] Adjusted  2. Patient will have a decrease in pain to facilitate improvement in movement, function, and ADLs as indicated by Functional Deficits. [] Progressing: [x] Met: [] Not Met: [] Adjusted     Long Term Goals: To be achieved in: 12 weeks        1. FOTO score to be equal or greater to the predicted score (57/100)  to assist with reaching prior level of function. [x] Progressing: [] Met: [] Not Met: [] Adjusted      2. Patient will demonstrate increased AROM to 0-120 to allow for getting in and out of car as indicated by patients Functional Deficits. [] Progressing: [] Met: [x] Not Met: [] Adjusted       3. Patient will demonstrate an increase in strength to 4+/5 to allow for sit to stand as indicated by patients Functional Deficits. [x] Progressing: [] Met: [] Not Met: [] Adjusted       4. Patient will return to all transfers, work activities, and functional activities without increased symptoms or restriction, specifically deep water aerobics. [x] Progressing: [] Met: [] Not Met: [] Adjusted       5. (Pt specific functional or activity goal)Pt will be able to walk for 20 minutes w/o walker and w/o increased s/s for grocery shopping. [x] Progressing: [] Met: [] Not Met: [] Adjusted  6. Pt will demonstrate pain decreased by 50%  (0-4/10) with all ADLS.   [x] Progressing: [] Met: [] Not Met: [] Adjusted        ASSESSMENT:   Pt tolerated session well. Continued tightness in flexion ROM. Patient will benefit from continued skilled intervention to increase ROM, flexibility, strength and function. Overall Progression Towards Functional goals/ Treatment Progress Update:  [] Patient is progressing as expected towards functional goals listed. [x] Progression is slowed due to complexities/Impairments listed. [] Progression has been slowed due to co-morbidities. [] Plan just implemented, too soon to assess goals progression <30days   [] Goals require adjustment due to lack of progress  [] Patient is not progressing as expected and requires additional follow up with physician  [] Other    Prognosis for POC: [x] Good [] Fair  [] Poor      Patient requires continued skilled intervention: [x] Yes  [] No    Treatment/Activity Tolerance:  [x] Patient able to complete treatment  [] Patient limited by fatigue  [x] Patient limited by pain    [] Patient limited by other medical complications  [] Other:             PLAN: Cont. PT 2x/week. Focus on ROM and strength, balance as well as gait and stairs. [x] Continue per plan of care [] Alter current plan (see comments above)  [] Plan of care initiated [] Hold pending MD visit [] Discharge      Electronically signed by:  José Miguel Chow, PT, [id number]          Note: If patient does not return for scheduled/ recommended follow up visits, this note will serve as a discharge from care along with most recent update on progress.

## 2022-10-20 ENCOUNTER — HOSPITAL ENCOUNTER (OUTPATIENT)
Dept: PHYSICAL THERAPY | Age: 67
Setting detail: THERAPIES SERIES
Discharge: HOME | End: 2022-10-20
Payer: MEDICARE

## 2022-10-20 PROCEDURE — 97112 NEUROMUSCULAR REEDUCATION: CPT | Performed by: PHYSICAL THERAPIST

## 2022-10-20 PROCEDURE — 97110 THERAPEUTIC EXERCISES: CPT | Performed by: PHYSICAL THERAPIST

## 2022-10-20 NOTE — PROGRESS NOTES
[facility], [address] [place]  Phone: [phone]  Fax [phone]  :  Physical Therapy Treatment Note/ Progress Report:           Date:  10/20/2022    Patient Name:  Johnny Negron    :  1955  MRN: [id number]      Referring Physician: Nii Catalan MD                                                     Evaluation Date: 2022                                           Date of Referral:22     Insurance: Medicare     Next MD appt: scheduled:9/15/22     Medical Diagnosis:  J58.492 (ICD-10-CM) - S/P total knee arthroplasty, left        Treatment Diagnosis:  M25.662 L knee stiffness; M25.462 L knee effusion;  M25.562 L knee pain; difficulty walking R26.2  S/P L knee TKA 22           Precautions/ Contra-indications:         Has the plan of care been signed (Y/N):        [x]  Yes  []  No    Progress report will be due (10 Rx or 30 days ):   Visit 10 or 22 DONE visit 5 22  DUE visit 15 10/13/22 DONE visit 13       Recertification will be due (POC Duration  / 90 days ): 22     Is this a Progress Report:     [x]  Yes  []  No      If Yes:  Date Range for reporting period:  Beginnin22  Ending: 10/20/22              Visit # Date Range Insurance Allowable Requires auth   IN PERSON 13 22-10/20/22 BMN    [x]no        []yes:   TELEHEALTH       TOTAL              CX/NS       Next PT appt: 10/19/22                PT Practice Pattern:H  Co morbidities:1-2  Surgical:L TKA  DOS 22  Nonsurgical  INITIAL VISIT 22 CURRENT VISIT 10/4/22   PAIN 0-10/10 4-8/10 4-5/10   FUNCTIONAL SCALE FOTO 18/100 44/100   ROM KE -5 0    KF 45 110 after manuals                           STRENGHT (MMT) Quad tone trace Fair    KE  4+/5    KF  4+/5    HF  4+/5                         Latex Allergy/Pacemaker/Adhesive allergy:  [x]NO      []YES     RESTRICTIONS/PRECAUTIONS: protocol         SUBJECTIVE: Pt reports she tried to go up and down the stairs. Pain level:  3/10    OBJECTIVE:   Observation: stiff gait this session. Exercises/Interventions:     HEP:  [employer]  Access Code: [email or web address]  URL: [email or web address]  Date: 08/31/2022  Prepared by: Negro Mathis          GOALS:      Patient stated goal: to be able to walk   [x] Progressing: [] Met: [] Not Met: [] Adjusted     Therapist goals for Patient:     Short Term Goals: To be achieved in: 2 weeks 2/2 MET    1. Independent in HEP and progression per patient tolerance, in order to prevent re-injury. [] Progressing: [x] Met: [] Not Met: [] Adjusted  2. Patient will have a decrease in pain to facilitate improvement in movement, function, and ADLs as indicated by Functional Deficits. [] Progressing: [x] Met: [] Not Met: [] Adjusted     Long Term Goals: To be achieved in: 12 weeks        1. FOTO score to be equal or greater to the predicted score (57/100)  to assist with reaching prior level of function. [x] Progressing: [] Met: [] Not Met: [] Adjusted      2. Patient will demonstrate increased AROM to 0-120 to allow for getting in and out of car as indicated by patients Functional Deficits. [x] Progressing: [] Met: [] Not Met: [] Adjusted       3. Patient will demonstrate an increase in strength to 4+/5 to allow for sit to stand as indicated by patients Functional Deficits. [x] Progressing: [] Met: [] Not Met: [] Adjusted       4. Patient will return to all transfers, work activities, and functional activities without increased symptoms or restriction, specifically deep water aerobics. [x] Progressing: [] Met: [] Not Met: [] Adjusted       5. (Pt specific functional or activity goal)Pt will be able to walk for 20 minutes w/o walker and w/o increased s/s for grocery shopping. [x] Progressing: [] Met: [] Not Met: [] Adjusted  6. Pt will demonstrate pain decreased by 50%  (0-4/10) with all ADLS.   [x] Progressing: [] Met: [] Not Met: [] Adjusted        ASSESSMENT:   Pt tolerated session well. Continued tightness in flexion ROM. Patient will benefit from continued skilled intervention to increase ROM, flexibility, strength and function. Overall Progression Towards Functional goals/ Treatment Progress Update:  [x] Patient is progressing as expected towards functional goals listed. [] Progression is slowed due to complexities/Impairments listed. [] Progression has been slowed due to co-morbidities. [] Plan just implemented, too soon to assess goals progression <30days   [] Goals require adjustment due to lack of progress  [] Patient is not progressing as expected and requires additional follow up with physician  [] Other    Prognosis for POC: [x] Good [] Fair  [] Poor      Patient requires continued skilled intervention: [x] Yes  [] No    Treatment/Activity Tolerance:  [x] Patient able to complete treatment  [] Patient limited by fatigue  [x] Patient limited by pain    [] Patient limited by other medical complications  [] Other:             PLAN: Cont. PT 2x/week. Focus on ROM and strength, balance as well as gait and stairs. [x] Continue per plan of care [] Alter current plan (see comments above)  [] Plan of care initiated [] Hold pending MD visit [] Discharge      Electronically signed by:  Jose Knowles, PT, [id number]          Note: If patient does not return for scheduled/ recommended follow up visits, this note will serve as a discharge from care along with most recent update on progress.

## 2022-10-20 NOTE — FLOWSHEET NOTE
[facility], [address] [place]  Phone: [phone]  Fax [phone]  :  Physical Therapy Treatment Note/ Progress Report:           Date:  10/20/2022    Patient Name:  Muriel Galaviz    :  1955  MRN: [id number]      Referring Physician: Angelo Khan MD                                                     Evaluation Date: 2022                                           Date of Referral:22     Insurance: Medicare     Next MD appt: scheduled:9/15/22     Medical Diagnosis:  G21.486 (ICD-10-CM) - S/P total knee arthroplasty, left        Treatment Diagnosis:  M25.662 L knee stiffness; M25.462 L knee effusion;  M25.562 L knee pain; difficulty walking R26.2  S/P L knee TKA 22           Precautions/ Contra-indications:         Has the plan of care been signed (Y/N):        [x]  Yes  []  No    Progress report will be due (10 Rx or 30 days ):   Visit 10 or 22 DONE visit 5 22  DUE visit 15 10/13/22 DONE visit 13       Recertification will be due (POC Duration  / 90 days ): 22     Is this a Progress Report:     [x]  Yes  []  No      If Yes:  Date Range for reporting period:  Beginnin22  Ending: 10/20/22              Visit # Date Range Insurance Allowable Requires auth   IN PERSON 13 22-10/20/22 BMN    [x]no        []yes:   TELEHEALTH       TOTAL              CX/NS       Next PT appt: 10/19/22                PT Practice Pattern:H  Co morbidities:1-2  Surgical:L TKA  DOS 22  Nonsurgical  INITIAL VISIT 22 CURRENT VISIT 10/4/22   PAIN 0-10/10 4-8/10 4-5/10   FUNCTIONAL SCALE FOTO 18/100 44/100   ROM KE -5 0    KF 45 110 after manuals                           STRENGHT (MMT) Quad tone trace Fair    KE  4+/5    KF  4+/5    HF  4+/5                         Latex Allergy/Pacemaker/Adhesive allergy:  [x]NO      []YES     RESTRICTIONS/PRECAUTIONS: protocol         SUBJECTIVE: Pt reports she tried to go up and down the stairs. Pain level:  3/10    OBJECTIVE:   Observation: stiff gait this session. Exercises/Interventions:     HEP:  Francisca  Access Code: [email or web address]  URL: [email or web address]  Date: 08/31/2022  Prepared by: Vladislav Murdock      Therapeutic Ex (44894)  NMR re-education (39454) Reps/Sets/time Notes/CUES/Assessment HEP   See previous flowsheets for HEP                                                      HS cramp x    x                 VC    VC                                      1#    Tang stretch KF stretch 3x20\"     Tang stretch HS stretch 3x20\"           Recumbent full rev for ROM 5'     Incline G stretch BLEs 4x20\"     VC/MC    10/10  2.0  biphasic                         Lateral walk 25ft x2     Forward high knee march 25ft x2     Backward walk 25ft x2     Rhomberg, instep tandem balance  2x30\" ea     Ladder walking w cane on turf 2 laps                       Pt education x5' Stair training          Manual Intervention (92579)                                                      Therapeutic Activity (53540)                                                Therapeutic Exercise and NMR EXR  [x] (42813) Provided verbal/tactile cueing for activities related to strengthening, flexibility, endurance, ROM for improvements in LE, proximal hip, and core control with self care, mobility, lifting, ambulation. [x] (22640) Provided verbal/tactile cueing for activities related to improving balance, coordination, kinesthetic sense, posture, motor skill, proprioception  to assist with LE, proximal hip, and core control in self care, mobility, lifting, ambulation and eccentric single leg control.      NMR and Therapeutic Activities:    [] (13279 or 08572) Provided verbal/tactile cueing for activities related to improving balance, coordination, kinesthetic sense, posture, motor skill, proprioception and motor activation to allow for proper function of core, proximal hip and LE with self care and ADLs  [] (59783) Gait Re-education- Provided training and instruction to the patient for proper LE, core and proximal hip recruitment and positioning and eccentric body weight control with ambulation re-education including up and down stairs     Home Exercise Program:    [x] (28030) Reviewed/Progressed HEP activities related to strengthening, flexibility, endurance, ROM of core, proximal hip and LE for functional self-care, mobility, lifting and ambulation/stair navigation   [] (36411)Reviewed/Progressed HEP activities related to improving balance, coordination, kinesthetic sense, posture, motor skill, proprioception of core, proximal hip and LE for self care, mobility, lifting, and ambulation/stair navigation      Manual Treatments:  PROM / STM / Oscillations-Mobs:  G-I, II, III, IV (PA's, Inf., Post.)  [] (76853) Provided manual therapy to mobilize LE, proximal hip and/or LS spine soft tissue/joints for the purpose of modulating pain, promoting relaxation,  increasing ROM, reducing/eliminating soft tissue swelling/inflammation/restriction, improving soft tissue extensibility and allowing for proper ROM for normal function with self care, mobility, lifting and ambulation. Trigger point Dry Needling:  fine needle insertion into myofascial trigger points to stimulate a healing response  [] (22954) Needle insertion without injection: 1 or 2 muscles  [] (57646) Needle insertion without injection: 3 or more muscles    Modalities:  Pt demonstrates edema and swelling that does not respond to regular conventional RICE and requires use of a compression/vasopneumatic device to reduce. However she declined this visit. [x] GAME READY (VASO)- for significant edema, swelling, pain control.   x10'   [] ESU (HV/PM) for edema and pain control      Charges:  Timed Code Treatment Minutes: 40   Total Treatment Minutes: 50     Medicare total (add KX at $2000)  1300        [] EVAL (LOW) 455 [id number] (typically 20 minutes face-to-face)  [] EVAL (MOD) 61000 (typically 30 minutes face-to-face)  [] EVAL (HIGH) 34789 (typically 45 minutes face-to-face)  [] RE-EVAL     [x] EA(59300) x  1  [] IONTO  [x] NMR (06445) x   2  [x] VASO  [] Manual (94154) x    [] Other:  [] TA x      [] Mech Traction (82537)  [] ES(attended) (89734)     [] ES (un) (49430):             GOALS:      Patient stated goal: to be able to walk   [x] Progressing: [] Met: [] Not Met: [] Adjusted     Therapist goals for Patient:     Short Term Goals: To be achieved in: 2 weeks 2/2 MET    1. Independent in HEP and progression per patient tolerance, in order to prevent re-injury. [] Progressing: [x] Met: [] Not Met: [] Adjusted  2. Patient will have a decrease in pain to facilitate improvement in movement, function, and ADLs as indicated by Functional Deficits. [] Progressing: [x] Met: [] Not Met: [] Adjusted     Long Term Goals: To be achieved in: 12 weeks        1. FOTO score to be equal or greater to the predicted score (57/100)  to assist with reaching prior level of function. [x] Progressing: [] Met: [] Not Met: [] Adjusted      2. Patient will demonstrate increased AROM to 0-120 to allow for getting in and out of car as indicated by patients Functional Deficits. [x] Progressing: [] Met: [] Not Met: [] Adjusted       3. Patient will demonstrate an increase in strength to 4+/5 to allow for sit to stand as indicated by patients Functional Deficits. [x] Progressing: [] Met: [] Not Met: [] Adjusted       4. Patient will return to all transfers, work activities, and functional activities without increased symptoms or restriction, specifically deep water aerobics. [x] Progressing: [] Met: [] Not Met: [] Adjusted       5. (Pt specific functional or activity goal)Pt will be able to walk for 20 minutes w/o walker and w/o increased s/s for grocery shopping. [x] Progressing: [] Met: [] Not Met: [] Adjusted  6.  Pt will demonstrate pain decreased by 50% (0-4/10) with all ADLS. [x] Progressing: [] Met: [] Not Met: [] Adjusted        ASSESSMENT:   Pt tolerated session well. Continued tightness in flexion ROM. Patient will benefit from continued skilled intervention to increase ROM, flexibility, strength and function. Overall Progression Towards Functional goals/ Treatment Progress Update:  [x] Patient is progressing as expected towards functional goals listed. [] Progression is slowed due to complexities/Impairments listed. [] Progression has been slowed due to co-morbidities. [] Plan just implemented, too soon to assess goals progression <30days   [] Goals require adjustment due to lack of progress  [] Patient is not progressing as expected and requires additional follow up with physician  [] Other    Prognosis for POC: [x] Good [] Fair  [] Poor      Patient requires continued skilled intervention: [x] Yes  [] No    Treatment/Activity Tolerance:  [x] Patient able to complete treatment  [] Patient limited by fatigue  [x] Patient limited by pain    [] Patient limited by other medical complications  [] Other:             PLAN: Cont. PT 2x/week. Focus on ROM and strength, balance as well as gait and stairs. [x] Continue per plan of care [] Alter current plan (see comments above)  [] Plan of care initiated [] Hold pending MD visit [] Discharge      Electronically signed by:  Zulema Zhang, PT, [id number]          Note: If patient does not return for scheduled/ recommended follow up visits, this note will serve as a discharge from care along with most recent update on progress.

## 2022-10-25 ENCOUNTER — HOSPITAL ENCOUNTER (OUTPATIENT)
Dept: PHYSICAL THERAPY | Age: 67
Setting detail: THERAPIES SERIES
Discharge: HOME | End: 2022-10-25
Payer: MEDICARE

## 2022-10-25 PROCEDURE — 97140 MANUAL THERAPY 1/> REGIONS: CPT | Performed by: PHYSICAL THERAPIST

## 2022-10-25 PROCEDURE — 97112 NEUROMUSCULAR REEDUCATION: CPT | Performed by: PHYSICAL THERAPIST

## 2022-10-25 PROCEDURE — 97110 THERAPEUTIC EXERCISES: CPT | Performed by: PHYSICAL THERAPIST

## 2022-10-25 NOTE — FLOWSHEET NOTE
[facility], [address]  Phone: [phone]  Fax [phone]  :  Physical Therapy Treatment Note/ Progress Report:           Date:  10/25/2022    Patient Name:  Sharyn Brown    :  1955  MRN: [id number]      Referring Physician: Jose De Jesus Fortune MD                                                     Evaluation Date: 2022                                           Date of Referral:22     Insurance: Medicare     Next MD appt: scheduled:9/15/22     Medical Diagnosis:  K29.073 (ICD-10-CM) - S/P total knee arthroplasty, left        Treatment Diagnosis:  M25.662 L knee stiffness; M25.462 L knee effusion;  M25.562 L knee pain; difficulty walking R26.2  S/P L knee TKA 22           Precautions/ Contra-indications:         Has the plan of care been signed (Y/N):        [x]  Yes  []  No    Progress report will be due (10 Rx or 30 days ):   Visit 10 or 22 DONE visit 5 22  DUE visit 15 10/13/22 DONE visit 13 10/20/22  DUE visit 23       Recertification will be due (POC Duration  / 90 days ): 22     Is this a Progress Report:     []  Yes  [x]  No      If Yes:  Date Range for reporting period:  Beginnin22  Ending: 10/20/22              Visit # Date Range Insurance Allowable Requires auth   IN PERSON 14 22- BMN    [x]no        []yes:   TELEHEALTH       TOTAL              CX/NS       Next PT appt:                 PT Practice Pattern:H  Co morbidities:1-2  Surgical:L TKA  DOS 22  Nonsurgical  INITIAL VISIT 22 CURRENT VISIT 10/4/22   PAIN 0-10/10 4-8/10 4-5/10   FUNCTIONAL SCALE FOTO 18/100 44/100   ROM KE -5 0    KF 45 110 after manuals                           STRENGHT (MMT) Quad tone trace Fair    KE  4+/5    KF  4+/5    HF  4+/5                         Latex Allergy/Pacemaker/Adhesive allergy:  [x]NO      []YES     RESTRICTIONS/PRECAUTIONS: protocol         SUBJECTIVE:  Pt reports she feels very stiff. Pain level:  3/10    OBJECTIVE:   Observation: stiff gait this session. Difficulty w flexion. Exercises/Interventions:     HEP:  Francisca  Access Code: [email or web address]  URL: [email or web address]  Date: 08/31/2022  Prepared by: Claude Polanco      Therapeutic Ex (81614)  NMR re-education (92516) Reps/Sets/time Notes/CUES/Assessment HEP   See previous flowsheets for HEP                                                      HS cramp x    x                 VC    VC                                      1#                  Recumbent full rev for ROM 5'     Incline G stretch BLEs 4x20\"     VC/MC    10/10  2.0  biphasic                                             Squats w pro slant, heel up, N, toe up 15x ea                 Pt education x6' Expected progress over the next 3-6 months          Manual Intervention (43908)      Roller to ITB,VL,quad, HS 8'     Manual PROM KF supine 5'         Prone KF PROM 5'     Manual HS/ITB and hip stretch 2'                             Therapeutic Activity (76874)                                                Therapeutic Exercise and NMR EXR  [x] (59433) Provided verbal/tactile cueing for activities related to strengthening, flexibility, endurance, ROM for improvements in LE, proximal hip, and core control with self care, mobility, lifting, ambulation. [x] (76557) Provided verbal/tactile cueing for activities related to improving balance, coordination, kinesthetic sense, posture, motor skill, proprioception  to assist with LE, proximal hip, and core control in self care, mobility, lifting, ambulation and eccentric single leg control.      NMR and Therapeutic Activities:    [] (61563 or 08259) Provided verbal/tactile cueing for activities related to improving balance, coordination, kinesthetic sense, posture, motor skill, proprioception and motor activation to allow for proper function of core, proximal hip and LE with self care and ADLs  [] (03080) Gait Re-education- Provided training and instruction to the patient for proper LE, core and proximal hip recruitment and positioning and eccentric body weight control with ambulation re-education including up and down stairs     Home Exercise Program:    [x] (04841) Reviewed/Progressed HEP activities related to strengthening, flexibility, endurance, ROM of core, proximal hip and LE for functional self-care, mobility, lifting and ambulation/stair navigation   [] (18769)Reviewed/Progressed HEP activities related to improving balance, coordination, kinesthetic sense, posture, motor skill, proprioception of core, proximal hip and LE for self care, mobility, lifting, and ambulation/stair navigation      Manual Treatments:  PROM / STM / Oscillations-Mobs:  G-I, II, III, IV (PA's, Inf., Post.)  [] (50128) Provided manual therapy to mobilize LE, proximal hip and/or LS spine soft tissue/joints for the purpose of modulating pain, promoting relaxation,  increasing ROM, reducing/eliminating soft tissue swelling/inflammation/restriction, improving soft tissue extensibility and allowing for proper ROM for normal function with self care, mobility, lifting and ambulation. Trigger point Dry Needling:  fine needle insertion into myofascial trigger points to stimulate a healing response  [] (07598) Needle insertion without injection: 1 or 2 muscles  [] (51631) Needle insertion without injection: 3 or more muscles    Modalities:  Pt demonstrates edema and swelling that does not respond to regular conventional RICE and requires use of a compression/vasopneumatic device to reduce. However she declined this visit. [] GAME READY (VASO)- for significant edema, swelling, pain control.   x10'   [] ESU (HV/PM) for edema and pain control      Charges:  Timed Code Treatment Minutes: 40   Total Treatment Minutes: 40     Medicare total (add KX at $2000)  1400        [] EVAL (LOW) 88413 (typically 20 minutes face-to-face)  [] EVAL (MOD) 89674 (typically 30 minutes face-to-face)  [] EVAL (HIGH) 16641 (typically 45 minutes face-to-face)  [] RE-EVAL     [x] WO(37411) x  1  [] IONTO  [x] NMR (69625) x   1  [x] VASO  [x] Manual (57688) x  1  [] Other:  [] TA x      [] Mech Traction (67149)  [] ES(attended) (52945)     [] ES (un) (63870):             GOALS:      Patient stated goal: to be able to walk   [x] Progressing: [] Met: [] Not Met: [] Adjusted     Therapist goals for Patient:     Short Term Goals: To be achieved in: 2 weeks 2/2 MET    1. Independent in HEP and progression per patient tolerance, in order to prevent re-injury. [] Progressing: [x] Met: [] Not Met: [] Adjusted  2. Patient will have a decrease in pain to facilitate improvement in movement, function, and ADLs as indicated by Functional Deficits. [] Progressing: [x] Met: [] Not Met: [] Adjusted     Long Term Goals: To be achieved in: 12 weeks        1. FOTO score to be equal or greater to the predicted score (57/100)  to assist with reaching prior level of function. [x] Progressing: [] Met: [] Not Met: [] Adjusted      2. Patient will demonstrate increased AROM to 0-120 to allow for getting in and out of car as indicated by patients Functional Deficits. [x] Progressing: [] Met: [] Not Met: [] Adjusted       3. Patient will demonstrate an increase in strength to 4+/5 to allow for sit to stand as indicated by patients Functional Deficits. [x] Progressing: [] Met: [] Not Met: [] Adjusted       4. Patient will return to all transfers, work activities, and functional activities without increased symptoms or restriction, specifically deep water aerobics. [x] Progressing: [] Met: [] Not Met: [] Adjusted       5. (Pt specific functional or activity goal)Pt will be able to walk for 20 minutes w/o walker and w/o increased s/s for grocery shopping. [x] Progressing: [] Met: [] Not Met: [] Adjusted  6.  Pt will demonstrate pain decreased by 50%  (0-4/10) with all ADLS. [x] Progressing: [] Met: [] Not Met: [] Adjusted        ASSESSMENT:   Pt tolerated session well excpet she struggled with PRM FLX. This has seemed to plateaued at this time. .  Continued tightness in flexion ROM. Patient will benefit from continued skilled intervention to increase ROM, flexibility, strength and function. Overall Progression Towards Functional goals/ Treatment Progress Update:  [x] Patient is progressing as expected towards functional goals listed. [] Progression is slowed due to complexities/Impairments listed. [] Progression has been slowed due to co-morbidities. [] Plan just implemented, too soon to assess goals progression <30days   [] Goals require adjustment due to lack of progress  [] Patient is not progressing as expected and requires additional follow up with physician  [] Other    Prognosis for POC: [x] Good [] Fair  [] Poor      Patient requires continued skilled intervention: [x] Yes  [] No    Treatment/Activity Tolerance:  [x] Patient able to complete treatment  [] Patient limited by fatigue  [x] Patient limited by pain    [] Patient limited by other medical complications  [] Other:             PLAN: Cont. PT 2x/week. Focus on ROM and strength, balance as well as gait and stairs. [x] Continue per plan of care [] Alter current plan (see comments above)  [] Plan of care initiated [] Hold pending MD visit [] Discharge      Electronically signed by:  Johanny Hale PT, [id number]          Note: If patient does not return for scheduled/ recommended follow up visits, this note will serve as a discharge from care along with most recent update on progress.

## 2022-10-27 ENCOUNTER — HOSPITAL ENCOUNTER (OUTPATIENT)
Dept: PHYSICAL THERAPY | Age: 67
Setting detail: THERAPIES SERIES
Discharge: HOME | End: 2022-10-27
Payer: MEDICARE

## 2022-10-27 PROCEDURE — 97112 NEUROMUSCULAR REEDUCATION: CPT | Performed by: PHYSICAL THERAPIST

## 2022-10-27 PROCEDURE — 97110 THERAPEUTIC EXERCISES: CPT | Performed by: PHYSICAL THERAPIST

## 2022-10-27 RX ORDER — MELOXICAM 15 MG/1
TABLET ORAL
Qty: 30 TABLET | Refills: 0 | Status: SHIPPED | OUTPATIENT
Start: 2022-10-27 | End: 2022-11-28

## 2022-10-27 NOTE — FLOWSHEET NOTE
[facility], [address] [place]  Phone: [phone]  Fax [phone]  :  Physical Therapy Treatment Note/ Progress Report:           Date:  10/27/2022    Patient Name:  Chicho Hernandez    :  1955  MRN: [id number]      Referring Physician: Brian Turk MD                                                     Evaluation Date: 2022                                           Date of Referral:22     Insurance: Medicare     Next MD appt: scheduled:9/15/22     Medical Diagnosis:  H25.144 (ICD-10-CM) - S/P total knee arthroplasty, left        Treatment Diagnosis:  M25.662 L knee stiffness; M25.462 L knee effusion;  M25.562 L knee pain; difficulty walking R26.2  S/P L knee TKA 22           Precautions/ Contra-indications:         Has the plan of care been signed (Y/N):        [x]  Yes  []  No    Progress report will be due (10 Rx or 30 days ):   Visit 10 or 22 DONE visit 5 22  DUE visit 15 10/13/22 DONE visit 13 10/20/22  DUE visit 23       Recertification will be due (POC Duration  / 90 days ): 22     Is this a Progress Report:     []  Yes  [x]  No      If Yes:  Date Range for reporting period:  Beginnin22  Ending: 10/20/22              Visit # Date Range Insurance Allowable Requires auth   IN PERSON 15 22- BMN    [x]no        []yes:   TELEHEALTH       TOTAL              CX/NS       Next PT appt:                 PT Practice Pattern:H  Co morbidities:1-2  Surgical:L TKA  DOS 22  Nonsurgical  INITIAL VISIT 22 CURRENT VISIT 10/4/22   PAIN 0-10/10 4-8/10 4-5/10   FUNCTIONAL SCALE FOTO 18/100 44/100   ROM KE -5 0    KF 45 110 after manuals                           STRENGHT (MMT) Quad tone trace Fair    KE  4+/5    KF  4+/5    HF  4+/5                         Latex Allergy/Pacemaker/Adhesive allergy:  [x]NO      []YES     RESTRICTIONS/PRECAUTIONS: protocol         SUBJECTIVE:  Pt reports she feels muscle sore in both legs and stiff. Pain level:  3/10    OBJECTIVE:   Observation: stiff gait this session prior to training. KF AROM on machine AROM 96. Exercises/Interventions:     HEP:  [email or web address]  Access Code: [email or web address]  URL: [email or web address]  Date: 08/31/2022  Prepared by: Daya Swift      Therapeutic Ex (90789)  NMR re-education (58529) Reps/Sets/time Notes/CUES/Assessment HEP   See previous flowsheets for HEP                                                Supine march for HF 2#  Alt R/L 20x  LLE 12x Cues for KF and not rotating pelvis    NV    NV    NV            VC    VC          Gait training w [facility] no HAB, stepping on straight line, walking oin straight line, using mirror  6'                             1#                  Recumbent full rev for ROM 6'     Incline G stretch BLEs 4x20\"                             LP BLE 60#  2x12     KF BLE 20#  2x12                                             Pt education x6' Expected progress over the next 3-6 months          Manual Intervention (06165)                                                  Therapeutic Activity (12499)                                                Therapeutic Exercise and NMR EXR  [x] (66627) Provided verbal/tactile cueing for activities related to strengthening, flexibility, endurance, ROM for improvements in LE, proximal hip, and core control with self care, mobility, lifting, ambulation. [x] (43923) Provided verbal/tactile cueing for activities related to improving balance, coordination, kinesthetic sense, posture, motor skill, proprioception  to assist with LE, proximal hip, and core control in self care, mobility, lifting, ambulation and eccentric single leg control.      NMR and Therapeutic Activities:    [] (44668 or 88227) Provided verbal/tactile cueing for activities related to improving balance, coordination, kinesthetic sense, posture, motor skill, proprioception and motor activation to allow for proper function of core, proximal hip and LE with self care and ADLs  [] (27162) Gait Re-education- Provided training and instruction to the patient for proper LE, core and proximal hip recruitment and positioning and eccentric body weight control with ambulation re-education including up and down stairs     Home Exercise Program:    [x] (13022) Reviewed/Progressed HEP activities related to strengthening, flexibility, endurance, ROM of core, proximal hip and LE for functional self-care, mobility, lifting and ambulation/stair navigation   [] (50014)Reviewed/Progressed HEP activities related to improving balance, coordination, kinesthetic sense, posture, motor skill, proprioception of core, proximal hip and LE for self care, mobility, lifting, and ambulation/stair navigation      Manual Treatments:  PROM / STM / Oscillations-Mobs:  G-I, II, III, IV (PA's, Inf., Post.)  [] (34537) Provided manual therapy to mobilize LE, proximal hip and/or LS spine soft tissue/joints for the purpose of modulating pain, promoting relaxation,  increasing ROM, reducing/eliminating soft tissue swelling/inflammation/restriction, improving soft tissue extensibility and allowing for proper ROM for normal function with self care, mobility, lifting and ambulation. Trigger point Dry Needling:  fine needle insertion into myofascial trigger points to stimulate a healing response  [] (44021) Needle insertion without injection: 1 or 2 muscles  [] (59691) Needle insertion without injection: 3 or more muscles    Modalities:  Pt demonstrates edema and swelling that does not respond to regular conventional RICE and requires use of a compression/vasopneumatic device to reduce. However she declined this visit. [] GAME READY (VASO)- for significant edema, swelling, pain control.   x10'   [] ESU (HV/PM) for edema and pain control      Charges:  Timed Code Treatment Minutes: 45   Total Treatment Minutes: 45     Medicare total (add KX at $2000)  1400        [] EVAL (LOW) 64917 (typically 20 minutes face-to-face)  [] EVAL (MOD) 68110 (typically 30 minutes face-to-face)  [] EVAL (HIGH) 11251 (typically 45 minutes face-to-face)  [] RE-EVAL     [x] WD(17004) x  2  [] IONTO  [x] NMR (27314) x   1  [] VASO  [] Manual (92430) x    [] Other:  [] TA x      [] Mech Traction (38870)  [] ES(attended) (59678)     [] ES (un) (16680):             GOALS:      Patient stated goal: to be able to walk   [x] Progressing: [] Met: [] Not Met: [] Adjusted     Therapist goals for Patient:     Short Term Goals: To be achieved in: 2 weeks 2/2 MET    1. Independent in HEP and progression per patient tolerance, in order to prevent re-injury. [] Progressing: [x] Met: [] Not Met: [] Adjusted  2. Patient will have a decrease in pain to facilitate improvement in movement, function, and ADLs as indicated by Functional Deficits. [] Progressing: [x] Met: [] Not Met: [] Adjusted     Long Term Goals: To be achieved in: 12 weeks        1. FOTO score to be equal or greater to the predicted score (57/100)  to assist with reaching prior level of function. [x] Progressing: [] Met: [] Not Met: [] Adjusted      2. Patient will demonstrate increased AROM to 0-120 to allow for getting in and out of car as indicated by patients Functional Deficits. [x] Progressing: [] Met: [] Not Met: [] Adjusted       3. Patient will demonstrate an increase in strength to 4+/5 to allow for sit to stand as indicated by patients Functional Deficits. [x] Progressing: [] Met: [] Not Met: [] Adjusted       4. Patient will return to all transfers, work activities, and functional activities without increased symptoms or restriction, specifically deep water aerobics. [x] Progressing: [] Met: [] Not Met: [] Adjusted       5. (Pt specific functional or activity goal)Pt will be able to walk for 20 minutes w/o walker and w/o increased s/s for grocery shopping.    [x] Progressing: [] Met: [] Not Met: [] Adjusted  6. Pt will demonstrate pain decreased by 50%  (0-4/10) with all ADLS. [x] Progressing: [] Met: [] Not Met: [] Adjusted        ASSESSMENT:   Pt tolerated session well and gait was much improved after session. Continued tightness in flexion ROM. Patient will benefit from continued skilled intervention to increase ROM, flexibility, strength and function. Overall Progression Towards Functional goals/ Treatment Progress Update:  [x] Patient is progressing as expected towards functional goals listed. [] Progression is slowed due to complexities/Impairments listed. [] Progression has been slowed due to co-morbidities. [] Plan just implemented, too soon to assess goals progression <30days   [] Goals require adjustment due to lack of progress  [] Patient is not progressing as expected and requires additional follow up with physician  [] Other    Prognosis for POC: [x] Good [] Fair  [] Poor      Patient requires continued skilled intervention: [x] Yes  [] No    Treatment/Activity Tolerance:  [x] Patient able to complete treatment  [] Patient limited by fatigue  [x] Patient limited by pain    [] Patient limited by other medical complications  [] Other:             PLAN: Cont. PT 2x/week. Focus on ROM and strength, balance as well as gait and stairs. [x] Continue per plan of care [] Alter current plan (see comments above)  [] Plan of care initiated [] Hold pending MD visit [] Discharge      Electronically signed by:  José Miguel Chow, PT, [id number]          Note: If patient does not return for scheduled/ recommended follow up visits, this note will serve as a discharge from care along with most recent update on progress.

## 2022-10-31 ENCOUNTER — HOSPITAL ENCOUNTER (OUTPATIENT)
Dept: PHYSICAL THERAPY | Age: 67
Setting detail: THERAPIES SERIES
Discharge: HOME | End: 2022-10-31
Payer: MEDICARE

## 2022-10-31 ENCOUNTER — CARE COORDINATION (OUTPATIENT)
Dept: CASE MANAGEMENT | Age: 67
End: 2022-10-31

## 2022-10-31 PROCEDURE — 97110 THERAPEUTIC EXERCISES: CPT | Performed by: PHYSICAL THERAPIST

## 2022-10-31 PROCEDURE — 97112 NEUROMUSCULAR REEDUCATION: CPT | Performed by: PHYSICAL THERAPIST

## 2022-10-31 NOTE — CARE COORDINATION
Spoke with patient. Incision status: States free from redness or drainage. Edema/Swelling: States swelling has improved, has stiffness. Pain level and status: States pain is tolerable. Bowels: No complaints. Outpatient therapy: Continues, and will decrease to once a week next week.     Do you have all of your medications: Yes    Changes in medications: No    Follow up appointments:    Future Appointments   Date Time Provider Cris Oates   11/3/2022  1:15 PM Narciso Roland, PT [facility] AND PT Alondra Yvonne H[facility]   11/3/2022  3:00 PM Monica Tucker, APRN - CNP [facility] [place] - DYD   11/7/2022 10:15 AM Narciso Roland, PT [facility] AND PT Alondra Yvonne HOD   11/10/2022 11:45 AM Narciso Roland, PT [facility] AND PT Alondra Yvonen HOD   11/14/2022 10:15 AM Narciso Roland, PT [facility] AND PT [facility]   11/21/2022 10:15 AM Narciso Roland, PT [facility] AND PT [facility]anne [facility]   11/28/2022 10:15 AM Narciso Roland, PT [facility] AND PT [facility]anne [facility]   12/8/2022  9:15 AM Carmen Sky MD AND ORTHO ANDREW Smith BSN  Care Transition Nurse for [facility]  [phone]

## 2022-10-31 NOTE — FLOWSHEET NOTE
[facility],  [address] [place]  Phone: [phone]  Fax [phone]  :  Physical Therapy Treatment Note/ Progress Report:           Date:  10/31/2022    Patient Name:  Ilana Lopez    :  1955  MRN: [id number]      Referring Physician: Karolyn Yadav MD                                                     Evaluation Date: 2022                                           Date of Referral:22     Insurance: Medicare     Next MD appt: scheduled:9/15/22     Medical Diagnosis:  D74.105 (ICD-10-CM) - S/P total knee arthroplasty, left        Treatment Diagnosis:  M25.662 L knee stiffness; M25.462 L knee effusion;  M25.562 L knee pain; difficulty walking R26.2  S/P L knee TKA 22           Precautions/ Contra-indications:         Has the plan of care been signed (Y/N):        [x]  Yes  []  No    Progress report will be due (10 Rx or 30 days ):   Visit 10 or 22 DONE visit 5 22  DUE visit 15 10/13/22 DONE visit 13 10/20/22  DUE visit 23       Recertification will be due (POC Duration  / 90 days ): 22     Is this a Progress Report:     []  Yes  [x]  No      If Yes:  Date Range for reporting period:  Beginnin22  Ending: 10/20/22              Visit # Date Range Insurance Allowable Requires auth   IN PERSON 15 22- BMN    [x]no        []yes:   TELEHEALTH       TOTAL              CX/NS       Next PT appt: 11/3/22                PT Practice Pattern:H  Co morbidities:1-2  Surgical:L TKA  DOS 22  Nonsurgical  INITIAL VISIT 22 CURRENT VISIT 10/4/22   PAIN 0-10/10 4-8/10 4-5/10   FUNCTIONAL SCALE FOTO 18/100 44/100   ROM KE -5 0    KF 45 110 after manuals                           STRENGHT (MMT) Quad tone trace Fair    KE  4+/5    KF  4+/5    HF  4+/5                         Latex Allergy/Pacemaker/Adhesive allergy:  [x]NO      []YES     RESTRICTIONS/PRECAUTIONS: protocol         SUBJECTIVE:  Pt reports she feels muscle sore in both legs and stiff. She di not sleep well last night ran out of Meloxicam.     Pain level:  3/10    OBJECTIVE:   Observation:           Exercises/Interventions:     HEP:  [employer]  Access Code: [email or web address]  URL: [email or web address]  Date: 08/31/2022  Prepared by: Estrella Benitez Ex ([id number])  NMR re-education (85376) Reps/Sets/time Notes/CUES/Assessment HEP   See previous flowsheets for HEP                                                Cues for KF and not rotating pelvis    NV    NV    NV            VC    VC          Gait training w [facility] no HAB, stepping on straight line, walking oin straight line, using mirror  6'                             1#                  Recumbent full rev for ROM 6'  2 position     Incline G stretch BLEs 4x20\"     Reformer  Squat w ball ADD  BHR w QS/GS  Squat w pilates ABD 1R1B  3' then 10 rapid  1.5'  3' then 10 rapid     Sit to stand from table Med height 10x  Low height 10x  Partial stand 10x5\"                                                                 Pt education x6' Expected progress over the next 3-6 months          Manual Intervention ([id number])                                                  Therapeutic Activity (65986)                                                Therapeutic Exercise and NMR EXR  [x] (17166) Provided verbal/tactile cueing for activities related to strengthening, flexibility, endurance, ROM for improvements in LE, proximal hip, and core control with self care, mobility, lifting, ambulation. [x] (97320) Provided verbal/tactile cueing for activities related to improving balance, coordination, kinesthetic sense, posture, motor skill, proprioception  to assist with LE, proximal hip, and core control in self care, mobility, lifting, ambulation and eccentric single leg control.      NMR and Therapeutic Activities:    [] (98290 or 00602) Provided verbal/tactile cueing for activities related to improving balance, coordination, kinesthetic sense, posture, motor skill, proprioception and motor activation to allow for proper function of core, proximal hip and LE with self care and ADLs  [] (60362) Gait Re-education- Provided training and instruction to the patient for proper LE, core and proximal hip recruitment and positioning and eccentric body weight control with ambulation re-education including up and down stairs     Home Exercise Program:    [x] (99099) Reviewed/Progressed HEP activities related to strengthening, flexibility, endurance, ROM of core, proximal hip and LE for functional self-care, mobility, lifting and ambulation/stair navigation   [] (88725)Reviewed/Progressed HEP activities related to improving balance, coordination, kinesthetic sense, posture, motor skill, proprioception of core, proximal hip and LE for self care, mobility, lifting, and ambulation/stair navigation      Manual Treatments:  PROM / STM / Oscillations-Mobs:  G-I, II, III, IV (PA's, Inf., Post.)  [] (68911) Provided manual therapy to mobilize LE, proximal hip and/or LS spine soft tissue/joints for the purpose of modulating pain, promoting relaxation,  increasing ROM, reducing/eliminating soft tissue swelling/inflammation/restriction, improving soft tissue extensibility and allowing for proper ROM for normal function with self care, mobility, lifting and ambulation. Trigger point Dry Needling:  fine needle insertion into myofascial trigger points to stimulate a healing response  [] (39448) Needle insertion without injection: 1 or 2 muscles  [] (51941) Needle insertion without injection: 3 or more muscles    Modalities:  declined     [] GAME READY (VASO)- for significant edema, swelling, pain control.   x10'   [] ESU (HV/PM) for edema and pain control      Charges:  Timed Code Treatment Minutes: 45   Total Treatment Minutes: 45     Medicare total (add KX at $2000)  [facility] DOCUMENTATION      I certify that this patient meets one of the below criteria necessary for becoming an exception to the Medicare cap on therapy services:    [x]  The patient has a condition identified by an ICD-10 code that has a direct and significant impact on the need for therapy. (Significantly impacts the rate of recovery.)                   []  The patient has a complexity identified by an ICD-10 code that has a direct and significant impact on the need for therapy. (Significantly impacts the rate of recovery and is associated with a primary condition.)         []  The patient has associated variables that influence the amount of treatment to include:  Social support, self-efficacy/motivation, prognosis, time since onset/acuity. []  The patient has generalized musculoskeletal conditions or a condition affecting multiple sites that will have a direct impact on the rate of recovery. []  The patient had a prior episode of outpatient therapy during this calendar year for a different condition. []  The patient has a mental or cognitive disorder in addition to the condition being treated that will have a direct and significant impact on the rate of recovery. [] EVAL (LOW) 74671 (typically 20 minutes face-to-face)  [] EVAL (MOD) 31928 (typically 30 minutes face-to-face)  [] EVAL (HIGH) 97544 (typically 45 minutes face-to-face)  [] RE-EVAL     [x] LN(08817) x  2  [] IONTO  [x] NMR (60703) x   1  [] VASO  [] Manual (66573) x    [] Other:  [] TA x      [] Mech Traction (05261)  [] ES(attended) (54500)     [] ES (un) (17175):             GOALS:      Patient stated goal: to be able to walk   [x] Progressing: [] Met: [] Not Met: [] Adjusted     Therapist goals for Patient:     Short Term Goals: To be achieved in: 2 weeks 2/2 MET    1. Independent in HEP and progression per patient tolerance, in order to prevent re-injury. [] Progressing: [x] Met: [] Not Met: [] Adjusted  2.  Patient will have a decrease in pain to facilitate improvement in movement, function, and ADLs as indicated by Functional Deficits. [] Progressing: [x] Met: [] Not Met: [] Adjusted     Long Term Goals: To be achieved in: 12 weeks        1. FOTO score to be equal or greater to the predicted score (57/100)  to assist with reaching prior level of function. [x] Progressing: [] Met: [] Not Met: [] Adjusted      2. Patient will demonstrate increased AROM to 0-120 to allow for getting in and out of car as indicated by patients Functional Deficits. [x] Progressing: [] Met: [] Not Met: [] Adjusted       3. Patient will demonstrate an increase in strength to 4+/5 to allow for sit to stand as indicated by patients Functional Deficits. [x] Progressing: [] Met: [] Not Met: [] Adjusted       4. Patient will return to all transfers, work activities, and functional activities without increased symptoms or restriction, specifically deep water aerobics. [x] Progressing: [] Met: [] Not Met: [] Adjusted       5. (Pt specific functional or activity goal)Pt will be able to walk for 20 minutes w/o walker and w/o increased s/s for grocery shopping. [x] Progressing: [] Met: [] Not Met: [] Adjusted  6. Pt will demonstrate pain decreased by 50%  (0-4/10) with all ADLS. [x] Progressing: [] Met: [] Not Met: [] Adjusted        ASSESSMENT:   Pt tolerated session well and gait was much improved after session. Continued tightness in flexion ROM. Patient will benefit from continued skilled intervention to increase ROM, flexibility, strength and function. Overall Progression Towards Functional goals/ Treatment Progress Update:  [x] Patient is progressing as expected towards functional goals listed. [] Progression is slowed due to complexities/Impairments listed. [] Progression has been slowed due to co-morbidities.   [] Plan just implemented, too soon to assess goals progression <30days   [] Goals require adjustment due to lack of progress  [] Patient is not progressing as expected and requires additional follow up with physician  [] Other    Prognosis for POC: [x] Good [] Fair  [] Poor      Patient requires continued skilled intervention: [x] Yes  [] No    Treatment/Activity Tolerance:  [x] Patient able to complete treatment  [] Patient limited by fatigue  [x] Patient limited by pain    [] Patient limited by other medical complications  [] Other:             PLAN: Cont. PT 2x/week. Focus on ROM and strength, balance as well as gait and stairs. [x] Continue per plan of care [] Alter current plan (see comments above)  [] Plan of care initiated [] Hold pending MD visit [] Discharge      Electronically signed by:  Clay Hunter, PT, [id number]          Note: If patient does not return for scheduled/ recommended follow up visits, this note will serve as a discharge from care along with most recent update on progress.

## 2022-11-03 ENCOUNTER — OFFICE VISIT (OUTPATIENT)
Dept: FAMILY MEDICINE CLINIC | Age: 67
End: 2022-11-03
Payer: MEDICARE

## 2022-11-03 ENCOUNTER — HOSPITAL ENCOUNTER (OUTPATIENT)
Dept: PHYSICAL THERAPY | Age: 67
Setting detail: THERAPIES SERIES
Discharge: HOME | End: 2022-11-03
Payer: MEDICARE

## 2022-11-03 VITALS
RESPIRATION RATE: 16 BRPM | HEART RATE: 91 BPM | SYSTOLIC BLOOD PRESSURE: 130 MMHG | DIASTOLIC BLOOD PRESSURE: 82 MMHG | HEIGHT: 64 IN | WEIGHT: 178.2 LBS | BODY MASS INDEX: 30.42 KG/M2 | OXYGEN SATURATION: 97 %

## 2022-11-03 DIAGNOSIS — R73.03 PREDIABETES: ICD-10-CM

## 2022-11-03 DIAGNOSIS — Z00.00 INITIAL MEDICARE ANNUAL WELLNESS VISIT: ICD-10-CM

## 2022-11-03 DIAGNOSIS — Z78.0 POST-MENOPAUSAL: Primary | ICD-10-CM

## 2022-11-03 DIAGNOSIS — E78.00 PURE HYPERCHOLESTEROLEMIA: ICD-10-CM

## 2022-11-03 PROCEDURE — 1123F ACP DISCUSS/DSCN MKR DOCD: CPT | Performed by: REGISTERED NURSE

## 2022-11-03 PROCEDURE — G8484 FLU IMMUNIZE NO ADMIN: HCPCS | Performed by: REGISTERED NURSE

## 2022-11-03 PROCEDURE — G0438 PPPS, INITIAL VISIT: HCPCS | Performed by: REGISTERED NURSE

## 2022-11-03 PROCEDURE — 97110 THERAPEUTIC EXERCISES: CPT | Performed by: PHYSICAL THERAPIST

## 2022-11-03 PROCEDURE — 97112 NEUROMUSCULAR REEDUCATION: CPT | Performed by: PHYSICAL THERAPIST

## 2022-11-03 PROCEDURE — 97140 MANUAL THERAPY 1/> REGIONS: CPT | Performed by: PHYSICAL THERAPIST

## 2022-11-03 PROCEDURE — 3017F COLORECTAL CA SCREEN DOC REV: CPT | Performed by: REGISTERED NURSE

## 2022-11-03 RX ORDER — M-VIT,TX,IRON,MINS/CALC/FOLIC 27MG-0.4MG
1 TABLET ORAL DAILY
COMMUNITY

## 2022-11-03 ASSESSMENT — PATIENT HEALTH QUESTIONNAIRE - PHQ9
SUM OF ALL RESPONSES TO PHQ9 QUESTIONS 1 & 2: 0
SUM OF ALL RESPONSES TO PHQ QUESTIONS 1-9: 0
1. LITTLE INTEREST OR PLEASURE IN DOING THINGS: 0
SUM OF ALL RESPONSES TO PHQ QUESTIONS 1-9: 0
SUM OF ALL RESPONSES TO PHQ QUESTIONS 1-9: 0
2. FEELING DOWN, DEPRESSED OR HOPELESS: 0
SUM OF ALL RESPONSES TO PHQ QUESTIONS 1-9: 0

## 2022-11-03 ASSESSMENT — LIFESTYLE VARIABLES
HOW OFTEN DO YOU HAVE A DRINK CONTAINING ALCOHOL: NEVER
HOW MANY STANDARD DRINKS CONTAINING ALCOHOL DO YOU HAVE ON A TYPICAL DAY: PATIENT DOES NOT DRINK

## 2022-11-03 NOTE — PROGRESS NOTES
Medicare Annual Wellness Visit    Johnny Negron is here for Medicare AWV    Assessment & Plan   Initial Medicare annual wellness visit  Post-menopausal  Schedule DEXA. -     DEXA Bone Density Axial Skeleton; Future  Pure hypercholesterolemia  Have blood work drawn once fasting.   -     Lipid Panel; Future  Prediabetes  Last A1c 7/18/22       Recommendations for Preventive Services Due: see orders and patient instructions/AVS.  Recommended screening schedule for the next 5-10 years is provided to the patient in written form: see Patient Instructions/AVS.     Return in 1 year (on 11/3/2023) for Medicare Annual Wellness Visit in 1 year. Subjective   The following acute and/or chronic problems were also addressed today:    She is recovering after knee surgery. She says she is getting better and goes to PT twice a week. Patient's complete Health Risk Assessment and screening values have been reviewed and are found in Flowsheets. The following problems were reviewed today and where indicated follow up appointments were made and/or referrals ordered.     Positive Risk Factor Screenings with Interventions:             General Health and ACP:  General  In general, how would you say your health is?: Very Good  In the past 7 days, have you experienced any of the following: New or Increased Pain, New or Increased Fatigue, Loneliness, Social Isolation, Stress or Anger?: No  Do you get the social and emotional support that you need?: Yes  Do you have a Living Will?: Yes    Advance Directives       Power of  Living Will ACP-Advance Directive ACP-Power of     Not on File Not on File Not on File Not on File        General Health Risk Interventions:  No issues, working on recovering after knee surgery    Health Habits/Nutrition:  Physical Activity: Sufficiently Active    Days of Exercise per Week: 7 days    Minutes of Exercise per Session: 30 min     Have you lost any weight without trying in the past 3 months?: No  Body mass index: (!) 30.58  Have you seen the dentist within the past year?: Yes  Health Habits/Nutrition Interventions:  Goes to PT twice a week and is walking at home and does her PT exercises at home, she was just cleared to return to water aerobics    Hearing/Vision:  Do you or your family notice any trouble with your hearing that hasn't been managed with hearing aids?: No  Do you have difficulty driving, watching TV, or doing any of your daily activities because of your eyesight?: No  Have you had an eye exam within the past year?: (!) No  No results found. Hearing/Vision Interventions:  Vision concerns:  patient encouraged to make appointment with his/her eye specialist    Safety:  Do you have working smoke detectors?: Yes  Do you have any tripping hazards - loose or unsecured carpets or rugs?: No  Do you have any tripping hazards - clutter in doorways, halls, or stairs?: No  Do you have either shower bars, grab bars, non-slip mats or non-slip surfaces in your shower or bathtub?: (!) No  Do all of your stairways have a railing or banister?: Yes  Do you always fasten your seatbelt when you are in a car?: Yes  Safety Interventions:  Home safety tips provided           Objective   Vitals:    11/03/22 1454   BP: 130/82   Site: Left Upper Arm   Position: Sitting   Cuff Size: Medium Adult   Pulse: 91   Resp: 16   SpO2: 97%   Weight: 178 lb 3.2 oz (80.8 kg)   Height: 5' 4\" (1.626 m)      Body mass index is 30.59 kg/m².       General Appearance: alert and oriented to person, place and time, well developed and well- nourished, in no acute distress  Skin: warm and dry, no rash or erythema  Head: normocephalic and atraumatic  Eyes: pupils equal, round, and reactive to light, extraocular eye movements intact, conjunctivae normal  ENT: tympanic membrane, external ear and ear canal normal bilaterally, nose without deformity, nasal mucosa and turbinates normal without polyps  Neck: supple and non-tender without mass, no thyromegaly or thyroid nodules, no cervical lymphadenopathy  Pulmonary/Chest: clear to auscultation bilaterally- no wheezes, rales or rhonchi, normal air movement, no respiratory distress  Cardiovascular: normal rate, regular rhythm, normal S1 and S2, no murmurs, rubs, clicks, or gallops, distal pulses intact  Abdomen: soft, non-tender, non-distended, normal bowel sounds, no masses or organomegaly  Extremities: no cyanosis, clubbing or edema  Musculoskeletal: normal range of motion, no joint swelling, deformity or tenderness  Neurologic: reflexes normal and symmetric, no cranial nerve deficit, gait, coordination and speech normal       Allergies   Allergen Reactions    Penicillins Rash     ITCHING     Prior to Visit Medications    Medication Sig Taking?  Authorizing Provider   Multiple Vitamins-Minerals (THERAPEUTIC MULTIVITAMIN-MINERALS) tablet Take 1 tablet by mouth daily Yes Historical Provider, MD   meloxicam (MOBIC) 15 MG tablet TAKE 1 TABLET BY MOUTH EVERY DAY Yes Nam Law PA-C   Red Yeast Rice Extract (RED YEAST RICE PO) Take by mouth Yes Historical Provider, MD   vitamin D (CHOLECALCIFEROL) 400 UNITS TABS tablet Take 1,000 Units by mouth daily  Yes Historical Provider, MD   diclofenac (VOLTAREN) 50 MG EC tablet Take 1 tablet by mouth 2 times daily (with meals)  Stacie Zavala MD       [facility] (Including outside providers/suppliers regularly involved in providing care):   Patient Care Team:  Merlinda Rily, APRN - CNP as PCP - General (Family Medicine)  Merlinda Rily, APRN - CNP as PCP - [facility] Empaneled Provider  Janet Tran DO as Consulting Physician (Orthopedic Surgery)  Ryhs Ramos RN as Care Transitions Nurse     Reviewed and updated this visit:  Tobacco  Allergies  Meds  Problems  Med Hx  Surg Hx  Soc Hx  Fam Hx

## 2022-11-03 NOTE — PATIENT INSTRUCTIONS
Personalized Preventive Plan for Lali Bland - 11/3/2022  Medicare offers a range of preventive health benefits. Some of the tests and screenings are paid in full while other may be subject to a deductible, co-insurance, and/or copay. Some of these benefits include a comprehensive review of your medical history including lifestyle, illnesses that may run in your family, and various assessments and screenings as appropriate. After reviewing your medical record and screening and assessments performed today your provider may have ordered immunizations, labs, imaging, and/or referrals for you. A list of these orders (if applicable) as well as your Preventive Care list are included within your After Visit Summary for your review. Other Preventive Recommendations:    A preventive eye exam performed by an eye specialist is recommended every 1-2 years to screen for glaucoma; cataracts, macular degeneration, and other eye disorders. A preventive dental visit is recommended every 6 months. Try to get at least 150 minutes of exercise per week or 10,000 steps per day on a pedometer . Order or download the FREE \"Exercise & Physical Activity: Your Everyday Guide\" from [employer]. Call 7-723.426.1186 or search [employer] online. You need 0960-2151 mg of calcium and 8081-9255 IU of vitamin D per day. It is possible to meet your calcium requirement with diet alone, but a vitamin D supplement is usually necessary to meet this goal.  When exposed to the sun, use a sunscreen that protects against both UVA and UVB radiation with an SPF of 30 or greater. Reapply every 2 to 3 hours or after sweating, drying off with a towel, or swimming. Always wear a seat belt when traveling in a car. Always wear a helmet when riding a bicycle or motorcycle.

## 2022-11-03 NOTE — FLOWSHEET NOTE
[facility], [address]  Phone: [phone]  Fax [phone]  :  Physical Therapy Treatment Note/ Progress Report:           Date:  11/3/2022    Patient Name:  Jose Renee    :  1955  MRN: [id number]      Referring Physician: Shara Milton MD                                                     Evaluation Date: 2022                                           Date of Referral:22     Insurance: Medicare     Next MD appt: scheduled:9/15/22     Medical Diagnosis:  D31.110 (ICD-10-CM) - S/P total knee arthroplasty, left        Treatment Diagnosis:  M25.662 L knee stiffness; M25.462 L knee effusion;  M25.562 L knee pain; difficulty walking R26.2  S/P L knee TKA 22           Precautions/ Contra-indications:         Has the plan of care been signed (Y/N):        [x]  Yes  []  No    Progress report will be due (10 Rx or 30 days ):   Visit 10 or 22 DONE visit 5 22  DUE visit 15 10/13/22 DONE visit 13 10/20/22  DUE visit 23 15/04/88      Recertification will be due (POC Duration  / 90 days ): 22     Is this a Progress Report:     []  Yes  [x]  No      If Yes:  Date Range for reporting period:  Beginning:10/20/22  Ending:               Visit # Date Range Insurance Allowable Requires auth   IN PERSON 16 22- BMN    [x]no        []yes:   TELEHEALTH       TOTAL              CX/NS       Next PT appt: 22                PT Practice Pattern:H  Co morbidities:1-2  Surgical:L TKA  DOS 22  Nonsurgical  INITIAL VISIT 22 CURRENT VISIT 10/4/22   PAIN 0-10/10 4-8/10 4-5/10   FUNCTIONAL SCALE FOTO 18/100 44/100   ROM KE -5 0    KF 45 110 after manuals                           STRENGHT (MMT) Quad tone trace Fair    KE  4+/5    KF  4+/5    HF  4+/5                         Latex Allergy/Pacemaker/Adhesive allergy:  [x]NO      []YES     RESTRICTIONS/PRECAUTIONS: protocol         SUBJECTIVE:  Pt reports her knee feels really good. Pain level:  3/10    OBJECTIVE:   Observation: no edema  Good patellar mobility  Good scar tissue mobility  Good EXT mobility            Exercises/Interventions:     HEP:  [employer]  Access Code: [email or web address]  URL: [email or web address]  Date: 08/31/2022  Prepared by: Yolis Pichardo      Therapeutic Ex (93678)  NMR re-education (39216) Reps/Sets/time Notes/CUES/Assessment HEP   See previous flowsheets for HEP                                                Cues for KF and not rotating pelvis    NV    NV    NV            VC    VC                                      1#                  Recumbent full rev for ROM 6'  2 position     Incline G stretch BLEs 4x20\"                         LP BLE 60#  2x10 w ball ADD  2x10 w pilates ring                                     Standing rows tandem LLE and RLE  posterior BTB 15x3\" ea       Marching w TB EXT BUE BTB 15x     Pt education x6' Expected progress over the next 3-6 months          Manual Intervention (59165)                  Prone KF PROM 5'     Manual HS/ITB and hip stretch 2'     Patellar  mobs GII, KE mobs GII 2'                       Therapeutic Activity (55428)                                                Therapeutic Exercise and NMR EXR  [x] (72546) Provided verbal/tactile cueing for activities related to strengthening, flexibility, endurance, ROM for improvements in LE, proximal hip, and core control with self care, mobility, lifting, ambulation. [x] (66011) Provided verbal/tactile cueing for activities related to improving balance, coordination, kinesthetic sense, posture, motor skill, proprioception  to assist with LE, proximal hip, and core control in self care, mobility, lifting, ambulation and eccentric single leg control.      NMR and Therapeutic Activities:    [] (54860 or 58855) Provided verbal/tactile cueing for activities related to improving balance, coordination, kinesthetic sense, posture, motor skill, proprioception and motor activation to allow for proper function of core, proximal hip and LE with self care and ADLs  [] (31812) Gait Re-education- Provided training and instruction to the patient for proper LE, core and proximal hip recruitment and positioning and eccentric body weight control with ambulation re-education including up and down stairs     Home Exercise Program:    [x] (97154) Reviewed/Progressed HEP activities related to strengthening, flexibility, endurance, ROM of core, proximal hip and LE for functional self-care, mobility, lifting and ambulation/stair navigation   [] (04351)Reviewed/Progressed HEP activities related to improving balance, coordination, kinesthetic sense, posture, motor skill, proprioception of core, proximal hip and LE for self care, mobility, lifting, and ambulation/stair navigation      Manual Treatments:  PROM / STM / Oscillations-Mobs:  G-I, II, III, IV (PA's, Inf., Post.)  [x] (57109) Provided manual therapy to mobilize LE, proximal hip and/or LS spine soft tissue/joints for the purpose of modulating pain, promoting relaxation,  increasing ROM, reducing/eliminating soft tissue swelling/inflammation/restriction, improving soft tissue extensibility and allowing for proper ROM for normal function with self care, mobility, lifting and ambulation. Trigger point Dry Needling:  fine needle insertion into myofascial trigger points to stimulate a healing response  [] (33365) Needle insertion without injection: 1 or 2 muscles  [] (48489) Needle insertion without injection: 3 or more muscles    Modalities:  declined     [] GAME READY (VASO)- for significant edema, swelling, pain control.   x10'   [] ESU (HV/PM) for edema and pain control      Charges:  Timed Code Treatment Minutes: 45   Total Treatment Minutes: 45     Medicare total (add KX at $2000)  [facility] certify that this patient meets one of the below criteria necessary for becoming an exception to the Medicare cap on therapy services:    [x]  The patient has a condition identified by an ICD-10 code that has a direct and significant impact on the need for therapy. (Significantly impacts the rate of recovery.)                   []  The patient has a complexity identified by an ICD-10 code that has a direct and significant impact on the need for therapy. (Significantly impacts the rate of recovery and is associated with a primary condition.)         []  The patient has associated variables that influence the amount of treatment to include:  Social support, self-efficacy/motivation, prognosis, time since onset/acuity. []  The patient has generalized musculoskeletal conditions or a condition affecting multiple sites that will have a direct impact on the rate of recovery. []  The patient had a prior episode of outpatient therapy during this calendar year for a different condition. []  The patient has a mental or cognitive disorder in addition to the condition being treated that will have a direct and significant impact on the rate of recovery. [] EVAL (LOW) 78601 (typically 20 minutes face-to-face)  [] EVAL (MOD) 21884 (typically 30 minutes face-to-face)  [] EVAL (HIGH) 31318 (typically 45 minutes face-to-face)  [] RE-EVAL     [x] IL(72451) x  1  [] IONTO  [x] NMR (30308) x   1  [] VASO  [x] Manual (20277) x  1  [] Other:  [] TA x      [] Mech Traction (09393)  [] ES(attended) (69507)     [] ES (un) (51095):             GOALS:      Patient stated goal: to be able to walk   [x] Progressing: [] Met: [] Not Met: [] Adjusted     Therapist goals for Patient:     Short Term Goals: To be achieved in: 2 weeks 2/2 MET    1. Independent in HEP and progression per patient tolerance, in order to prevent re-injury. [] Progressing: [x] Met: [] Not Met: [] Adjusted  2.  Patient will have a decrease in pain to facilitate improvement in movement, function, and ADLs as indicated by Functional Deficits. [] Progressing: [x] Met: [] Not Met: [] Adjusted     Long Term Goals: To be achieved in: 12 weeks        1. FOTO score to be equal or greater to the predicted score (57/100)  to assist with reaching prior level of function. [x] Progressing: [] Met: [] Not Met: [] Adjusted      2. Patient will demonstrate increased AROM to 0-120 to allow for getting in and out of car as indicated by patients Functional Deficits. [x] Progressing: [] Met: [] Not Met: [] Adjusted       3. Patient will demonstrate an increase in strength to 4+/5 to allow for sit to stand as indicated by patients Functional Deficits. [x] Progressing: [] Met: [] Not Met: [] Adjusted       4. Patient will return to all transfers, work activities, and functional activities without increased symptoms or restriction, specifically deep water aerobics. [x] Progressing: [] Met: [] Not Met: [] Adjusted       5. (Pt specific functional or activity goal)Pt will be able to walk for 20 minutes w/o walker and w/o increased s/s for grocery shopping. [x] Progressing: [] Met: [] Not Met: [] Adjusted  6. Pt will demonstrate pain decreased by 50%  (0-4/10) with all ADLS. [x] Progressing: [] Met: [] Not Met: [] Adjusted        ASSESSMENT:   Pt tolerated session well. Patient will benefit from continued skilled intervention to increase ROM, flexibility, strength and function. Overall Progression Towards Functional goals/ Treatment Progress Update:  [x] Patient is progressing as expected towards functional goals listed. [] Progression is slowed due to complexities/Impairments listed. [] Progression has been slowed due to co-morbidities.   [] Plan just implemented, too soon to assess goals progression <30days   [] Goals require adjustment due to lack of progress  [] Patient is not progressing as expected and requires additional follow up with physician  [] Other    Prognosis for POC: [x] Good [] Fair  [] Poor      Patient requires continued skilled intervention: [x] Yes  [] No    Treatment/Activity Tolerance:  [x] Patient able to complete treatment  [] Patient limited by fatigue  [x] Patient limited by pain    [] Patient limited by other medical complications  [] Other:             PLAN: Cont. PT 2x/week. Focus on ROM and strength, balance as well as gait and stairs. [x] Continue per plan of care [] Alter current plan (see comments above)  [] Plan of care initiated [] Hold pending MD visit [] Discharge      Electronically signed by:  Jeanine Tong, PT, [id number]          Note: If patient does not return for scheduled/ recommended follow up visits, this note will serve as a discharge from care along with most recent update on progress.

## 2022-11-07 ENCOUNTER — HOSPITAL ENCOUNTER (OUTPATIENT)
Dept: PHYSICAL THERAPY | Age: 67
Setting detail: THERAPIES SERIES
Discharge: HOME | End: 2022-11-07
Payer: MEDICARE

## 2022-11-07 DIAGNOSIS — E78.00 PURE HYPERCHOLESTEROLEMIA: ICD-10-CM

## 2022-11-07 LAB
CHOLESTEROL, TOTAL: 268 MG/DL (ref 0–199)
HDLC SERPL-MCNC: 67 MG/DL (ref 40–60)
LDL CHOLESTEROL CALCULATED: 175 MG/DL
TRIGL SERPL-MCNC: 130 MG/DL (ref 0–150)
VLDLC SERPL CALC-MCNC: 26 MG/DL

## 2022-11-07 PROCEDURE — 97112 NEUROMUSCULAR REEDUCATION: CPT | Performed by: PHYSICAL THERAPIST

## 2022-11-07 PROCEDURE — 97110 THERAPEUTIC EXERCISES: CPT | Performed by: PHYSICAL THERAPIST

## 2022-11-07 NOTE — FLOWSHEET NOTE
[facility], [address] [place]  Phone: [phone]  Fax [phone]  :  Physical Therapy Treatment Note/ Progress Report:           Date:  2022    Patient Name:  Phong Goldman    :  1955  MRN: [id number]      Referring Physician: Marianna Carrillo MD                                                     Evaluation Date: 2022                                           Date of Referral:22     Insurance: Medicare     Next MD appt: scheduled:9/15/22     Medical Diagnosis:  C36.201 (ICD-10-CM) - S/P total knee arthroplasty, left        Treatment Diagnosis:  M25.662 L knee stiffness; M25.462 L knee effusion;  M25.562 L knee pain; difficulty walking R26.2  S/P L knee TKA 22           Precautions/ Contra-indications:         Has the plan of care been signed (Y/N):        [x]  Yes  []  No    Progress report will be due (10 Rx or 30 days ):   Visit 10 or 22 DONE visit 5 22  DUE visit 15 10/13/22 DONE visit 13 10/20/22  DUE visit 23 18      Recertification will be due (POC Duration  / 90 days ): 22     Is this a Progress Report:     []  Yes  [x]  No      If Yes:  Date Range for reporting period:  Beginning:10/20/22  Ending:               Visit # Date Range Insurance Allowable Requires auth   IN PERSON 17 22- BMN    [x]no        []yes:   TELEHEALTH       TOTAL              CX/NS       Next PT appt: 22                PT Practice Pattern:H  Co morbidities:1-2  Surgical:L TKA  DOS 22  Nonsurgical  INITIAL VISIT 22 CURRENT VISIT 10/4/22   PAIN 0-10/10 4-8/10 4-5/10   FUNCTIONAL SCALE FOTO 18/100 44/100   ROM KE -5 0    KF 45 110 after manuals                           STRENGHT (MMT) Quad tone trace Fair    KE  4+/5    KF  4+/5    HF  4+/5                         Latex Allergy/Pacemaker/Adhesive allergy:  [x]NO      []YES     RESTRICTIONS/PRECAUTIONS: protocol         SUBJECTIVE:  Pt reports her knee feels really good. She was able to mop her house and such over the weekend. Pain level:  0-1/10    OBJECTIVE:   Observation: no edema  Good patellar mobility  Good scar tissue mobility  Good EXT mobility            Exercises/Interventions:     HEP:  Francisca  Access Code: [email or web address]  URL: [email or web address]  Date: 08/31/2022  Prepared by: Pascual Workman      Therapeutic Ex (98459)  NMR re-education (40928) Reps/Sets/time Notes/CUES/Assessment HEP   See previous flowsheets for HEP                                                Cues for KF and not rotating pelvis    NV    NV    NV            VC    VC                                      1#                      Incline G stretch BLEs 4x20\"     Reformer  Gently warm up  KF stretch squats  marching    [id number]  3'  2'  2'    3                                                                Stair training up with L down w left, one rail, two rails,  8'     Air ex  BHR  LLE FSU and back  Marching  Lateral up and over       10x  15x  15x  15x       Hip hike LSU LLE 4\" 12x     LSU LLE 4\" 12x                             Pt education x6' Expected progress over the next 3-6 months          Manual Intervention (59973)                                                Therapeutic Activity (25524)                                                Therapeutic Exercise and NMR EXR  [x] (30350) Provided verbal/tactile cueing for activities related to strengthening, flexibility, endurance, ROM for improvements in LE, proximal hip, and core control with self care, mobility, lifting, ambulation. [x] (75633) Provided verbal/tactile cueing for activities related to improving balance, coordination, kinesthetic sense, posture, motor skill, proprioception  to assist with LE, proximal hip, and core control in self care, mobility, lifting, ambulation and eccentric single leg control.      NMR and Therapeutic Activities:    [x] (53830 or 32809) Provided verbal/tactile cueing for activities related to improving balance, coordination, kinesthetic sense, posture, motor skill, proprioception and motor activation to allow for proper function of core, proximal hip and LE with self care and ADLs  [] (30400) Gait Re-education- Provided training and instruction to the patient for proper LE, core and proximal hip recruitment and positioning and eccentric body weight control with ambulation re-education including up and down stairs     Home Exercise Program:    [x] (23063) Reviewed/Progressed HEP activities related to strengthening, flexibility, endurance, ROM of core, proximal hip and LE for functional self-care, mobility, lifting and ambulation/stair navigation   [] (07171)Reviewed/Progressed HEP activities related to improving balance, coordination, kinesthetic sense, posture, motor skill, proprioception of core, proximal hip and LE for self care, mobility, lifting, and ambulation/stair navigation      Manual Treatments:  PROM / STM / Oscillations-Mobs:  G-I, II, III, IV (PA's, Inf., Post.)  [] (34152) Provided manual therapy to mobilize LE, proximal hip and/or LS spine soft tissue/joints for the purpose of modulating pain, promoting relaxation,  increasing ROM, reducing/eliminating soft tissue swelling/inflammation/restriction, improving soft tissue extensibility and allowing for proper ROM for normal function with self care, mobility, lifting and ambulation. Trigger point Dry Needling:  fine needle insertion into myofascial trigger points to stimulate a healing response  [] (55397) Needle insertion without injection: 1 or 2 muscles  [] (94136) Needle insertion without injection: 3 or more muscles    Modalities:  declined     [] GAME READY (VASO)- for significant edema, swelling, pain control.   x10'   [] ESU (HV/PM) for edema and pain control      Charges:  Timed Code Treatment Minutes: 45   Total Treatment Minutes: 45     Medicare total (add KX at $2000) [address] [place] [address] certify that this patient meets one of the below criteria necessary for becoming an exception to the Medicare cap on therapy services:    [x]  The patient has a condition identified by an ICD-10 code that has a direct and significant impact on the need for therapy. (Significantly impacts the rate of recovery.)                   []  The patient has a complexity identified by an ICD-10 code that has a direct and significant impact on the need for therapy. (Significantly impacts the rate of recovery and is associated with a primary condition.)         []  The patient has associated variables that influence the amount of treatment to include:  Social support, self-efficacy/motivation, prognosis, time since onset/acuity. []  The patient has generalized musculoskeletal conditions or a condition affecting multiple sites that will have a direct impact on the rate of recovery. []  The patient had a prior episode of outpatient therapy during this calendar year for a different condition. []  The patient has a mental or cognitive disorder in addition to the condition being treated that will have a direct and significant impact on the rate of recovery. [] EVAL (LOW) 38024 (typically 20 minutes face-to-face)  [] EVAL (MOD) 33324 (typically 30 minutes face-to-face)  [] EVAL (HIGH) 53290 (typically 45 minutes face-to-face)  [] RE-EVAL     [x] OU(39918) x  1  [] IONTO  [x] NMR (57503) x   2  [] VASO  [] Manual (48163) x    [] Other:  [] TA x      [] Mech Traction (66982)  [] ES(attended) (75857)     [] ES (un) (13644):             GOALS:      Patient stated goal: to be able to walk   [x] Progressing: [] Met: [] Not Met: [] Adjusted     Therapist goals for Patient:     Short Term Goals: To be achieved in: 2 weeks 2/2 MET    1. Independent in HEP and progression per patient tolerance, in order to prevent re-injury.    [] Progressing: [x] Met: [] Not Met: [] Adjusted  2. Patient will have a decrease in pain to facilitate improvement in movement, function, and ADLs as indicated by Functional Deficits. [] Progressing: [x] Met: [] Not Met: [] Adjusted     Long Term Goals: To be achieved in: 12 weeks        1. FOTO score to be equal or greater to the predicted score (57/100)  to assist with reaching prior level of function. [x] Progressing: [] Met: [] Not Met: [] Adjusted      2. Patient will demonstrate increased AROM to 0-120 to allow for getting in and out of car as indicated by patients Functional Deficits. [x] Progressing: [] Met: [] Not Met: [] Adjusted       3. Patient will demonstrate an increase in strength to 4+/5 to allow for sit to stand as indicated by patients Functional Deficits. [x] Progressing: [] Met: [] Not Met: [] Adjusted       4. Patient will return to all transfers, work activities, and functional activities without increased symptoms or restriction, specifically deep water aerobics. [x] Progressing: [] Met: [] Not Met: [] Adjusted       5. (Pt specific functional or activity goal)Pt will be able to walk for 20 minutes w/o walker and w/o increased s/s for grocery shopping. [x] Progressing: [] Met: [] Not Met: [] Adjusted  6. Pt will demonstrate pain decreased by 50%  (0-4/10) with all ADLS. [x] Progressing: [] Met: [] Not Met: [] Adjusted        ASSESSMENT:   Pt tolerated session well. Patient will benefit from continued skilled intervention to increase ROM, flexibility, strength and function. Overall Progression Towards Functional goals/ Treatment Progress Update:  [x] Patient is progressing as expected towards functional goals listed. [] Progression is slowed due to complexities/Impairments listed. [] Progression has been slowed due to co-morbidities.   [] Plan just implemented, too soon to assess goals progression <30days   [] Goals require adjustment due to lack of progress  [] Patient is not progressing as expected and requires additional follow up with physician  [] Other    Prognosis for POC: [x] Good [] Fair  [] Poor      Patient requires continued skilled intervention: [x] Yes  [] No    Treatment/Activity Tolerance:  [x] Patient able to complete treatment  [] Patient limited by fatigue  [x] Patient limited by pain    [] Patient limited by other medical complications  [] Other:             PLAN: Cont. PT 2x/week. Focus on ROM and strength, balance as well as gait and stairs. [x] Continue per plan of care [] Alter current plan (see comments above)  [] Plan of care initiated [] Hold pending MD visit [] Discharge      Electronically signed by:  Shahrzad Faustin, PT, [id number]          Note: If patient does not return for scheduled/ recommended follow up visits, this note will serve as a discharge from care along with most recent update on progress.

## 2022-11-10 ENCOUNTER — HOSPITAL ENCOUNTER (OUTPATIENT)
Dept: PHYSICAL THERAPY | Age: 67
Setting detail: THERAPIES SERIES
Discharge: HOME | End: 2022-11-10
Payer: MEDICARE

## 2022-11-10 PROCEDURE — 97112 NEUROMUSCULAR REEDUCATION: CPT | Performed by: PHYSICAL THERAPIST

## 2022-11-10 PROCEDURE — 97110 THERAPEUTIC EXERCISES: CPT | Performed by: PHYSICAL THERAPIST

## 2022-11-10 NOTE — FLOWSHEET NOTE
[facility], [address] [place]  Phone: [phone]  Fax [phone]  :  Physical Therapy Treatment Note/ Progress Report:           Date:  11/10/2022    Patient Name:  Beto Vargas    :  1955  MRN: [id number]      Referring Physician: Tiffanie Patiño MD                                                     Evaluation Date: 2022                                           Date of Referral:22     Insurance: Medicare     Next MD appt: scheduled:9/15/22     Medical Diagnosis:  K04.853 (ICD-10-CM) - S/P total knee arthroplasty, left        Treatment Diagnosis:  M25.662 L knee stiffness; M25.462 L knee effusion;  M25.562 L knee pain; difficulty walking R26.2  S/P L knee TKA 22           Precautions/ Contra-indications:         Has the plan of care been signed (Y/N):        [x]  Yes  []  No    Progress report will be due (10 Rx or 30 days ):   Visit 10 or 22 DONE visit 5 22  DUE visit 15 10/13/22 DONE visit 13 10/20/22  DUE visit 23 /32      Recertification will be due (POC Duration  / 90 days ): 22     Is this a Progress Report:     []  Yes  [x]  No      If Yes:  Date Range for reporting period:  Beginning:10/20/22  Ending:               Visit # Date Range Insurance Allowable Requires auth   IN PERSON 17 22- BMN    [x]no        []yes:   TELEHEALTH       TOTAL              CX/NS       Next PT appt: 22                PT Practice Pattern:H  Co morbidities:1-2  Surgical:L TKA  DOS 22  Nonsurgical  INITIAL VISIT 22 CURRENT VISIT 10/4/22   PAIN 0-10/10 4-8/10 4-5/10   FUNCTIONAL SCALE FOTO 18/100 44/100   ROM KE -5 0    KF 45 110 after manuals                           STRENGHT (MMT) Quad tone trace Fair    KE  4+/5    KF  4+/5    HF  4+/5                         Latex Allergy/Pacemaker/Adhesive allergy:  [x]NO      []YES     RESTRICTIONS/PRECAUTIONS: protocol         SUBJECTIVE:  Pt reports her knee feels really good. She was able to mop her house and such over the weekend. Pain level:  0-1/10    OBJECTIVE:   Observation: no edema  Good patellar mobility  Good scar tissue mobility  Good EXT mobility            Exercises/Interventions:     HEP:  rFancisca  Access Code: [email or web address]  URL: [email or web address]  Date: 08/31/2022  Prepared by: Hubert Carty      Therapeutic Ex (93857)  NMR re-education (06383) Reps/Sets/time Notes/CUES/Assessment HEP   See previous flowsheets for HEP                                                Cues for KF and not rotating pelvis    NV    NV    NV            VC    VC                                      1#                  Recumbent full rev for ROM 6'  2 position     Incline G stretch BLEs 4x20\"                         LP BLE 60#  2x10 w ball ADD  2x10 w pilates ring     KF BLE 20#  2x15     LAQ EOB LLE 2x15  2#       Lateral walk 25ft x2     Forward high knee march 25ft x2     Backward walk 25ft x2                                 Hip hike LSU LLE 4\" 12x     LSU LLE 4\" 12x     Air ex  Rhomberg w BUE ABD, BUE alt punches  SLS LLE   15x  15x  5x15\"                         Pt education x6' Expected progress over the next 3-6 months          Manual Intervention (15543)                                                Therapeutic Activity (63462)                                                Therapeutic Exercise and NMR EXR  [x] (91969) Provided verbal/tactile cueing for activities related to strengthening, flexibility, endurance, ROM for improvements in LE, proximal hip, and core control with self care, mobility, lifting, ambulation. [x] (94818) Provided verbal/tactile cueing for activities related to improving balance, coordination, kinesthetic sense, posture, motor skill, proprioception  to assist with LE, proximal hip, and core control in self care, mobility, lifting, ambulation and eccentric single leg control.      NMR and Therapeutic Activities:    [x] (69336 or 83355) Provided verbal/tactile cueing for activities related to improving balance, coordination, kinesthetic sense, posture, motor skill, proprioception and motor activation to allow for proper function of core, proximal hip and LE with self care and ADLs  [] (71557) Gait Re-education- Provided training and instruction to the patient for proper LE, core and proximal hip recruitment and positioning and eccentric body weight control with ambulation re-education including up and down stairs     Home Exercise Program:    [x] (27631) Reviewed/Progressed HEP activities related to strengthening, flexibility, endurance, ROM of core, proximal hip and LE for functional self-care, mobility, lifting and ambulation/stair navigation   [] (81069)Reviewed/Progressed HEP activities related to improving balance, coordination, kinesthetic sense, posture, motor skill, proprioception of core, proximal hip and LE for self care, mobility, lifting, and ambulation/stair navigation      Manual Treatments:  PROM / STM / Oscillations-Mobs:  G-I, II, III, IV (PA's, Inf., Post.)  [] (61960) Provided manual therapy to mobilize LE, proximal hip and/or LS spine soft tissue/joints for the purpose of modulating pain, promoting relaxation,  increasing ROM, reducing/eliminating soft tissue swelling/inflammation/restriction, improving soft tissue extensibility and allowing for proper ROM for normal function with self care, mobility, lifting and ambulation. Trigger point Dry Needling:  fine needle insertion into myofascial trigger points to stimulate a healing response  [] (64464) Needle insertion without injection: 1 or 2 muscles  [] (33916) Needle insertion without injection: 3 or more muscles    Modalities:  declined     [] GAME READY (VASO)- for significant edema, swelling, pain control.   x10'   [] ESU (HV/PM) for edema and pain control      Charges:  Timed Code Treatment Minutes: 45   Total Treatment prevent re-injury. [] Progressing: [x] Met: [] Not Met: [] Adjusted  2. Patient will have a decrease in pain to facilitate improvement in movement, function, and ADLs as indicated by Functional Deficits. [] Progressing: [x] Met: [] Not Met: [] Adjusted     Long Term Goals: To be achieved in: 12 weeks        1. FOTO score to be equal or greater to the predicted score (57/100)  to assist with reaching prior level of function. [x] Progressing: [] Met: [] Not Met: [] Adjusted      2. Patient will demonstrate increased AROM to 0-120 to allow for getting in and out of car as indicated by patients Functional Deficits. [x] Progressing: [] Met: [] Not Met: [] Adjusted       3. Patient will demonstrate an increase in strength to 4+/5 to allow for sit to stand as indicated by patients Functional Deficits. [x] Progressing: [] Met: [] Not Met: [] Adjusted       4. Patient will return to all transfers, work activities, and functional activities without increased symptoms or restriction, specifically deep water aerobics. [x] Progressing: [] Met: [] Not Met: [] Adjusted       5. (Pt specific functional or activity goal)Pt will be able to walk for 20 minutes w/o walker and w/o increased s/s for grocery shopping. [x] Progressing: [] Met: [] Not Met: [] Adjusted  6. Pt will demonstrate pain decreased by 50%  (0-4/10) with all ADLS. [x] Progressing: [] Met: [] Not Met: [] Adjusted        ASSESSMENT:   Pt tolerated session well. Pt seemed somewhat lethargic this visit. Patient will benefit from continued skilled intervention to increase ROM, flexibility, strength and function. Overall Progression Towards Functional goals/ Treatment Progress Update:  [x] Patient is progressing as expected towards functional goals listed. [] Progression is slowed due to complexities/Impairments listed. [] Progression has been slowed due to co-morbidities.   [] Plan just implemented, too soon to assess goals progression <30days   [] Goals require adjustment due to lack of progress  [] Patient is not progressing as expected and requires additional follow up with physician  [] Other    Prognosis for POC: [x] Good [] Fair  [] Poor      Patient requires continued skilled intervention: [x] Yes  [] No    Treatment/Activity Tolerance:  [x] Patient able to complete treatment  [] Patient limited by fatigue  [x] Patient limited by pain    [] Patient limited by other medical complications  [] Other:             PLAN: Cont. PT 2x/week. Focus on ROM and strength, balance as well as gait and stairs. [x] Continue per plan of care [] Alter current plan (see comments above)  [] Plan of care initiated [] Hold pending MD visit [] Discharge      Electronically signed by:  Narciso Roland, PT, [id number]          Note: If patient does not return for scheduled/ recommended follow up visits, this note will serve as a discharge from care along with most recent update on progress.

## 2022-11-14 ENCOUNTER — HOSPITAL ENCOUNTER (OUTPATIENT)
Dept: PHYSICAL THERAPY | Age: 67
Setting detail: THERAPIES SERIES
Discharge: HOME | End: 2022-11-14
Payer: MEDICARE

## 2022-11-14 ENCOUNTER — CARE COORDINATION (OUTPATIENT)
Dept: CASE MANAGEMENT | Age: 67
End: 2022-11-14

## 2022-11-14 PROCEDURE — 97110 THERAPEUTIC EXERCISES: CPT | Performed by: PHYSICAL THERAPIST

## 2022-11-14 PROCEDURE — 97112 NEUROMUSCULAR REEDUCATION: CPT | Performed by: PHYSICAL THERAPIST

## 2022-11-14 PROCEDURE — 97140 MANUAL THERAPY 1/> REGIONS: CPT | Performed by: PHYSICAL THERAPIST

## 2022-11-14 NOTE — CARE COORDINATION
Spoke with patient. Incision status: States free from redness or drainage. Edema/Swelling: States swelling improving, still stiff at times. Pain level and status: States pain is tolerable. Bowels: No complaints. Outpatient therapy: Continues once a week for another two weeks. Do you have all of your medications: Yes    Changes in medications: No    Instructed patient that bundle program and follow up phone calls are ending. Instructed patient to follow up with  for any complications/concerns.     Follow up appointments:    Future Appointments   Date Time Provider Cris Oates   11/21/2022 10:15 AM Manoj Sotomayor, PT [id number] [place] [address] AND PT Adiel POOLE   11/21/2022  2:00 PM [facility] EG BROOKE Harvey   11/28/2022 10:15 AM Manoj Sotomayor, PT [facility] AND PT Lin Hines   12/8/2022  9:15 AM Jerry Mohan MD AND ORTHO [facility] Manning BSN  Care Transition Nurse for ortho bundle  [phone]

## 2022-11-14 NOTE — FLOWSHEET NOTE
[facility], [address] [place]  Phone: [phone]  Fax [phone]  :  Physical Therapy Treatment Note/ Progress Report:           Date:  2022    Patient Name:  Sharyn Brown    :  1955  MRN: [id number]      Referring Physician: Jose De Jesus Fortune MD                                                     Evaluation Date: 2022                                           Date of Referral:22     Insurance: Medicare     Next MD appt: scheduled:9/15/22     Medical Diagnosis:  M79.555 (ICD-10-CM) - S/P total knee arthroplasty, left        Treatment Diagnosis:  M25.662 L knee stiffness; M25.462 L knee effusion;  M25.562 L knee pain; difficulty walking R26.2  S/P L knee TKA 22           Precautions/ Contra-indications:         Has the plan of care been signed (Y/N):        [x]  Yes  []  No    Progress report will be due (10 Rx or 30 days ):   Visit 10 or 22 DONE visit 5 22  DUE visit 15 10/13/22 DONE visit 13 10/20/22  DUE visit 23       Recertification will be due (POC Duration  / 90 days ): 22     Is this a Progress Report:     []  Yes  [x]  No      If Yes:  Date Range for reporting period:  Beginning:10/20/22  Ending:               Visit # Date Range Insurance Allowable Requires auth   IN PERSON 18 22- BMN    [x]no        []yes:   TELEHEALTH       TOTAL              CX/NS       Next PT appt:                 PT Practice Pattern:H  Co morbidities:1-2  Surgical:L TKA  DOS 22  Nonsurgical  INITIAL VISIT 22 CURRENT VISIT 10/4/22   PAIN 0-10/10 4-8/10 4-5/10   FUNCTIONAL SCALE FOTO 18/100 44/100   ROM KE -5 0    KF 45 110 after manuals                           STRENGHT (MMT) Quad tone trace Fair    KE  4+/5    KF  4+/5    HF  4+/5                         Latex Allergy/Pacemaker/Adhesive allergy:  [x]NO      []YES     RESTRICTIONS/PRECAUTIONS: protocol         SUBJECTIVE: Pt reports her knee feels really good she is just still tight. Pain level:  0-1/10    OBJECTIVE:   Observation: no edema  Good patellar mobility  Good scar tissue mobility  Good EXT mobility            Exercises/Interventions:     HEP:  Francisca  Access Code: [email or web address]  URL: [email or web address]  Date: 08/31/2022  Prepared by: Jesus Mac      Therapeutic Ex (79949)  NMR re-education (54904) Reps/Sets/time Notes/CUES/Assessment HEP   See previous flowsheets for HEP                                                Cues for KF and not rotating pelvis    NV    NV    NV            VC    VC                                      1#                  Recumbent full rev for ROM 6'  2 position     Incline G stretch BLEs 4x20\"     Reformer  Gently warm up  KF stretch squats  marching    1R1B  3'  2'  2'                                                                                                          Pt education x6' Expected progress over the next 3-6 months          Manual Intervention ([id number])      IASTM w HG multi tool to ant knee, pes, ADD, RF, VMO and VL  GII post tob and rotation mobs for FLX  PROM FLX  Manual HS stretch x15                                                     Therapeutic Activity (16338)                                                Therapeutic Exercise and NMR EXR  [x] (40477) Provided verbal/tactile cueing for activities related to strengthening, flexibility, endurance, ROM for improvements in LE, proximal hip, and core control with self care, mobility, lifting, ambulation. [x] (27877) Provided verbal/tactile cueing for activities related to improving balance, coordination, kinesthetic sense, posture, motor skill, proprioception  to assist with LE, proximal hip, and core control in self care, mobility, lifting, ambulation and eccentric single leg control.      NMR and Therapeutic Activities:    [x] (95319 or 37058) Provided verbal/tactile cueing for activities related to improving balance, coordination, kinesthetic sense, posture, motor skill, proprioception and motor activation to allow for proper function of core, proximal hip and LE with self care and ADLs  [] (29380) Gait Re-education- Provided training and instruction to the patient for proper LE, core and proximal hip recruitment and positioning and eccentric body weight control with ambulation re-education including up and down stairs     Home Exercise Program:    [x] (25851) Reviewed/Progressed HEP activities related to strengthening, flexibility, endurance, ROM of core, proximal hip and LE for functional self-care, mobility, lifting and ambulation/stair navigation   [] (82680)Reviewed/Progressed HEP activities related to improving balance, coordination, kinesthetic sense, posture, motor skill, proprioception of core, proximal hip and LE for self care, mobility, lifting, and ambulation/stair navigation      Manual Treatments:  PROM / STM / Oscillations-Mobs:  G-I, II, III, IV (PA's, Inf., Post.)  [x] (02977) Provided manual therapy to mobilize LE, proximal hip and/or LS spine soft tissue/joints for the purpose of modulating pain, promoting relaxation,  increasing ROM, reducing/eliminating soft tissue swelling/inflammation/restriction, improving soft tissue extensibility and allowing for proper ROM for normal function with self care, mobility, lifting and ambulation. Trigger point Dry Needling:  fine needle insertion into myofascial trigger points to stimulate a healing response  [] (87310) Needle insertion without injection: 1 or 2 muscles  [] (83280) Needle insertion without injection: 3 or more muscles    Modalities:  declined     [] GAME READY (VASO)- for significant edema, swelling, pain control.   x10'   [] ESU (HV/PM) for edema and pain control      Charges:  Timed Code Treatment Minutes: 38   Total Treatment Minutes: 38     Medicare total (add KX at $2000)  [address] certify that this patient meets one of the below criteria necessary for becoming an exception to the Medicare cap on therapy services:    [x]  The patient has a condition identified by an ICD-10 code that has a direct and significant impact on the need for therapy. (Significantly impacts the rate of recovery.)                   []  The patient has a complexity identified by an ICD-10 code that has a direct and significant impact on the need for therapy. (Significantly impacts the rate of recovery and is associated with a primary condition.)         []  The patient has associated variables that influence the amount of treatment to include:  Social support, self-efficacy/motivation, prognosis, time since onset/acuity. []  The patient has generalized musculoskeletal conditions or a condition affecting multiple sites that will have a direct impact on the rate of recovery. []  The patient had a prior episode of outpatient therapy during this calendar year for a different condition. []  The patient has a mental or cognitive disorder in addition to the condition being treated that will have a direct and significant impact on the rate of recovery. [] EVAL (LOW) 01601 (typically 20 minutes face-to-face)  [] EVAL (MOD) 34654 (typically 30 minutes face-to-face)  [] EVAL (HIGH) 08804 (typically 45 minutes face-to-face)  [] RE-EVAL     [x] WV(75526) x  1  [] IONTO  [x] NMR (29643) x   1  [] VASO  [x] Manual (70882) x  1  [] Other:  [] TA x      [] Mech Traction (01494)  [] ES(attended) (13107)     [] ES (un) (14716):             GOALS:      Patient stated goal: to be able to walk   [x] Progressing: [] Met: [] Not Met: [] Adjusted     Therapist goals for Patient:     Short Term Goals: To be achieved in: 2 weeks 2/2 MET    1. Independent in HEP and progression per patient tolerance, in order to prevent re-injury. [] Progressing: [x] Met: [] Not Met: [] Adjusted  2.  Patient will have a decrease in pain to facilitate improvement in movement, function, and ADLs as indicated by Functional Deficits. [] Progressing: [x] Met: [] Not Met: [] Adjusted     Long Term Goals: To be achieved in: 12 weeks        1. FOTO score to be equal or greater to the predicted score (57/100)  to assist with reaching prior level of function. [x] Progressing: [] Met: [] Not Met: [] Adjusted      2. Patient will demonstrate increased AROM to 0-120 to allow for getting in and out of car as indicated by patients Functional Deficits. [x] Progressing: [] Met: [] Not Met: [] Adjusted       3. Patient will demonstrate an increase in strength to 4+/5 to allow for sit to stand as indicated by patients Functional Deficits. [x] Progressing: [] Met: [] Not Met: [] Adjusted       4. Patient will return to all transfers, work activities, and functional activities without increased symptoms or restriction, specifically deep water aerobics. [x] Progressing: [] Met: [] Not Met: [] Adjusted       5. (Pt specific functional or activity goal)Pt will be able to walk for 20 minutes w/o walker and w/o increased s/s for grocery shopping. [x] Progressing: [] Met: [] Not Met: [] Adjusted  6. Pt will demonstrate pain decreased by 50%  (0-4/10) with all ADLS. [x] Progressing: [] Met: [] Not Met: [] Adjusted        ASSESSMENT:   Pt tolerated session well. PShe cont to struggle with FLX. t seemed somewhat lethargic this visit. Patient will benefit from continued skilled intervention to increase ROM, flexibility, strength and function. Overall Progression Towards Functional goals/ Treatment Progress Update:  [x] Patient is progressing as expected towards functional goals listed. [] Progression is slowed due to complexities/Impairments listed. [] Progression has been slowed due to co-morbidities.   [] Plan just implemented, too soon to assess goals progression <30days   [] Goals require adjustment due to lack of progress  [] Patient is not progressing as expected and requires additional follow up with physician  [] Other    Prognosis for POC: [x] Good [] Fair  [] Poor      Patient requires continued skilled intervention: [x] Yes  [] No    Treatment/Activity Tolerance:  [x] Patient able to complete treatment  [] Patient limited by fatigue  [x] Patient limited by pain    [] Patient limited by other medical complications  [] Other:             PLAN: Cont. PT 2x/week. Focus on ROM and strength, balance as well as gait and stairs. [x] Continue per plan of care [] Alter current plan (see comments above)  [] Plan of care initiated [] Hold pending MD visit [] Discharge      Electronically signed by:  Narciso Yan, PT, [id number]          Note: If patient does not return for scheduled/ recommended follow up visits, this note will serve as a discharge from care along with most recent update on progress.

## 2022-11-21 ENCOUNTER — HOSPITAL ENCOUNTER (OUTPATIENT)
Dept: PHYSICAL THERAPY | Age: 67
Setting detail: THERAPIES SERIES
Discharge: HOME | End: 2022-11-21
Payer: MEDICARE

## 2022-11-21 ENCOUNTER — HOSPITAL ENCOUNTER (OUTPATIENT)
Dept: WOMENS IMAGING | Age: 67
Discharge: HOME | End: 2022-11-21
Payer: MEDICARE

## 2022-11-21 DIAGNOSIS — Z78.0 POST-MENOPAUSAL: ICD-10-CM

## 2022-11-21 PROCEDURE — 97112 NEUROMUSCULAR REEDUCATION: CPT | Performed by: PHYSICAL THERAPIST

## 2022-11-21 PROCEDURE — 97110 THERAPEUTIC EXERCISES: CPT | Performed by: PHYSICAL THERAPIST

## 2022-11-21 PROCEDURE — 77080 DXA BONE DENSITY AXIAL: CPT

## 2022-11-21 PROCEDURE — 97140 MANUAL THERAPY 1/> REGIONS: CPT | Performed by: PHYSICAL THERAPIST

## 2022-11-21 NOTE — FLOWSHEET NOTE
[facility], [address] [place]  Phone: [phone]  Fax [phone]  :  Physical Therapy Treatment Note/ Progress Report:           Date:  2022    Patient Name:  Christal Byrd    :  1955  MRN: [id number]      Referring Physician: Irasema Vásquez MD                                                     Evaluation Date: 2022                                           Date of Referral:22     Insurance: Medicare     Next MD appt: scheduled:9/15/22     Medical Diagnosis:  R27.278 (ICD-10-CM) - S/P total knee arthroplasty, left        Treatment Diagnosis:  M25.662 L knee stiffness; M25.462 L knee effusion;  M25.562 L knee pain; difficulty walking R26.2  S/P L knee TKA 22           Precautions/ Contra-indications:         Has the plan of care been signed (Y/N):        [x]  Yes  []  No    Progress report will be due (10 Rx or 30 days ):   Visit 10 or 22 DONE visit 5 22  DUE visit 15 10/13/22 DONE visit 13 10/20/22  DUE visit 23 22 DONE visit 98/80/14      Recertification will be due (POC Duration  / 90 days ): 22     Is this a Progress Report:     []  Yes  [x]  No      If Yes:  Date Range for reporting period:  Beginning:10/20/22  Ending:               Visit # Date Range Insurance Allowable Requires auth   IN PERSON 19 22-22 BMN    [x]no        []yes:   TELEHEALTH       TOTAL              CX/NS       Next PT appt:                 PT Practice Pattern:H  Co morbidities:1-2  Surgical:L TKA  DOS 22  Nonsurgical  INITIAL VISIT 22 CURRENT VISIT 22   PAIN 0-10/10 4-8/10 0-1/10   FUNCTIONAL SCALE FOTO 18/100    ROM KE -5 0    KF 45                            STRENGHT (MMT) Quad tone trace Good -    KE  5/5    KF  5/5    HF  5/5    SLRHF  5/5    SLRHAB  5/5             Latex Allergy/Pacemaker/Adhesive allergy:  [x]NO      []YES     RESTRICTIONS/PRECAUTIONS: protocol         SUBJECTIVE:  Pt reports her knee feels really good she is just still stiff. She was able to go to the [place] and do water aerobics. Pain level:  0-1/10    OBJECTIVE:   Observation: no edema  Good patellar mobility  Good scar tissue mobility  Good EXT mobility            Exercises/Interventions:     HEP:  Francisca  Access Code: [email or web address]  URL: [email or web address]  Date: 08/31/2022  Prepared by: Chalmer Leyden      Therapeutic Ex (41349)  NMR re-education (95703) Reps/Sets/time Notes/CUES/Assessment HEP   See previous flowsheets for HEP                                                Cues for KF and not rotating pelvis    NV    NV    NV            VC    VC                                      1#                  Recumbent full rev for ROM 6'  2 position     Incline G stretch BLEs 4x20\"     Reformer  Gently warm up  KF stretch squats  Marching   2R  30x  4'  2'                            KE machine 10#  10x3\"     KF BLE 20#  3x10x3\" focus ECC                                                                               Pt education x6' Expected progress over the next 3-6 months          Manual Intervention ([id number])      IASTM w HG multi tool to ant knee, pes, ADD, RF, VMO and VL  GII post tob and rotation mobs for FLX  PROM FLX  Manual HS stretch, manual modified Neville stretch x10     reassessment x3'                                               Therapeutic Activity (45286)                                                Therapeutic Exercise and NMR EXR  [x] (97251) Provided verbal/tactile cueing for activities related to strengthening, flexibility, endurance, ROM for improvements in LE, proximal hip, and core control with self care, mobility, lifting, ambulation.   [x] (76984) Provided verbal/tactile cueing for activities related to improving balance, coordination, kinesthetic sense, posture, motor skill, proprioception  to assist with LE, proximal hip, and core control in self care, mobility, lifting, ambulation and eccentric single leg control. NMR and Therapeutic Activities:    [x] (96355 or 93906) Provided verbal/tactile cueing for activities related to improving balance, coordination, kinesthetic sense, posture, motor skill, proprioception and motor activation to allow for proper function of core, proximal hip and LE with self care and ADLs  [] (24059) Gait Re-education- Provided training and instruction to the patient for proper LE, core and proximal hip recruitment and positioning and eccentric body weight control with ambulation re-education including up and down stairs     Home Exercise Program:    [x] (12291) Reviewed/Progressed HEP activities related to strengthening, flexibility, endurance, ROM of core, proximal hip and LE for functional self-care, mobility, lifting and ambulation/stair navigation   [] (90731)Reviewed/Progressed HEP activities related to improving balance, coordination, kinesthetic sense, posture, motor skill, proprioception of core, proximal hip and LE for self care, mobility, lifting, and ambulation/stair navigation      Manual Treatments:  PROM / STM / Oscillations-Mobs:  G-I, II, III, IV (PA's, Inf., Post.)  [x] (84604) Provided manual therapy to mobilize LE, proximal hip and/or LS spine soft tissue/joints for the purpose of modulating pain, promoting relaxation,  increasing ROM, reducing/eliminating soft tissue swelling/inflammation/restriction, improving soft tissue extensibility and allowing for proper ROM for normal function with self care, mobility, lifting and ambulation. Trigger point Dry Needling:  fine needle insertion into myofascial trigger points to stimulate a healing response  [] (52879) Needle insertion without injection: 1 or 2 muscles  [] (55795) Needle insertion without injection: 3 or more muscles    Modalities:  declined     [] GAME READY (VASO)- for significant edema, swelling, pain control.   x10'   [] ESU (HV/PM) for edema and pain control      Charges:  Timed Code Treatment Minutes: 46   Total Treatment Minutes: 46     Medicare total (add KX at $2000)  [address] certify that this patient meets one of the below criteria necessary for becoming an exception to the Medicare cap on therapy services:    [x]  The patient has a condition identified by an ICD-10 code that has a direct and significant impact on the need for therapy. (Significantly impacts the rate of recovery.)                   []  The patient has a complexity identified by an ICD-10 code that has a direct and significant impact on the need for therapy. (Significantly impacts the rate of recovery and is associated with a primary condition.)         []  The patient has associated variables that influence the amount of treatment to include:  Social support, self-efficacy/motivation, prognosis, time since onset/acuity. []  The patient has generalized musculoskeletal conditions or a condition affecting multiple sites that will have a direct impact on the rate of recovery. []  The patient had a prior episode of outpatient therapy during this calendar year for a different condition. []  The patient has a mental or cognitive disorder in addition to the condition being treated that will have a direct and significant impact on the rate of recovery. [] EVAL (LOW) 85007 (typically 20 minutes face-to-face)  [] EVAL (MOD) 48026 (typically 30 minutes face-to-face)  [] EVAL (HIGH) 79736 (typically 45 minutes face-to-face)  [] RE-EVAL     [x] AG(48351) x  1  [] IONTO  [x] NMR (96106) x   1  [] VASO  [x] Manual (35722) x  1  [] Other:  [] TA x      [] Mech Traction (71746)  [] ES(attended) (36054)     [] ES (un) (63444):             GOALS:      Patient stated goal: to be able to walk   [x] Progressing: [] Met: [] Not Met: [] Adjusted     Therapist goals for Patient:     Short Term Goals: To be achieved in: 2 weeks 2/2 MET    1. progression <30days   [] Goals require adjustment due to lack of progress  [] Patient is not progressing as expected and requires additional follow up with physician  [] Other    Prognosis for POC: [x] Good [] Fair  [] Poor      Patient requires continued skilled intervention: [x] Yes  [] No    Treatment/Activity Tolerance:  [x] Patient able to complete treatment  [] Patient limited by fatigue  [x] Patient limited by pain    [] Patient limited by other medical complications  [] Other:             PLAN: Cont 1 visit to review advances in HEP and DC. FOTO NV    [x] Continue per plan of care [] Alter current plan (see comments above)  [] Plan of care initiated [] Hold pending MD visit [] Discharge      Electronically signed by:  Arlette Kate PT, [id number]          Note: If patient does not return for scheduled/ recommended follow up visits, this note will serve as a discharge from care along with most recent update on progress.

## 2022-11-28 ENCOUNTER — HOSPITAL ENCOUNTER (OUTPATIENT)
Dept: PHYSICAL THERAPY | Age: 67
Setting detail: THERAPIES SERIES
Discharge: HOME | End: 2022-11-28
Payer: MEDICARE

## 2022-11-28 PROCEDURE — 97110 THERAPEUTIC EXERCISES: CPT | Performed by: PHYSICAL THERAPIST

## 2022-11-28 PROCEDURE — 97140 MANUAL THERAPY 1/> REGIONS: CPT | Performed by: PHYSICAL THERAPIST

## 2022-11-28 RX ORDER — MELOXICAM 15 MG/1
TABLET ORAL
Qty: 30 TABLET | Refills: 0 | Status: SHIPPED | OUTPATIENT
Start: 2022-11-28

## 2022-11-28 NOTE — DISCHARGE SUMMARY
[facility], [address] [place]  Phone: [phone]  Fax [phone]  :  Physical Therapy Discharge Report:           Date:  2022    Patient Name:  Lucas Howard    :  1955  MRN: [id number]      Referring Physician: Mary Jo Dyson MD                                                     Evaluation Date: 2022                                           Date of Referral:22     Insurance: Medicare     Next MD appt: scheduled:      Medical Diagnosis:  B13.183 (ICD-10-CM) - S/P total knee arthroplasty, left        Treatment Diagnosis:  M25.662 L knee stiffness; M25.462 L knee effusion;  M25.562 L knee pain; difficulty walking R26.2  S/P L knee TKA 22           Precautions/ Contra-indications:         Has the plan of care been signed (Y/N):        [x]  Yes  []  No    Progress report will be due (10 Rx or 30 days ):   Visit 10 or 22 DONE visit 5 22  DUE visit 15 10/13/22 DONE visit 13 10/20/22  DUE visit 23 22 DONE visit //43      Recertification will be due (POC Duration  / 90 days ): 22     Is this a Progress Report:     [x]  Yes  []  No      If Yes:  Date Range for reporting period:  Beginning:10/20/22  Endin22              Visit # Date Range Insurance Allowable Requires auth   IN PERSON 20 22-22 BMN    [x]no        []yes:   TELEHEALTH       TOTAL              CX/NS       Next PT appt:                 PT Practice Pattern:H  Co morbidities:1-2  Surgical:L TKA  DOS 22  Nonsurgical  INITIAL VISIT 22 CURRENT VISIT 22   PAIN 0-10/10 4-8/10 0-1/10   FUNCTIONAL SCALE FOTO 18/100 70/100   ROM KE -5 0    KF 45 110                           STRENGHT (MMT) Quad tone trace Good -    KE  5/5    KF  5/5    HF  5/5    SLRHF  5/5    SLRHAB  5/5             Latex Allergy/Pacemaker/Adhesive allergy:  [x]NO      []YES     RESTRICTIONS/PRECAUTIONS: protocol         SUBJECTIVE:  Pt reports her knee feels really good she is just still stiff. She reports last week maybe 3x having nerve pain in her knee and in her hamstring sitting or laying down. She reports no hip or back pain. She feels good about her HEP and progression. Pt sees MD next Thursday. Pt reports stairs are still challenging, she has been not using the cane. She feels as if she just needs to manage her own steps. Pain level:  0-1/10    OBJECTIVE:   Observation: no edema  Good patellar mobility  Good scar tissue mobility  Good EXT mobility            Exercises/Interventions:     HEP:  [employer]  Access Code: [email or web address]  URL: [email or web address]  Date: 08/31/2022  Prepared by: Danika Hendricks CAP EXCEPTION DOCUMENTATION      I certify that this patient meets one of the below criteria necessary for becoming an exception to the Medicare cap on therapy services:    [x]  The patient has a condition identified by an ICD-10 code that has a direct and significant impact on the need for therapy. (Significantly impacts the rate of recovery.)                   []  The patient has a complexity identified by an ICD-10 code that has a direct and significant impact on the need for therapy. (Significantly impacts the rate of recovery and is associated with a primary condition.)         []  The patient has associated variables that influence the amount of treatment to include:  Social support, self-efficacy/motivation, prognosis, time since onset/acuity. []  The patient has generalized musculoskeletal conditions or a condition affecting multiple sites that will have a direct impact on the rate of recovery. []  The patient had a prior episode of outpatient therapy during this calendar year for a different condition.            []  The patient has a mental or cognitive disorder in addition to the condition being treated that will have a direct and significant impact on the rate of recovery. GOALS:      Patient stated goal: to be able to walk   [] Progressing: [x] Met: [] Not Met: [] Adjusted     Therapist goals for Patient:     Short Term Goals: To be achieved in: 2 weeks 2/2 MET    1. Independent in HEP and progression per patient tolerance, in order to prevent re-injury. [] Progressing: [x] Met: [] Not Met: [] Adjusted  2. Patient will have a decrease in pain to facilitate improvement in movement, function, and ADLs as indicated by Functional Deficits. [] Progressing: [x] Met: [] Not Met: [] Adjusted     Long Term Goals: To be achieved in: 12 weeks   5/6 MET     1. FOTO score to be equal or greater to the predicted score (57/100)  to assist with reaching prior level of function. [] Progressing: [x] Met: [] Not Met: [] Adjusted      2. Patient will demonstrate increased AROM to 0-120 to allow for getting in and out of car as indicated by patients Functional Deficits. [x] Progressing: [] Met: [] Not Met: [] Adjusted       3. Patient will demonstrate an increase in strength to 4+/5 to allow for sit to stand as indicated by patients Functional Deficits. [] Progressing: [x] Met: [] Not Met: [] Adjusted       4. Patient will return to all transfers, work activities, and functional activities without increased symptoms or restriction, specifically deep water aerobics. [] Progressing: [x] Met: [] Not Met: [] Adjusted       5. (Pt specific functional or activity goal)Pt will be able to walk for 20 minutes w/o walker and w/o increased s/s for grocery shopping. [] Progressing: [x] Met: [] Not Met: [] Adjusted  6. Pt will demonstrate pain decreased by 50%  (0-4/10) with all ADLS. [] Progressing: [x] Met: [] Not Met: [] Adjusted        ASSESSMENT:   Pt tolerated session well. She cont to struggle with FLX. Patient will benefit from one further visit to go over advances in HEP.       Overall Progression Towards Functional goals/ Treatment Progress Update:  [x] Patient is progressing as expected towards functional goals listed. [] Progression is slowed due to complexities/Impairments listed. [] Progression has been slowed due to co-morbidities. [] Plan just implemented, too soon to assess goals progression <30days   [] Goals require adjustment due to lack of progress  [] Patient is not progressing as expected and requires additional follow up with physician  [] Other    Prognosis for POC: [x] Good [] Fair  [] Poor      Patient requires continued skilled intervention: [x] Yes  [] No    Treatment/Activity Tolerance:  [x] Patient able to complete treatment  [] Patient limited by fatigue  [x] Patient limited by pain    [] Patient limited by other medical complications  [] Other:             PLAN: DC to HEP. Pt verbalizes understanding of HEP continuation and to contact PT with any questions or concerns. [x] Continue per plan of care [] Alter current plan (see comments above)  [] Plan of care initiated [] Hold pending MD visit [x] Discharge      Electronically signed by:  Carolyn Mao, PT, [id number]          Note: If patient does not return for scheduled/ recommended follow up visits, this note will serve as a discharge from care along with most recent update on progress.

## 2022-11-28 NOTE — FLOWSHEET NOTE
[facility], [address] [place]  Phone: [phone]  Fax [phone]  :  Physical Therapy Treatment Note/ Progress Report:           Date:  2022    Patient Name:  Noam Byrd    :  1955  MRN: [id number]      Referring Physician: Diana Sevilla MD                                                     Evaluation Date: 2022                                           Date of Referral:22     Insurance: Medicare     Next MD appt: scheduled:      Medical Diagnosis:  V70.570 (ICD-10-CM) - S/P total knee arthroplasty, left        Treatment Diagnosis:  M25.662 L knee stiffness; M25.462 L knee effusion;  M25.562 L knee pain; difficulty walking R26.2  S/P L knee TKA 22           Precautions/ Contra-indications:         Has the plan of care been signed (Y/N):        [x]  Yes  []  No    Progress report will be due (10 Rx or 30 days ):   Visit 10 or 22 DONE visit 5 22  DUE visit 15 10/13/22 DONE visit 13 10/20/22  DUE visit 23 22 DONE visit 79//97      Recertification will be due (POC Duration  / 90 days ): 22     Is this a Progress Report:     [x]  Yes  []  No      If Yes:  Date Range for reporting period:  Beginning:10/20/22  Endin22              Visit # Date Range Insurance Allowable Requires auth   IN PERSON 20 22-22 BMN    [x]no        []yes:   TELEHEALTH       TOTAL              CX/NS       Next PT appt:                 PT Practice Pattern:H  Co morbidities:1-2  Surgical:L TKA  DOS 22  Nonsurgical  INITIAL VISIT 22 CURRENT VISIT 22   PAIN 0-10/10 4-8/10 0-1/10   FUNCTIONAL SCALE FOTO 18/100 70/100   ROM KE -5 0    KF 45 110                           STRENGHT (MMT) Quad tone trace Good -    KE  5/5    KF  5/5    HF  5/5    SLRHF  5/5    SLRHAB  5/5             Latex Allergy/Pacemaker/Adhesive allergy:  [x]NO []YES     RESTRICTIONS/PRECAUTIONS: protocol         SUBJECTIVE:  Pt reports her knee feels really good she is just still stiff. She reports last week maybe 3x having nerve pain in her knee and in her hamstring sitting or laying down. She reports no hip or back pain. She feels good about her HEP and progression. Pt sees MD next Thursday. Pt reports stairs are still challenging, she has been not using the cane. She feels as if she just needs to manage her own steps. Pain level:  0-1/10    OBJECTIVE:   Observation: no edema  Good patellar mobility  Good scar tissue mobility  Good EXT mobility            Exercises/Interventions:     HEP:  Medbridge  Access Code: [email or web address]  URL: [email or web address]  Date: 08/31/2022  Prepared by: Chalmer Leyden      Therapeutic Ex (59913)  NMR re-education (23813) Reps/Sets/time Notes/CUES/Assessment HEP   See previous flowsheets for HEP                                                Cues for KF and not rotating pelvis    NV    NV    NV            VC    VC                                      1#                  Recumbent full rev for ROM 6'  2 position     Incline G stretch BLEs 4x20\"                                                                                                               Pt education/reassessment x10' Expected progress over the next 3-6 months  stairs          Manual Intervention ([id number])      STM ant knee, pes, ADD, RF, VMO and VL  GII post tob and rotation mobs for FLX  PROM FLX  Manual HS stretch, manual modified Neville stretch x15'     reassessment x3'                                               Therapeutic Activity (50033)                                                Therapeutic Exercise and NMR EXR  [x] (48559) Provided verbal/tactile cueing for activities related to strengthening, flexibility, endurance, ROM for improvements in LE, proximal hip, and core control with self care, mobility, lifting, ambulation.  [] (55505) Provided verbal/tactile cueing for activities related to improving balance, coordination, kinesthetic sense, posture, motor skill, proprioception  to assist with LE, proximal hip, and core control in self care, mobility, lifting, ambulation and eccentric single leg control. NMR and Therapeutic Activities:    [] (77589 or 74117) Provided verbal/tactile cueing for activities related to improving balance, coordination, kinesthetic sense, posture, motor skill, proprioception and motor activation to allow for proper function of core, proximal hip and LE with self care and ADLs  [] (21196) Gait Re-education- Provided training and instruction to the patient for proper LE, core and proximal hip recruitment and positioning and eccentric body weight control with ambulation re-education including up and down stairs     Home Exercise Program:    [x] (11823) Reviewed/Progressed HEP activities related to strengthening, flexibility, endurance, ROM of core, proximal hip and LE for functional self-care, mobility, lifting and ambulation/stair navigation   [] (20642)Reviewed/Progressed HEP activities related to improving balance, coordination, kinesthetic sense, posture, motor skill, proprioception of core, proximal hip and LE for self care, mobility, lifting, and ambulation/stair navigation      Manual Treatments:  PROM / STM / Oscillations-Mobs:  G-I, II, III, IV (PA's, Inf., Post.)  [x] (07516) Provided manual therapy to mobilize LE, proximal hip and/or LS spine soft tissue/joints for the purpose of modulating pain, promoting relaxation,  increasing ROM, reducing/eliminating soft tissue swelling/inflammation/restriction, improving soft tissue extensibility and allowing for proper ROM for normal function with self care, mobility, lifting and ambulation.      Trigger point Dry Needling:  fine needle insertion into myofascial trigger points to stimulate a healing response  [] (17155) Needle insertion without injection: 1 or 2 muscles  [] (77373) Needle insertion without injection: 3 or more muscles    Modalities:  declined     [] GAME READY (VASO)- for significant edema, swelling, pain control. x10'   [] ESU (HV/PM) for edema and pain control      Charges:  Timed Code Treatment Minutes: 38   Total Treatment Minutes: 38     Medicare total (add KX at $2000)  2100      MEDICARE CAP EXCEPTION DOCUMENTATION      I certify that this patient meets one of the below criteria necessary for becoming an exception to the Medicare cap on therapy services:    [x]  The patient has a condition identified by an ICD-10 code that has a direct and significant impact on the need for therapy. (Significantly impacts the rate of recovery.)                   []  The patient has a complexity identified by an ICD-10 code that has a direct and significant impact on the need for therapy. (Significantly impacts the rate of recovery and is associated with a primary condition.)         []  The patient has associated variables that influence the amount of treatment to include:  Social support, self-efficacy/motivation, prognosis, time since onset/acuity. []  The patient has generalized musculoskeletal conditions or a condition affecting multiple sites that will have a direct impact on the rate of recovery. []  The patient had a prior episode of outpatient therapy during this calendar year for a different condition. []  The patient has a mental or cognitive disorder in addition to the condition being treated that will have a direct and significant impact on the rate of recovery.           [] EVAL (LOW) 19540 (typically 20 minutes face-to-face)  [] EVAL (MOD) 22854 (typically 30 minutes face-to-face)  [] EVAL (HIGH) 75949 (typically 45 minutes face-to-face)  [] RE-EVAL     [x] TC(71910) x  2  [] IONTO  [] NMR (00101) x     [] VASO  [x] Manual (94693) x  1  [] Other:  [] TA x      [] Mech Traction (45331)  [] ES(attended) (65934)     [] ES (un) (76494): GOALS:      Patient stated goal: to be able to walk   [] Progressing: [x] Met: [] Not Met: [] Adjusted     Therapist goals for Patient:     Short Term Goals: To be achieved in: 2 weeks 2/2 MET    1. Independent in HEP and progression per patient tolerance, in order to prevent re-injury. [] Progressing: [x] Met: [] Not Met: [] Adjusted  2. Patient will have a decrease in pain to facilitate improvement in movement, function, and ADLs as indicated by Functional Deficits. [] Progressing: [x] Met: [] Not Met: [] Adjusted     Long Term Goals: To be achieved in: 12 weeks   5/6 MET     1. FOTO score to be equal or greater to the predicted score (57/100)  to assist with reaching prior level of function. [] Progressing: [x] Met: [] Not Met: [] Adjusted      2. Patient will demonstrate increased AROM to 0-120 to allow for getting in and out of car as indicated by patients Functional Deficits. [x] Progressing: [] Met: [] Not Met: [] Adjusted       3. Patient will demonstrate an increase in strength to 4+/5 to allow for sit to stand as indicated by patients Functional Deficits. [] Progressing: [x] Met: [] Not Met: [] Adjusted       4. Patient will return to all transfers, work activities, and functional activities without increased symptoms or restriction, specifically deep water aerobics. [] Progressing: [x] Met: [] Not Met: [] Adjusted       5. (Pt specific functional or activity goal)Pt will be able to walk for 20 minutes w/o walker and w/o increased s/s for grocery shopping. [] Progressing: [x] Met: [] Not Met: [] Adjusted  6. Pt will demonstrate pain decreased by 50%  (0-4/10) with all ADLS. [] Progressing: [x] Met: [] Not Met: [] Adjusted        ASSESSMENT:   Pt tolerated session well. She cont to struggle with FLX. Patient will benefit from one further visit to go over advances in HEP.       Overall Progression Towards Functional goals/ Treatment Progress Update:  [x] Patient is progressing as expected towards functional goals listed. [] Progression is slowed due to complexities/Impairments listed. [] Progression has been slowed due to co-morbidities. [] Plan just implemented, too soon to assess goals progression <30days   [] Goals require adjustment due to lack of progress  [] Patient is not progressing as expected and requires additional follow up with physician  [] Other    Prognosis for POC: [x] Good [] Fair  [] Poor      Patient requires continued skilled intervention: [x] Yes  [] No    Treatment/Activity Tolerance:  [x] Patient able to complete treatment  [] Patient limited by fatigue  [x] Patient limited by pain    [] Patient limited by other medical complications  [] Other:             PLAN: DC to HEP. Pt verbalizes understanding of HEP continuation and to contact PT with any questions or concerns. [x] Continue per plan of care [] Alter current plan (see comments above)  [] Plan of care initiated [] Hold pending MD visit [] Discharge      Electronically signed by:  Nova Merlin, PT, [id number]          Note: If patient does not return for scheduled/ recommended follow up visits, this note will serve as a discharge from care along with most recent update on progress.

## 2022-12-08 ENCOUNTER — OFFICE VISIT (OUTPATIENT)
Dept: ORTHOPEDIC SURGERY | Age: 67
End: 2022-12-08

## 2022-12-08 VITALS — BODY MASS INDEX: 30.39 KG/M2 | WEIGHT: 178 LBS | HEIGHT: 64 IN

## 2022-12-08 DIAGNOSIS — Z96.652 S/P TOTAL KNEE ARTHROPLASTY, LEFT: Primary | ICD-10-CM

## 2022-12-08 RX ORDER — CLINDAMYCIN HYDROCHLORIDE 300 MG/1
CAPSULE ORAL
Qty: 6 CAPSULE | Refills: 0 | Status: SHIPPED | OUTPATIENT
Start: 2022-12-08

## 2022-12-08 NOTE — PROGRESS NOTES
Dr Nii Catalan      Date /Time 12/8/2022       9:19 AM EDT  Name Johnny Negron             1955   Location  [facility]  MRN [id number]                Chief Complaint   Patient presents with    Follow-up     Left TKA 08/29/2022        History of Present Illness      Johnny Negron is a 78 y.o. female is here for post-op visit after LEFT  99439 Total Knee Arthroplasty    Patient presents to the office today for follow-up visit. Patient is approximately 3.5 months status post left total knee arthroplasty. Patient doing well. Pain controlled. Patient denies any fever, chills, or drainage. Patient has graduated physical therapy. Patient is participating in home exercises    Physical Exam    Based off 1997 Exam Criteria    Ht 5' 4\" (1.626 m)   Wt 178 lb (80.7 kg)   LMP  (LMP Unknown)   BMI 30.55 kg/m²      Constitutional:       General: He is not in acute distress. Appearance: Normal appearance. LEFT Knee: incision clean, intact, healing appropriately. No surrounding  erythema or fluctuance. Neuro intact distal. No evidence of DVT. Range of motion: 0-115    Imaging         X-rays were ordered today reviewed of the left knee. 3 views. AP, lateral, and skyline views. They demonstrate no evidence of periprosthetic fractures or loosening. Total knee arthroplasty in good position. Assessment and Plan    Rodrick Santos was seen today for follow-up. Diagnoses and all orders for this visit:    S/P total knee arthroplasty, left  -     XR KNEE LEFT (3 VIEWS); Future      Patient is doing well. Patient will continue with home exercises. We have discussed predental and preinvasive procedure antibiotics. I did send predental antibiotics to her pharmacy today. Patient will follow up 1 year from surgery or sooner if problems arise    I discussed with Johnny Negron that her history, symptoms, signs, and imaging are most consistent with previous TKA replacement.     We reviewed the natural history of these conditions and treatment options ranging from conservative measures (rest, icing, activity modification, physical therapy, pain meds) to surgical options. In terms of treatment, I recommended continuing with rest, icing, avoidance of painful activities, NSAIDs or pain meds as tolerated, and physical therapy. We discussed surgical options as well, should conservative measures fail. Electronically signed by Tania Long PA-C on 12/8/2022 at 9:29 AM  This dictation was generated by voice recognition computer software. Although all attempts are made to edit the dictation for accuracy, there may be errors in the transcription that are not intended.

## 2022-12-27 RX ORDER — MELOXICAM 15 MG/1
TABLET ORAL
Qty: 30 TABLET | Refills: 0 | Status: SHIPPED | OUTPATIENT
Start: 2022-12-27

## 2023-01-08 SDOH — HEALTH STABILITY: PHYSICAL HEALTH: ON AVERAGE, HOW MANY MINUTES DO YOU ENGAGE IN EXERCISE AT THIS LEVEL?: 30 MIN

## 2023-01-08 SDOH — HEALTH STABILITY: PHYSICAL HEALTH: ON AVERAGE, HOW MANY DAYS PER WEEK DO YOU ENGAGE IN MODERATE TO STRENUOUS EXERCISE (LIKE A BRISK WALK)?: 3 DAYS

## 2023-01-08 ASSESSMENT — SOCIAL DETERMINANTS OF HEALTH (SDOH)

## 2023-01-10 ENCOUNTER — OFFICE VISIT (OUTPATIENT)
Dept: ORTHOPEDIC SURGERY | Age: 68
End: 2023-01-10

## 2023-01-10 VITALS — BODY MASS INDEX: 30.39 KG/M2 | HEIGHT: 64 IN | WEIGHT: 178 LBS

## 2023-01-10 DIAGNOSIS — Z01.818 PREOP TESTING: ICD-10-CM

## 2023-01-10 DIAGNOSIS — G89.29 CHRONIC PAIN OF RIGHT KNEE: Primary | ICD-10-CM

## 2023-01-10 DIAGNOSIS — M17.11 PRIMARY OSTEOARTHRITIS OF RIGHT KNEE: ICD-10-CM

## 2023-01-10 DIAGNOSIS — M25.561 CHRONIC PAIN OF RIGHT KNEE: Primary | ICD-10-CM

## 2023-01-10 NOTE — PROGRESS NOTES
Dr Sheldon Ruiz      Date /Time 1/10/2023       1:34 PM EST  Name Jose F Patel             1955   Location  [address] [facility] SURG  MRN [id number]                Chief Complaint   Patient presents with    Knee Pain     Right Knee        History of Present Illness  Jose F Patel is a 78 y.o. female who presents with  right knee pain, . Patient presents to the office today for a follow-up visit. She did have a left total knee arthroplasty by myself 2022. The left knee is doing well. She does occasionally have an achy pain but she has been able to return to normal activity level for her without difficulty. She is very happy with the left knee. Patient is actually here for follow-up visit concerning her right knee. She has been treated for osteoarthritis by us in the past.  She has received cortisone injections and viscosupplementation injections with minimal relief. She has also tried NSAIDs, activity modifications, and physical therapy without lasting improvement. Right knee is mostly painful over the medial aspect.   She does have increased pain with activities and improvement with rest.    Past History  Past Medical History:   Diagnosis Date    Hyperlipidemia     Menopause     Menopause     OA (osteoarthritis)      (spontaneous vaginal delivery)          Past Surgical History:   Procedure Laterality Date    COLONOSCOPY  02/15/2001, 2006, 10/1/12    TOTAL KNEE ARTHROPLASTY Left 2022    LEFT TOTAL KNEE ARTHROPLASTY WITH ADDUCTOR CANAL BLOCK FOR PAIN CONTROL                  [place] performed by Sheldon Ruiz MD at [facility] History     Tobacco Use    Smoking status: Never    Smokeless tobacco: Never   Substance Use Topics    Alcohol use: No      Current Outpatient Medications on File Prior to Visit   Medication Sig Dispense Refill    meloxicam (MOBIC) 15 MG tablet TAKE 1 TABLET BY MOUTH EVERY DAY 30 tablet 0 clindamycin (CLEOCIN) 300 MG capsule Take 1 tablet by mouth twice a day starting the day before the procedure and finishing the day after 6 capsule 0    Multiple Vitamins-Minerals (THERAPEUTIC MULTIVITAMIN-MINERALS) tablet Take 1 tablet by mouth daily      [DISCONTINUED] Red Yeast Rice Extract (RED YEAST RICE PO) Take by mouth      [DISCONTINUED] diclofenac (VOLTAREN) 50 MG EC tablet Take 1 tablet by mouth 2 times daily (with meals) 60 tablet 3    vitamin D (CHOLECALCIFEROL) 400 UNITS TABS tablet Take 1,000 Units by mouth daily        No current facility-administered medications on file prior to visit. ASCVD 10-YEAR RISK SCORE  The 10-year ASCVD risk score (Milagro VINCENT, et al., 2019) is: 6.8%    Values used to calculate the score:      Age: 78 years      Sex: Female      Is Non- : No      Diabetic: No      Tobacco smoker: No      Systolic Blood Pressure: 943 mmHg      Is BP treated: No      HDL Cholesterol: 67 mg/dL      Total Cholesterol: 268 mg/dL     Review of Systems  10-point ROS is negative other than HPI. Physical Exam  Based off 1997 Exam Criteria  Ht 5' 4\" (1.626 m)   Wt 178 lb (80.7 kg)   LMP  (LMP Unknown)   BMI 30.55 kg/m²      Constitutional:       General: He is not in acute distress. Appearance: Normal appearance. Cardiovascular:      Rate and Rhythm: Normal rate and regular rhythm. Pulses: Normal pulses. Pulmonary:      Effort: Pulmonary effort is normal. No respiratory distress. Neurological:      Mental Status: He is alert and oriented to person, place, and time. Mental status is at baseline. Skin: Mean, dry, intact. No open sores  Lymphatics: No palpable lymph nodes    Musculoskeletal:  Gait:  altered  Lumbar spine: There is no swelling, warmth, or erythema. Range of motion is within normal limits. There is no paraspinal or spinous process tenderness. . The distal neurovascular exam is grossly intact and symmetric.   [place] Hip: Examination of the right and left hip reveals intact skin. The patient demonstrates full painless range of motion with regards to flexion, abduction, internal and external rotation. There is no tenderness about the greater trochanter. R Knee: Physical exam of the knee demonstrates painful range of motion 5-120. tenderness over the medial joint line. No gross instability to either varus or valgus stress test.  L Knee: Physical exam the left knee demonstrates no significant swelling. Well-healed incision. Pain-free range of motion 0-115. No gross instability to either varus or valgus stress test.      Imaging  Right Knee: [facility]  Radiographs: X-rays were ordered today and reviewed of the right knee. 4 views. Standing AP, standing AP flexed, lateral, and skyline views. They demonstrate dense medial compartment joint space thinning and osteophyte formation. Bone-on-bone contact medially. Significant sclerosis present with osteophytes. Procedure:  Orders Placed This Encounter   Procedures    XR KNEE RIGHT (MIN 4 VIEWS)     Standing Status:   Future     Number of Occurrences:   1     Standing Expiration Date:   1/9/2024       Assessment and Plan  Sherry Chery was seen today for knee pain. Diagnoses and all orders for this visit:    Chronic pain of right knee  -     XR KNEE RIGHT (MIN 4 VIEWS); Future        Patient has end-stage arthritis of her right knee. She will proceed with a right total knee arthroplasty. Her left knee continues to do well and will continue with home exercises. I discussed with Missy Espinosa that her history, symptoms, signs, and imaging are most consistent with knee arthritis    We reviewed the natural history of these conditions and treatment options ranging from conservative measures (rest, icing, activity modification, physical therapy, pain meds, cortisone injection)  to surgical options. We had a long discussion with the patient about their knee.  We discussed surgical and non surgical options for knee arthritis. The most important thing is to work to maintain their range of motion. Next they can try medications including tylenol and NSAIDs. They can try glucosamine or chondroitin. They should also ice frequently and avoid activities that make their knee hurt. Cortisone injections and Synvisc injections are also options when medicine has failed. We finally discussed surgical options including arthroscopic debridement versus knee replacement. Often the arthritis is too far gone for an arthroscopic debridement and pain relief will be short term. Their ultimate solution will be a knee replacement when they are ready for it. They should put it off until they can no longer stand the pain and when nothing else has worked. Conservative measures have failed. She is not interested in cortisone injections. I think she is an appropriate candidate for surgery due to her ongoing symptoms and dysfunction despite conservative measures. The procedure would be Right medacta  43521 Total Knee Arthroplasty    Additional imaging needed: Radiographs, MRI, MRI arthrogram, and CT arthrogram.    Perioperative considerations include: Pre-operative clearance from medical subspecialty. We reviewed the risks, benefits, alternatives of this approach. We discussed risks including, but not limited to, bleeding, pain, infection, scarring, damage to the neurovascular structures, blood clots, pulmonary embolus, stiffness, implant instability or loosening, implant failure, incomplete relief of pain, and incomplete return of function. We also reviewed the surgical details, expected recovery, and rehabilitation (6-9 months). She expressed understanding and will undergo preoperative medical evaluation and optimization. Electronically signed by Lucero Cobian MD on 1/10/2023 at 1:34 PM  This dictation was generated by voice recognition computer software.   Although all attempts are made to edit the dictation for accuracy, there may be errors in the transcription that are not intended.

## 2023-01-10 NOTE — LETTER
[facility]  Surgery Scheduling Form:    01/10/23     DEMOGRAPHICS    Patient Name:  Muriel Galaviz  Patient :  1955   Patient SS#:  [id number]    Patient Phone:  [phone] (home)  Alt. Patient Phone:    Patient Address:  [place] [address]    PCP:  ADITI Sousa CNP  Insurance:  Payor: MEDICARE / Plan: MEDICARE PART A AND B / Product Type: *No Product type* /   Insurance ID Number:  Payer/Plan Subscr  Sex Relation Sub. Ins. ID Effective Group Num   1. MEDICARE - Monica Siemens 1955 Female Self [id number] 21                                    [address] [address]   2.  [address] [place] 1955 Female Self [id number]                                    [address]       DIAGNOSIS & PROCEDURE    Diagnosis: Right Knee  M17.11 Primary Osteoarthritis    Operation: RIGHT Knee  43538 Total Knee Arthroplasty    Provider:  Angelo Khan MD    Location:  [facility] INFORMATION    Requested Date:  2023   Requested Time:  8:30      Patient Arrival Time:  6:30 am   OR Time Required:  110 Minutes  Admission:  []Outpatient   []23 hour  [x]Same Day Admit:   1-2  days  []Inpatient    Anesthesia:  []General  [x]Spinal  [x]MAC/Sedation  Regional Anesthesia:  []None  []Lumbar Plexus Block  []Fascia Iliaca  []Femoral  [x]Adductor canal  []Interscalene Block  []Insert Catheter  []OrthoMix []Exparel       EQUIPMENT    Position:  [x]Supine  []Lateral  []Beach-chair  []Prone    OR Bed:  [x]Regular  [x]DeMayo knee positioner  []Foster    Radiology:  []Large C-arm  []Small C-arm  []Portable X-ray    Implants:  Medacta Knee:  [x]Primary Set  []Revision Set  [employer] Knee:  []Primary Set []Revision Set    Pre-op Orders      [] Ortho mix     Ropivacaine 0.2% 30 mL    Bupivacaine-epinephrine 0.25% 30 mL    Dexamethasone 4 mg    Toradol 30 mg    Clonidine 100 mcg    Base-sodium chloride 0.9% to 30 mL  []On Q ball   600 mL ropivacaine 0.2%    SUTURE: []#5 Ethibond  [x]#2 Ethibond  [x]#2 Quill  []#1 PDS  [x]#1 Vicryl                   [x]2-0 Vicryl  [x]3-0 Monocryl  []2-0 Nylon  []3-0 Nylon  []3-0 PDS                    []Dermabond  []Steri-strips (in half)  DRESSING:  [x]Prineo dermabond  []4x4 gauze  [x]ABDs  [x]Tegaderm                         []Staples  []Pravena incisional vac  BRACE: []Pelvic Binder  []Hip X-ACT  []Knee TROM  [x]Knee immobilizer                 []Shoulder Immob. (w/abd. pillow)  []Sling  []Ice Unit  [x]Ace-Wrap      []Jose [employer]to [employer]:  Ami Gilbert [phone], [email or web address]  [x]Medacta: Popeye Weiss [phone], [email or web address]  []Fx Shoulder: Rafael Freedman [phone], [email or web address]  []Teri: Ladonna Romo [phone], [email or web address]    Comments:        Mary Jo Dyson MD  [address] [facility]  1/10/2023       1:38 PM EST

## 2023-01-12 NOTE — PROGRESS NOTES
Niru Form    Age 78 y.o.    female    1955    MRN [id number]    3/6/2023  Arrival Time_____________  OR Time____________[address]     Procedure(s):  RIGHT TOTAL KNEE ARTHROPLASTY               MEDACTA NOTE:  ADDUCTOR CANAL BLOCK                      Spinal   Surgeon(s): Reginald James, MD      DAY ADMIT ___  SDS/OP ___  OUTPT IN BED ___        Phone [phone] (home)     PCP _____________________ Phone_________________ Epic ( ) Epic CE ( ) Appt ________    ADDITIONAL INFO __________________________________ Cardio/Consult _____________    NOTES _____________________________________________________________________    ____________________________________________________________________________    PAT APPT DATE:________ TIME: ________  FAXED QAD: _______  (__) H&P w/ Hospitalist  __________________________________________________________________________  Preop Nurse phone screen complete: _____________  (__) CBC     (__) W/ DIFF ___________     (__) Hgb A1C    ___________  (__) CHEST X RAY   __________  (__) LIPID PROFILE  ___________  (__) EKG   __________  (__) PT/PTT   ___________  (__) PFT's   __________  (__) BMP   ___________  (__) CAROTIDS  __________  (__) CMP   ___________  (__) VEIN MAPPING  __________  (__) U/A   ___________  (__) HISTORY & PHYSICAL __________  (__) URINE C & S  ___________  (__) CARDIAC CLEARANCE __________  (__) U/A W/ FLEX  ___________  (__) PULM.  CLEARANCE __________  (__) SERUM PREGNANCY ___________  (__) Check Epic DOS orders __________  (__) TYPE & SCREEN __________repeat ( ) (__)  __________________ __________  (__) ALBUMIN   ___________  (__)  __________________ __________  (__) TRANSFERRIN  ___________  (__)  __________________ __________  (__) LIVER PROFILE  ___________  (__)  __________________ __________  (__) MRSA NASAL SWAB ___________  (__) URINE PREG DOS __________  (__) SED RATE  ___________  (__) BLOOD SUGAR DOS __________  (__) C-REACTIVE PROTEIN ___________    (__) VITAMIN D HYDROXY ___________  (__) BLOOD THINNERS __________    (__) ACE/ ARBS: _____________________     (__) BETABLOCKERS __________________

## 2023-01-16 DIAGNOSIS — M17.11 PRIMARY OSTEOARTHRITIS OF RIGHT KNEE: Primary | ICD-10-CM

## 2023-01-19 ENCOUNTER — HOSPITAL ENCOUNTER (OUTPATIENT)
Dept: CT IMAGING | Age: 68
Discharge: HOME | End: 2023-01-19
Payer: MEDICARE

## 2023-01-19 ENCOUNTER — HOSPITAL ENCOUNTER (OUTPATIENT)
Age: 68
Discharge: HOME | End: 2023-01-19
Payer: MEDICARE

## 2023-01-19 DIAGNOSIS — M17.11 PRIMARY OSTEOARTHRITIS OF RIGHT KNEE: ICD-10-CM

## 2023-01-19 DIAGNOSIS — Z01.818 PREOP TESTING: ICD-10-CM

## 2023-01-19 LAB
ABO/RH: NORMAL
ALBUMIN SERPL-MCNC: 4.5 G/DL (ref 3.4–5)
ANION GAP SERPL CALCULATED.3IONS-SCNC: 12 MMOL/L (ref 3–16)
ANTIBODY SCREEN: NORMAL
APTT: 36.2 SEC (ref 23–34.3)
BASOPHILS ABSOLUTE: 0.1 K/UL (ref 0–0.2)
BASOPHILS RELATIVE PERCENT: 1.3 %
BILIRUBIN URINE: NEGATIVE
BLOOD, URINE: NEGATIVE
BUN BLDV-MCNC: 13 MG/DL (ref 7–20)
CALCIUM SERPL-MCNC: 9.9 MG/DL (ref 8.3–10.6)
CHLORIDE BLD-SCNC: 102 MMOL/L (ref 99–110)
CLARITY: CLEAR
CO2: 26 MMOL/L (ref 21–32)
COLOR: YELLOW
CREAT SERPL-MCNC: 0.6 MG/DL (ref 0.6–1.2)
EOSINOPHILS ABSOLUTE: 0.1 K/UL (ref 0–0.6)
EOSINOPHILS RELATIVE PERCENT: 1.9 %
GFR SERPL CREATININE-BSD FRML MDRD: >60 ML/MIN/{1.73_M2}
GLUCOSE BLD-MCNC: 94 MG/DL (ref 70–99)
GLUCOSE URINE: NEGATIVE MG/DL
HCT VFR BLD CALC: 38.7 % (ref 36–48)
HEMOGLOBIN: 12.5 G/DL (ref 12–16)
INR BLD: 0.93 (ref 0.87–1.14)
KETONES, URINE: NEGATIVE MG/DL
LEUKOCYTE ESTERASE, URINE: ABNORMAL
LYMPHOCYTES ABSOLUTE: 2.5 K/UL (ref 1–5.1)
LYMPHOCYTES RELATIVE PERCENT: 40.5 %
MCH RBC QN AUTO: 28.5 PG (ref 26–34)
MCHC RBC AUTO-ENTMCNC: 32.3 G/DL (ref 31–36)
MCV RBC AUTO: 88.3 FL (ref 80–100)
MICROSCOPIC EXAMINATION: YES
MONOCYTES ABSOLUTE: 0.4 K/UL (ref 0–1.3)
MONOCYTES RELATIVE PERCENT: 6.9 %
NEUTROPHILS ABSOLUTE: 3.1 K/UL (ref 1.7–7.7)
NEUTROPHILS RELATIVE PERCENT: 49.4 %
NITRITE, URINE: NEGATIVE
PDW BLD-RTO: 13.6 % (ref 12.4–15.4)
PH UA: 7.5 (ref 5–8)
PLATELET # BLD: 273 K/UL (ref 135–450)
PMV BLD AUTO: 8.2 FL (ref 5–10.5)
POTASSIUM SERPL-SCNC: 4.5 MMOL/L (ref 3.5–5.1)
PROTEIN UA: NEGATIVE MG/DL
PROTHROMBIN TIME: 12.4 SEC (ref 11.7–14.5)
RBC # BLD: 4.38 M/UL (ref 4–5.2)
RBC UA: NORMAL /HPF (ref 0–4)
SODIUM BLD-SCNC: 140 MMOL/L (ref 136–145)
SPECIFIC GRAVITY UA: 1.02 (ref 1–1.03)
TRANSFERRIN: 322 MG/DL (ref 200–360)
URINE TYPE: ABNORMAL
UROBILINOGEN, URINE: 0.2 E.U./DL
WBC # BLD: 6.3 K/UL (ref 4–11)
WBC UA: NORMAL /HPF (ref 0–5)

## 2023-01-19 PROCEDURE — 87086 URINE CULTURE/COLONY COUNT: CPT

## 2023-01-19 PROCEDURE — 85730 THROMBOPLASTIN TIME PARTIAL: CPT

## 2023-01-19 PROCEDURE — 82040 ASSAY OF SERUM ALBUMIN: CPT

## 2023-01-19 PROCEDURE — 73700 CT LOWER EXTREMITY W/O DYE: CPT

## 2023-01-19 PROCEDURE — 84466 ASSAY OF TRANSFERRIN: CPT

## 2023-01-19 PROCEDURE — 85025 COMPLETE CBC W/AUTO DIFF WBC: CPT

## 2023-01-19 PROCEDURE — 36415 COLL VENOUS BLD VENIPUNCTURE: CPT

## 2023-01-19 PROCEDURE — 85610 PROTHROMBIN TIME: CPT

## 2023-01-19 PROCEDURE — 86850 RBC ANTIBODY SCREEN: CPT

## 2023-01-19 PROCEDURE — 81001 URINALYSIS AUTO W/SCOPE: CPT

## 2023-01-19 PROCEDURE — 86901 BLOOD TYPING SEROLOGIC RH(D): CPT

## 2023-01-19 PROCEDURE — 83036 HEMOGLOBIN GLYCOSYLATED A1C: CPT

## 2023-01-19 PROCEDURE — 86900 BLOOD TYPING SEROLOGIC ABO: CPT

## 2023-01-19 PROCEDURE — 87641 MR-STAPH DNA AMP PROBE: CPT

## 2023-01-19 PROCEDURE — 80048 BASIC METABOLIC PNL TOTAL CA: CPT

## 2023-01-20 ENCOUNTER — HOSPITAL ENCOUNTER (OUTPATIENT)
Dept: PHYSICAL THERAPY | Age: 68
Setting detail: THERAPIES SERIES
Discharge: HOME | End: 2023-01-20
Payer: MEDICARE

## 2023-01-20 LAB
ESTIMATED AVERAGE GLUCOSE: 119.8 MG/DL
HBA1C MFR BLD: 5.8 %
MRSA SCREEN RT-PCR: NORMAL
URINE CULTURE, ROUTINE: NORMAL

## 2023-01-20 PROCEDURE — 97110 THERAPEUTIC EXERCISES: CPT | Performed by: PHYSICAL THERAPIST

## 2023-01-20 PROCEDURE — 97161 PT EVAL LOW COMPLEX 20 MIN: CPT | Performed by: PHYSICAL THERAPIST

## 2023-01-20 NOTE — PLAN OF CARE
[place] [address] [facility], [address]  Phone: [phone]  Fax [phone]     [facility]    Dear  Dr. Jennifer Edwards,    We had the pleasure of evaluating the following patient for physical therapy services at [address] [place] [address]. A summary of our findings can be found in the initial assessment below. This includes our plan of care. If you have any questions or concerns regarding these findings, please do not hesitate to contact me at the office phone number checked above. Thank you for the referral.       Physician Signature:_______________________________Date:__________________  By signing above (or electronic signature), therapists plan is approved by physician    Patient: Liz Osborne   : 1955   MRN: [id number]  Referring Physician:  Jennifer Edwards      Evaluation Date: 2023      Medical Diagnosis Information:  Diagnosis: M17.11 (ICD-10-CM) - Primary osteoarthritis of right knee   Treatment Diagnosis: right knee pain M25.561                                         Insurance information: PT Insurance Information: / AARP     Precautions/ Contra-indications: OA   Latex Allergy:  [x]NO      []YES  Preferred Language for Healthcare:   [x]English       []other:    SUBJECTIVE: Patient stated complaint:  Pt has had worsening pain over the years. No previous surgeries. She has had viscosup and cortizone. Pt does not trust right knee on stairs. Pt fears that right knee will give out. Left knee feels stiff. Relevant Medical History:left knee replacement  22  Functional Disability Index: LEFS  49%    Height: 5'4\" Weight: 180  Pain Scale: 2-8/10  Easing factors: rest  Provocative factors: standing, walking,kneeling, squatting, stairs     Type: []Constant   []Intermittent  []Radiating []Localized []other:     Numbness/Tingling: []    Occupation/School:retired.      Living Status/Prior Level of Function: Independent with ADLs and IADLs, travel. OBJECTIVE:        Reflexes/Myotomes:   [x]Dermatomes/Myotomes intact    []Reflexes equal and normal bilaterally   []Other:    Joint mobility:    []Normal    [x]Hypo   []Hyper    Functional Mobility/Transfers: Indep    Posture: flexed                        [x] Patient history, allergies, meds reviewed. Medical chart reviewed. See intake form. Review Of Systems (ROS):  [x]Performed Review of systems (Integumentary, CardioPulmonary, Neurological) by intake and observation. Intake form has been scanned into medical record. Patient has been instructed to contact their primary care physician regarding ROS issues if not already being addressed at this time.       Co-morbidities/Complexities (which will affect course of rehabilitation):   []None           Arthritic conditions   []Rheumatoid arthritis (M05.9)  [x]Osteoarthritis (M19.91)   Cardiovascular conditions   []Hypertension (I10)  []Hyperlipidemia (E78.5)  []Angina pectoris (I20)  []Atherosclerosis (I70)   Musculoskeletal conditions   []Disc pathology   []Congenital spine pathologies   []Prior surgical intervention  []Osteoporosis (M81.8)  []Osteopenia (M85.8)   Endocrine conditions   []Hypothyroid (E03.9)  []Hyperthyroid Gastrointestinal conditions   []Constipation (F46.34)   Metabolic conditions   []Morbid obesity (E66.01)  []Diabetes type 1(E10.65) or 2 (E11.65)   []Neuropathy (G60.9)     Pulmonary conditions   []Asthma (J45)  []Coughing   []COPD (J44.9)   Psychological Disorders  []Anxiety (F41.9)  []Depression (F32.9)   []Other:   []Other:          Barriers to/and or personal factors that will affect rehab potential:              []Age  []Sex              []Motivation/Lack of Motivation                        []Co-Morbidities              []Cognitive Function, education/learning barriers              []Environmental, home barriers              []profession/work barriers  []past PT/medical experience  []other:  Justification: should progress well with surgery. Falls Risk Assessment (30 days):   [x] Falls Risk assessed and no intervention required. [] Falls Risk assessed and Patient requires intervention due to being higher risk   TUG score (>12s at risk):     [] Falls education provided, including         ASSESSMENT:   Functional Impairments:     [x]Noted lumbar/proximal hip/LE joint hypomobility   [x]Decreased LE functional ROM   [x]Decreased core/proximal hip strength and neuromuscular control   [x]Decreased LE functional strength   [x]Reduced balance/proprioceptive control   []other:      Functional Activity Limitations (from functional questionnaire and intake)   [x]Reduced ability to tolerate prolonged functional positions   [x]Reduced ability or difficulty with changes of positions or transfers between positions   [x]Reduced ability to maintain good posture and demonstrate good body mechanics with sitting, bending, and lifting   [x]Reduced ability to sleep   [] Reduced ability or tolerance with driving and/or computer work   [x]Reduced ability to perform lifting, carrying tasks   [x]Reduced ability to squat   []Reduced ability to forward bend   [x]Reduced ability to ambulate prolonged functional periods/distances/surfaces   [x]Reduced ability to ascend/descend stairs   []Reduced ability to run, hop, cut or jump   []other:    Participation Restrictions   []Reduced participation in self care activities   [x]Reduced participation in home management activities   []Reduced participation in work activities   [x]Reduced participation in social activities. []Reduced participation in sport/recreation activities. Classification :    []Signs/symptoms consistent with post-surgical status including decreased ROM, strength and function.    []Signs/symptoms consistent with joint sprain/strain   []Signs/symptoms consistent with patella-femoral syndrome   [x]Signs/symptoms consistent with knee OA/hip OA   []Signs/symptoms consistent with internal derangement of knee/Hip   []Signs/symptoms consistent with functional hip weakness/NMR control      []Signs/symptoms consistent with tendinitis/tendinosis    []signs/symptoms consistent with pathology which may benefit from Dry needling      []other:      Prognosis/Rehab Potential:      []Excellent   [x]Good    []Fair   []Poor    Tolerance of evaluation/treatment:    []Excellent   [x]Good    []Fair   []Poor  Physical Therapy Evaluation Complexity Justification  [x] A history of present problem with:  [] no personal factors and/or comorbidities that impact the plan of care;  [x]1-2 personal factors and/or comorbidities that impact the plan of care  []3 personal factors and/or comorbidities that impact the plan of care  [x] An examination of body systems using standardized tests and measures addressing any of the following: body structures and functions (impairments), activity limitations, and/or participation restrictions;:  [] a total of 1-2 or more elements   [x] a total of 3 or more elements   [] a total of 4 or more elements   [x] A clinical presentation with:  [x] stable and/or uncomplicated characteristics   [] evolving clinical presentation with changing characteristics  [] unstable and unpredictable characteristics;   [x] Clinical decision making of [x] low, [] moderate, [] high complexity using standardized patient assessment instrument and/or measurable assessment of functional outcome.     [x] EVAL (LOW) 92769 (typically 20 minutes face-to-face)  [] EVAL (MOD) 22273 (typically 30 minutes face-to-face)  [] EVAL (HIGH) 12207 (typically 45 minutes face-to-face)  [] RE-EVAL       PLAN:   Frequency/Duration:  1 PreHab visit  Interventions:  [x]  Therapeutic exercise including: strength training, ROM, for Lower extremity and core   []  NMR activation and proprioception for LE, Glutes and Core   []  Manual therapy as indicated for LE, Hip and spine to include: Dry Needling/IASTM, STM, PROM, Gr I-IV mobilizations, manipulation. [] Modalities as needed that may include: thermal agents, E-stim, Biofeedback, US, iontophoresis as indicated  [x] Patient education on joint protection, postural re-education, activity modification, progression of HEP. [x] Patient appears to be functionally prepared for surgery and will continue with a home exercise program until surgery date. [] Patient will be ready for surgery with a short period of structured physical therapy  [] Patient does not appear to be functionally ready for surgery and requires a prolonged  structure program    At this point, it appears patient's post-operative discharge status from hospital should be:   [x] Home with home exercise program and outpatient PT  [] Home with home health care and   [] Skilled nursing facility/ Inpatient Rehab    HEP instruction: [facility]    GOALS:  Patient stated goal: walking normal.   Climb stairs normally    Therapist goals for Patient:   Short Term Goals: To be achieved in: 1  weeks  1. Independent in HEP and progression per patient tolerance, in order to prevent re-injury. [] Progressing: [] Met: [] Not Met: [] Adjusted  2. Patient will have a decrease in pain to facilitate improvement in movement, function, and ADLs as indicated by Functional Deficits.   [] Progressing: [] Met: [] Not Met: [] Adjusted      Electronically signed by:  Ivana Thompson, PT

## 2023-01-20 NOTE — FLOWSHEET NOTE
[place] [facility], [address]  Phone: [phone]  Fax [phone]    Physical Therapy Daily Treatment Note  Date:  2023    Patient Name:  Comfort Harris    :  1955  MRN: [id number]  Restrictions/Precautions:    Medical/Treatment Diagnosis Information:  Diagnosis: M17.11 (ICD-10-CM) - Primary osteoarthritis of right knee  Treatment Diagnosis: right knee pain R45.980  Insurance/Certification information:  [facility]  Physician Information:   [facility] [address] [place] of care signed (Y/N):     Date of Patient follow up with Physician:       Progress Note: [x]  Yes  []  No  Next due by: Visit #10       Latex Allergy:  [x]NO      []YES  Preferred Language for Healthcare:   [x]English       []other:       Visit # Insurance Allowable Auth Required   In-person 1  []  Yes []  No    Telehealth   []  Yes []  No    Total        Pain level:  2-8/10     SUBJECTIVE:  See eval    OBJECTIVE: See eval  Observation:   Test measurements:      RESTRICTIONS/PRECAUTIONS: OA    Exercises/Interventions:     Therapeutic Ex Sets/reps Notes        Seated HS stretch 3x30 sec HEP   Seated calf stretch 3x30 sec HEP   Seated QS BLEs 10 sec 10x HEP   SLR FLX supine 1x10 HEP   Seated heelslide with strap 10 sec 10x HEP   LAQ  1x10 HEP   minisquats 1x10 HEP                                           Manual Intervention                                   NMR re-education                                                      Therapeutic Exercise and NMR EXR  [x] (36716) Provided verbal/tactile cueing for activities related to strengthening, flexibility, endurance, ROM for improvements in LE, proximal hip, and core control with self care, mobility, lifting, ambulation.  [] (57943) Provided verbal/tactile cueing for activities related to improving balance, coordination, kinesthetic sense, posture, motor skill, proprioception  to assist with LE, proximal hip, and core control in self care, mobility, lifting, ambulation and eccentric single leg control. NMR and Therapeutic Activities:    [] (78663 or 08821) Provided verbal/tactile cueing for activities related to improving balance, coordination, kinesthetic sense, posture, motor skill, proprioception and motor activation to allow for proper function of core, proximal hip and LE with self care and ADLs  [] (52302) Gait Re-education- Provided training and instruction to the patient for proper LE, core and proximal hip recruitment and positioning and eccentric body weight control with ambulation re-education including up and down stairs     Home Exercise Program:    [x] (80674) Reviewed/Progressed HEP activities related to strengthening, flexibility, endurance, ROM of core, proximal hip and LE for functional self-care, mobility, lifting and ambulation/stair navigation   [] (52265)Reviewed/Progressed HEP activities related to improving balance, coordination, kinesthetic sense, posture, motor skill, proprioception of core, proximal hip and LE for self care, mobility, lifting, and ambulation/stair navigation      Manual Treatments:  PROM / STM / Oscillations-Mobs:  G-I, II, III, IV (PA's, Inf., Post.)  [] (03273) Provided manual therapy to mobilize LE, proximal hip and/or LS spine soft tissue/joints for the purpose of modulating pain, promoting relaxation,  increasing ROM, reducing/eliminating soft tissue swelling/inflammation/restriction, improving soft tissue extensibility and allowing for proper ROM for normal function with self care, mobility, lifting and ambulation.      Modalities:      Charges:  Timed Code Treatment Minutes: 20   Total Treatment Minutes: 45     [x] EVAL (LOW) 16539 (typically 20 minutes face-to-face)  [] EVAL (MOD) 02812 (typically 30 minutes face-to-face)  [] EVAL (HIGH) 59853 (typically 45 minutes face-to-face)  [] RE-EVAL     [x] TA(71664) x  1   [] IONTO  [] NMR (92927) x [] VASO  [] Manual (63726) x      [] Other:  [] TA x      [] Mech Traction (63830)  [] ES(attended) (55110)      [] ES (un) (63495):     GOALS:   Patient stated goal: To be prepared for surgery      Therapist goals for Patient:    Short Term Goals: To be achieved in: 1 weeks  1. Independent in HEP and progression per patient tolerance, in order to prevent re-injury and to prepare for surgery by strength and flexibility therex . Progression Towards Functional goals:  [] Patient is progressing as expected towards functional goals listed. [] Progression is slowed due to complexities listed. [] Progression has been slowed due to co-morbidities.   [x] Plan just implemented, too soon to assess goals progression  [] Other:     ASSESSMENT:  See eval    Treatment/Activity Tolerance:  [] Patient tolerated treatment well [] Patient limited by fatique  [] Patient limited by pain  [] Patient limited by other medical complications  [] Other:   [x] Patient did not tolerate physical therapy activity due to substantial increase in pain    Prognosis: [] Good [] Fair  [] Poor          Patient Requires Follow-up: [] Yes  [] No    PLAN: See eval       [] Continue per plan of care  [] Alter current plan (see comments)  [] Plan of care initiated [] Discharge    [x] Discharge to home program at this time due to inability to tolerate physical therapy activity       Electronically signed by: Davi Lee PT, PT

## 2023-01-26 RX ORDER — MELOXICAM 15 MG/1
TABLET ORAL
Qty: 30 TABLET | Refills: 0 | Status: SHIPPED | OUTPATIENT
Start: 2023-01-26

## 2023-02-16 ENCOUNTER — OFFICE VISIT (OUTPATIENT)
Dept: FAMILY MEDICINE CLINIC | Age: 68
End: 2023-02-16
Payer: MEDICARE

## 2023-02-16 VITALS
HEIGHT: 64 IN | HEART RATE: 80 BPM | BODY MASS INDEX: 31.41 KG/M2 | SYSTOLIC BLOOD PRESSURE: 136 MMHG | WEIGHT: 184 LBS | RESPIRATION RATE: 16 BRPM | DIASTOLIC BLOOD PRESSURE: 74 MMHG | OXYGEN SATURATION: 99 %

## 2023-02-16 DIAGNOSIS — Z01.818 PREOP EXAMINATION: Primary | ICD-10-CM

## 2023-02-16 PROCEDURE — G8417 CALC BMI ABV UP PARAM F/U: HCPCS | Performed by: REGISTERED NURSE

## 2023-02-16 PROCEDURE — G8399 PT W/DXA RESULTS DOCUMENT: HCPCS | Performed by: REGISTERED NURSE

## 2023-02-16 PROCEDURE — 1123F ACP DISCUSS/DSCN MKR DOCD: CPT | Performed by: REGISTERED NURSE

## 2023-02-16 PROCEDURE — 99214 OFFICE O/P EST MOD 30 MIN: CPT | Performed by: REGISTERED NURSE

## 2023-02-16 PROCEDURE — G8484 FLU IMMUNIZE NO ADMIN: HCPCS | Performed by: REGISTERED NURSE

## 2023-02-16 PROCEDURE — G8427 DOCREV CUR MEDS BY ELIG CLIN: HCPCS | Performed by: REGISTERED NURSE

## 2023-02-16 PROCEDURE — 1090F PRES/ABSN URINE INCON ASSESS: CPT | Performed by: REGISTERED NURSE

## 2023-02-16 PROCEDURE — 3017F COLORECTAL CA SCREEN DOC REV: CPT | Performed by: REGISTERED NURSE

## 2023-02-16 PROCEDURE — 93000 ELECTROCARDIOGRAM COMPLETE: CPT | Performed by: REGISTERED NURSE

## 2023-02-16 PROCEDURE — 1036F TOBACCO NON-USER: CPT | Performed by: REGISTERED NURSE

## 2023-02-16 ASSESSMENT — PATIENT HEALTH QUESTIONNAIRE - PHQ9
SUM OF ALL RESPONSES TO PHQ QUESTIONS 1-9: 0
2. FEELING DOWN, DEPRESSED OR HOPELESS: 0
SUM OF ALL RESPONSES TO PHQ QUESTIONS 1-9: 0
1. LITTLE INTEREST OR PLEASURE IN DOING THINGS: 0
SUM OF ALL RESPONSES TO PHQ QUESTIONS 1-9: 0
SUM OF ALL RESPONSES TO PHQ QUESTIONS 1-9: 0
SUM OF ALL RESPONSES TO PHQ9 QUESTIONS 1 & 2: 0

## 2023-02-16 NOTE — PROGRESS NOTES
[facility]  [phone]  Fax: [phone]   Pre-operative History and Physical      DIAGNOSIS:  Right knee osteoarthritis     PROCEDURE:  RIGHT TOTAL KNEE ARTHROPLASTY                     History Obtained From:  patient    HISTORY OF PRESENT ILLNESS:    The patient is a 67 y.o. female with significant past medical history of vitamin d deficiency, hypercholesterolemia, osteoarthritis, TKR- left. I am seeing this patient for preop consultation for Dr. Luis Enrique Orr.       Past Medical History:   Diagnosis Date    Hyperlipidemia     Menopause     Menopause     OA (osteoarthritis)      (spontaneous vaginal delivery)          Past Surgical History:   Procedure Laterality Date    COLONOSCOPY  02/15/2001, 2006, 10/1/12    TOTAL KNEE ARTHROPLASTY Left 2022    LEFT TOTAL KNEE ARTHROPLASTY WITH ADDUCTOR CANAL BLOCK FOR PAIN CONTROL                  MEDACTA performed by Luis Enrique Orr MD at [facility] OR    WISDOM TOOTH EXTRACTION       Current Outpatient Medications   Medication Sig Dispense Refill    meloxicam (MOBIC) 15 MG tablet TAKE 1 TABLET BY MOUTH EVERY DAY 30 tablet 0    Multiple Vitamins-Minerals (THERAPEUTIC MULTIVITAMIN-MINERALS) tablet Take 1 tablet by mouth daily      vitamin D (CHOLECALCIFEROL) 400 UNITS TABS tablet Take 1,000 Units by mouth daily       mupirocin (BACTROBAN) 2 % ointment Apply twice daily to each nare for 5 days prior to surgical procedure (Patient not taking: Reported on 2023) 1 g 0     No current facility-administered medications for this visit.       Allergies:  Penicillins  History of allergic reaction to anesthesia:  No     Social History     Tobacco Use   Smoking Status Never   Smokeless Tobacco Never     The patient states she drinks 0 per week.    Family History   Problem Relation Age of Onset    Cancer Mother         colon cancer/cervical cancer    Cataracts Mother     High Cholesterol Father     Cataracts Father     Thyroid Disease Sister     Cancer Brother        anal cancer    Thyroid Disease Niece     Breast Cancer Niece 52       REVIEW OF SYSTEMS:    CONSTITUTIONAL:  negative  EYES:  positive for glasses  HEENT:  negative  RESPIRATORY:  negative for  dry cough, cough with sputum, and dyspnea  CARDIOVASCULAR:  negative for  chest pain, dyspnea, palpitations, fatigue, edema  GASTROINTESTINAL:  negative  GENITOURINARY:  negative  INTEGUMENT/BREAST:  negative  HEMATOLOGIC/LYMPHATIC:  negative  ALLERGIC/IMMUNOLOGIC:  negative  ENDOCRINE:  negative  MUSCULOSKELETAL:  positive for  right knee pain  NEUROLOGICAL:  negative    PHYSICAL EXAM:      /74 (Site: Left Upper Arm, Position: Sitting, Cuff Size: Large Adult)   Pulse 80   Resp 16   Ht 5' 4\" (1.626 m)   Wt 184 lb (83.5 kg)   LMP  (LMP Unknown)   SpO2 99%   BMI 31.58 kg/m²     CONSTITUTIONAL:  awake, alert, cooperative, no apparent distress, and appears stated age    Eyes:  Lids and lashes normal, pupils equal, round and reactive to light, extra ocular muscles intact, sclera clear, conjunctiva normal    Head/ENT:  Normocephalic, without obvious abnormality, atramatic, sinuses nontender on palpation, external ears without lesions, oral pharynx with moist mucus membranes, tonsils without erythema or exudates, gums normal and good dentition. Neck:  Supple, symmetrical, trachea midline, no adenopathy, thyroid symmetric, not enlarged and no tenderness, skin normal    Heart:  Normal apical impulse, regular rate and rhythm, normal S1 and S2, no S3 or S4, and no murmur noted    Lungs:  No increased work of breathing, good air exchange, clear to auscultation bilaterally, no crackles or wheezing    Abdomen:  No scars, normal bowel sounds, soft, non-distended, non-tender, no masses palpated, no hepatosplenomegally    Extremities:  No clubbing, cyanosis, or edema    NEUROLOGIC:  Awake, alert, oriented to name, place and time. Cranial nerves II-XII are grossly intact. Motor is 5 out of 5 bilaterally.         DATA:  EKG: Date:  2/16/23  I have reviewed EKG with the following interpretation:  Impression:  Reviewed with Dr. Juan Jose Pierce. Sinus rhythm, poor r-wave progression anteriorly, voltage criteria for LVH, otherwise normal.    Labs reviewed- these were completed per Dr. Jose Meadows orders on 1/19/23. ASSESSMENT AND PLAN:    1. Patient is a 78 y.o. female with above specified procedure planned on 3/6/23 with Dr. Angella Chawla at [facility].  They will not require cardiology evaluation prior to procedure. 2. Stop ASA/NSAIDs medications 7-10 days prior to procedure. 3.Patient is cleared for surgery  4. Preop has been faxed to Dr. Jose Meadows office    [facility] [address] [place] [address], [place] [address]  [phone]

## 2023-02-21 ENCOUNTER — TELEPHONE (OUTPATIENT)
Dept: ORTHOPEDIC SURGERY | Age: 68
End: 2023-02-21

## 2023-02-24 RX ORDER — MELOXICAM 15 MG/1
TABLET ORAL
Qty: 30 TABLET | Refills: 0 | Status: SHIPPED | OUTPATIENT
Start: 2023-02-24

## 2023-02-27 ENCOUNTER — TELEPHONE (OUTPATIENT)
Dept: ORTHOPEDIC SURGERY | Age: 68
End: 2023-02-27

## 2023-02-27 NOTE — TELEPHONE ENCOUNTER
ORTHOPAEDIC NURSE NAVIGATOR SUMMARY NOTE      Anticipated Date of Surgery: 3/6/23    Recieved Pre-Op Education: yes   In person class:No  Pt used educational link:Yes   Pt completed pre and post test to measure learning:Yes    If pt did not complete either, why not? N/A  ERAS protocol explained to pt. Pt does not have the following medical conditions:  -Diabetes  -Gastroparesis  -CHF  -Fluid restricted diet  -Known difficult airway  Pt instructed to drink up to 40 oz of Gatorade type drink the evening prior to surgery. Pt informed they can have up to 40 oz of water and that it must be completed 2 hours prior to scheduled surgery. Pt verbalized understanding. PCP: ADITI Srinivasan CNP   Phone #: [phone]    Date of PCP Visit for H&P: 2/16/23    Any Noted Concerns from PCP prior to surgery:  No   If Yes, what concerns?:    IS THE PATIENT IN A PAIN MANAGEMENT PROGRAM?:   No     Review of Past Medical History Reveals History of:      Critical Lab Values:   Hgb/Hct:   Hemoglobin (g/dL)   Date Value   01/19/2023 12.5   /  Hematocrit (%)   Date Value   01/19/2023 38.7      HgbA1C:    Lab Results   Component Value Date    LABA1C 5.8 01/19/2023    LABA1C 5.7 07/18/2022    LABA1C 5.9 08/09/2021      Albumin:    Lab Results   Component Value Date    LABALBU 4.5 01/19/2023      BUN/Cr:   BUN (mg/dL)   Date Value   01/19/2023 13   /  Creatinine (mg/dL)   Date Value   01/19/2023 0.6      BMI:    BMI Readings from Last 1 Encounters:   02/16/23 31.58 kg/m²        Coronary Artery Disease/HTN/CHF History: No      Cardiologist:     Cardiac Clearance Necessary: No    Date of Cardiac Clearance Appt: On Plavix? No,  If YES, when will they stop taking?     Final Cardiac Recommendations:N/A   -On any anticoagulation-None       Diabetes History: No    Most Recent HgbA1C: N/A    PCP or Endocrine Recommendations: N/A    Nutritionist/Dietician Consult Scheduled: N/A    Final Plan For Diabetic Control: N/A   Pulmonary: COPD/Emphysema/ Use of home oxygen: none     Alcohol use:        DVT Risk Stratification:  Low      Vascular Consult Ordered:  NA    Date of Vascular Appt:     Hematology/Oncology Consult Ordered:  NA    Date of Hematology/Oncology Appt:     Final Recommendation For DVT Prophylaxis:   -Smoking history or use of estrogen-         BMI Greater than 40 at time of scheduling?: No    Has Surgeon been notified of BMI concern? Not Applicable    Weight Loss Clinic Consult Ordered: Not Applicable    Date of Wt Loss Clinic Appt:     BMI at time of surgery (if went through [facility]): Additional Medical Concerns:         Discharge Disposition Information:     Attended Pre-Hab Program: yes     Anticipated Discharge Disposition:  Home with OP PT    Who will be with patient at home following discharge?    and daughter      Equipment pt already has:  walkers, cane, shower bench, ice machine   Bedroom on first or second floor: first   Bathroom on first or second floor: first   Weight bearing status: full   Pre-op ambulatory status: none   Number of entry steps: zero   Caregiver assistance: full time    Pt plan to DC same day: [id number] [address] to    [facility] AT [facility] preference: OP PT Marilyn Pacheco, RN  2/27/2023

## 2023-02-28 ENCOUNTER — ANESTHESIA EVENT (OUTPATIENT)
Dept: OPERATING ROOM | Age: 68
End: 2023-02-28
Payer: MEDICARE

## 2023-03-01 NOTE — PROGRESS NOTES
1. Do not eat or drink anything after 12 midnight prior to surgery. This includes no water, chewing gum mints, or ice chips. You may brush your teeth and gargle the day of surgery but DO NOT SWALLOW THE WATER. 2. Please see your family doctor/pediatrician for a history and physical and/or concerning medications. Bring any test results/reports from your physician's office. If you are under the care of a heart doctor or specialist please be aware that you may be asked to see him or her for clearance. 3. You may be asked to stop blood thinners such as Coumadin, Plavix, Fragmin, and Lovenox or Anti-inflammatories such as Aspirin, Ibuprofen, Advil, and Naproxen prior to your surgery. Please check with your doctor before stopping these or any other medications. 4. Do not smoke, and do not drink any alcoholic beverages 24 hours prior to surgery. 5. You MUST make arrangements for a responsible adult to take you home after your surgery. For your safety, you will not be allowed to leave alone or drive yourself home. Your surgery will be cancelled if you do not have a ride home. Also for your safety, it is strongly suggested someone stay with you the first 24 hrs after your surgery. 6. A parent/legal guardian must accompany a child scheduled for surgery and plan to stay at the hospital until the child is discharged. Please do not bring other children with you. 7. For your comfort,please wear simple, loose fitting clothing to the hospital.  Please do not bring valuables (money, credit cards, checkbooks, etc.) Do not wear any makeup (including no eye makeup) or nail polish on your fingers or toes. 8. For your safety, please DO NOT wear any jewelry or piercings on day of surgery. All body piercing jewelry must be removed. 9. If you have dentures, they will be removed before going to the OR; for your convenience we will provide you with a container.   If you wear contact lenses or glasses, they will be removed, they will be removed, please bring a case for them. 10. If appicable,Please see your family doctor/pediatrician for a history & physical and/or concerning medications. Bring any test results/reports from your physician's office. 11. Remember to bring Blood Bank bracelet to the hospital on the day of surgery. 12. If you have a Living Will and Durable Power of  for Healthcare, please bring in a copy. 15. Notify your Surgeon if you develop any illness between now and surgery  time, cough, cold, fever, sore throat, nausea, vomiting, etc.  Please notify your surgeon if you experience dizziness, shortness of breath or blurred vision between now & the time of your surgery   14. DO NOT shave your operative site 96 hours prior to surgery. For face & neck surgery, men may use an electric razor 48 hours prior to surgery. 15. Shower the night before surgery with _X__Antibacterial soap _X__Hibiclens   16. To provide excellent care visitors will be limited to one in the room at any given time. 17.  Please bring picture ID and insurance card. 18.  Visit our web site for additional information:  [employer]. [facility]/surgery.

## 2023-03-02 ENCOUNTER — TELEPHONE (OUTPATIENT)
Dept: ORTHOPEDIC SURGERY | Age: 68
End: 2023-03-02

## 2023-03-06 ENCOUNTER — APPOINTMENT (OUTPATIENT)
Dept: GENERAL RADIOLOGY | Age: 68
End: 2023-03-06
Attending: ORTHOPAEDIC SURGERY
Payer: MEDICARE

## 2023-03-06 ENCOUNTER — HOSPITAL ENCOUNTER (OUTPATIENT)
Age: 68
Discharge: HOME | End: 2023-03-06
Attending: ORTHOPAEDIC SURGERY | Admitting: ORTHOPAEDIC SURGERY
Payer: MEDICARE

## 2023-03-06 ENCOUNTER — ANESTHESIA (OUTPATIENT)
Dept: OPERATING ROOM | Age: 68
End: 2023-03-06
Payer: MEDICARE

## 2023-03-06 VITALS
TEMPERATURE: 97.2 F | RESPIRATION RATE: 16 BRPM | OXYGEN SATURATION: 98 % | BODY MASS INDEX: 31.58 KG/M2 | HEART RATE: 63 BPM | SYSTOLIC BLOOD PRESSURE: 134 MMHG | DIASTOLIC BLOOD PRESSURE: 77 MMHG | WEIGHT: 184 LBS

## 2023-03-06 DIAGNOSIS — Z96.651 S/P TOTAL KNEE ARTHROPLASTY, RIGHT: Primary | ICD-10-CM

## 2023-03-06 DIAGNOSIS — Z96.652 S/P TOTAL KNEE ARTHROPLASTY, LEFT: Primary | ICD-10-CM

## 2023-03-06 DIAGNOSIS — M17.12 LOCALIZED OSTEOARTHRITIS OF LEFT KNEE: Primary | ICD-10-CM

## 2023-03-06 PROBLEM — M17.11 LOCALIZED OSTEOARTHRITIS OF RIGHT KNEE: Status: ACTIVE | Noted: 2023-03-06

## 2023-03-06 LAB
ABO/RH: NORMAL
ANTIBODY SCREEN: NORMAL
GLUCOSE BLD-MCNC: 102 MG/DL (ref 70–99)
PERFORMED ON: ABNORMAL

## 2023-03-06 PROCEDURE — 2580000003 HC RX 258: Performed by: PHYSICIAN ASSISTANT

## 2023-03-06 PROCEDURE — 2500000003 HC RX 250 WO HCPCS: Performed by: ANESTHESIOLOGY

## 2023-03-06 PROCEDURE — 6370000000 HC RX 637 (ALT 250 FOR IP): Performed by: ANESTHESIOLOGY

## 2023-03-06 PROCEDURE — 6360000002 HC RX W HCPCS: Performed by: ANESTHESIOLOGY

## 2023-03-06 PROCEDURE — 2500000003 HC RX 250 WO HCPCS: Performed by: PHYSICIAN ASSISTANT

## 2023-03-06 PROCEDURE — 97110 THERAPEUTIC EXERCISES: CPT

## 2023-03-06 PROCEDURE — 86901 BLOOD TYPING SEROLOGIC RH(D): CPT

## 2023-03-06 PROCEDURE — 2500000003 HC RX 250 WO HCPCS: Performed by: ORTHOPAEDIC SURGERY

## 2023-03-06 PROCEDURE — 6360000002 HC RX W HCPCS: Performed by: NURSE ANESTHETIST, CERTIFIED REGISTERED

## 2023-03-06 PROCEDURE — 3600000014 HC SURGERY LEVEL 4 ADDTL 15MIN: Performed by: ORTHOPAEDIC SURGERY

## 2023-03-06 PROCEDURE — 2720000010 HC SURG SUPPLY STERILE: Performed by: ORTHOPAEDIC SURGERY

## 2023-03-06 PROCEDURE — 2709999900 HC NON-CHARGEABLE SUPPLY: Performed by: ORTHOPAEDIC SURGERY

## 2023-03-06 PROCEDURE — 2500000003 HC RX 250 WO HCPCS: Performed by: NURSE ANESTHETIST, CERTIFIED REGISTERED

## 2023-03-06 PROCEDURE — C1776 JOINT DEVICE (IMPLANTABLE): HCPCS | Performed by: ORTHOPAEDIC SURGERY

## 2023-03-06 PROCEDURE — 97530 THERAPEUTIC ACTIVITIES: CPT

## 2023-03-06 PROCEDURE — 86850 RBC ANTIBODY SCREEN: CPT

## 2023-03-06 PROCEDURE — 97161 PT EVAL LOW COMPLEX 20 MIN: CPT

## 2023-03-06 PROCEDURE — 3600000004 HC SURGERY LEVEL 4 BASE: Performed by: ORTHOPAEDIC SURGERY

## 2023-03-06 PROCEDURE — 97116 GAIT TRAINING THERAPY: CPT

## 2023-03-06 PROCEDURE — 3700000001 HC ADD 15 MINUTES (ANESTHESIA): Performed by: ORTHOPAEDIC SURGERY

## 2023-03-06 PROCEDURE — 2580000003 HC RX 258: Performed by: NURSE ANESTHETIST, CERTIFIED REGISTERED

## 2023-03-06 PROCEDURE — 2580000003 HC RX 258: Performed by: ORTHOPAEDIC SURGERY

## 2023-03-06 PROCEDURE — 3700000000 HC ANESTHESIA ATTENDED CARE: Performed by: ORTHOPAEDIC SURGERY

## 2023-03-06 PROCEDURE — 7100000011 HC PHASE II RECOVERY - ADDTL 15 MIN: Performed by: ORTHOPAEDIC SURGERY

## 2023-03-06 PROCEDURE — 64447 NJX AA&/STRD FEMORAL NRV IMG: CPT | Performed by: ANESTHESIOLOGY

## 2023-03-06 PROCEDURE — 6360000002 HC RX W HCPCS

## 2023-03-06 PROCEDURE — 27447 TOTAL KNEE ARTHROPLASTY: CPT | Performed by: ORTHOPAEDIC SURGERY

## 2023-03-06 PROCEDURE — 6360000002 HC RX W HCPCS: Performed by: PHYSICIAN ASSISTANT

## 2023-03-06 PROCEDURE — 86900 BLOOD TYPING SEROLOGIC ABO: CPT

## 2023-03-06 PROCEDURE — C2626 INFUSION PUMP, NON-PROG,TEMP: HCPCS | Performed by: ORTHOPAEDIC SURGERY

## 2023-03-06 PROCEDURE — C1713 ANCHOR/SCREW BN/BN,TIS/BN: HCPCS | Performed by: ORTHOPAEDIC SURGERY

## 2023-03-06 PROCEDURE — 6360000002 HC RX W HCPCS: Performed by: ORTHOPAEDIC SURGERY

## 2023-03-06 PROCEDURE — 73560 X-RAY EXAM OF KNEE 1 OR 2: CPT

## 2023-03-06 PROCEDURE — 7100000010 HC PHASE II RECOVERY - FIRST 15 MIN: Performed by: ORTHOPAEDIC SURGERY

## 2023-03-06 DEVICE — FEMUR SPHERE CEMENTED RIGHT, SIZE 3
Type: IMPLANTABLE DEVICE | Site: KNEE | Status: FUNCTIONAL
Brand: GMK SPHERE TOTAL KNEE SYSTEM

## 2023-03-06 DEVICE — RESURFACING PATELLA SIZE 3
Type: IMPLANTABLE DEVICE | Site: KNEE | Status: FUNCTIONAL
Brand: GMK PRIMARY TOTAL KNEE SYSTEM

## 2023-03-06 DEVICE — FIXED TIBIAL TRAY CEMENTED RIGHT, SIZE 3
Type: IMPLANTABLE DEVICE | Site: KNEE | Status: FUNCTIONAL
Brand: GMK PRIMARY TOTAL KNEE SYSTEM

## 2023-03-06 DEVICE — CEMENT BNE 40 GM RADIOPAQUE BA SIMPLEX P: Type: IMPLANTABLE DEVICE | Site: KNEE | Status: FUNCTIONAL

## 2023-03-06 DEVICE — TIBIAL INSERT FIXED SPHERE FLEX #3/10 MM R
Type: IMPLANTABLE DEVICE | Site: KNEE | Status: FUNCTIONAL
Brand: GMK SPHERE TOTAL KNEE SYSTEM

## 2023-03-06 RX ORDER — SODIUM CHLORIDE 0.9 % (FLUSH) 0.9 %
5-40 SYRINGE (ML) INJECTION EVERY 12 HOURS SCHEDULED
Status: DISCONTINUED | OUTPATIENT
Start: 2023-03-06 | End: 2023-03-06 | Stop reason: HOSPADM

## 2023-03-06 RX ORDER — METHYLPREDNISOLONE 4 MG/1
TABLET ORAL
Qty: 1 KIT | Refills: 0 | Status: SHIPPED | OUTPATIENT
Start: 2023-03-06

## 2023-03-06 RX ORDER — OXYCODONE HYDROCHLORIDE 5 MG/1
5 TABLET ORAL EVERY 4 HOURS PRN
Status: CANCELLED | OUTPATIENT
Start: 2023-03-06

## 2023-03-06 RX ORDER — VANCOMYCIN HYDROCHLORIDE 1 G/20ML
INJECTION, POWDER, LYOPHILIZED, FOR SOLUTION INTRAVENOUS PRN
Status: DISCONTINUED | OUTPATIENT
Start: 2023-03-06 | End: 2023-03-06 | Stop reason: ALTCHOICE

## 2023-03-06 RX ORDER — FENTANYL CITRATE 50 UG/ML
INJECTION, SOLUTION INTRAMUSCULAR; INTRAVENOUS
Status: COMPLETED | OUTPATIENT
Start: 2023-03-06 | End: 2023-03-06

## 2023-03-06 RX ORDER — MORPHINE SULFATE 2 MG/ML
2 INJECTION, SOLUTION INTRAMUSCULAR; INTRAVENOUS
Status: CANCELLED | OUTPATIENT
Start: 2023-03-06

## 2023-03-06 RX ORDER — LIDOCAINE HYDROCHLORIDE 10 MG/ML
1 INJECTION, SOLUTION EPIDURAL; INFILTRATION; INTRACAUDAL; PERINEURAL
Status: DISCONTINUED | OUTPATIENT
Start: 2023-03-06 | End: 2023-03-06 | Stop reason: HOSPADM

## 2023-03-06 RX ORDER — SODIUM CHLORIDE 0.9 % (FLUSH) 0.9 %
5-40 SYRINGE (ML) INJECTION PRN
Status: DISCONTINUED | OUTPATIENT
Start: 2023-03-06 | End: 2023-03-06 | Stop reason: HOSPADM

## 2023-03-06 RX ORDER — ONDANSETRON 2 MG/ML
4 INJECTION INTRAMUSCULAR; INTRAVENOUS
Status: DISCONTINUED | OUTPATIENT
Start: 2023-03-06 | End: 2023-03-06 | Stop reason: HOSPADM

## 2023-03-06 RX ORDER — SODIUM CHLORIDE, SODIUM LACTATE, POTASSIUM CHLORIDE, CALCIUM CHLORIDE 600; 310; 30; 20 MG/100ML; MG/100ML; MG/100ML; MG/100ML
INJECTION, SOLUTION INTRAVENOUS CONTINUOUS
Status: DISCONTINUED | OUTPATIENT
Start: 2023-03-06 | End: 2023-03-06 | Stop reason: HOSPADM

## 2023-03-06 RX ORDER — PROPOFOL 10 MG/ML
INJECTION, EMULSION INTRAVENOUS CONTINUOUS PRN
Status: DISCONTINUED | OUTPATIENT
Start: 2023-03-06 | End: 2023-03-06 | Stop reason: SDUPTHER

## 2023-03-06 RX ORDER — CEFAZOLIN SODIUM IN 0.9 % NACL 2 G/100 ML
2000 PLASTIC BAG, INJECTION (ML) INTRAVENOUS
Status: COMPLETED | OUTPATIENT
Start: 2023-03-06 | End: 2023-03-06

## 2023-03-06 RX ORDER — DEXAMETHASONE SODIUM PHOSPHATE 4 MG/ML
INJECTION, SOLUTION INTRA-ARTICULAR; INTRALESIONAL; INTRAMUSCULAR; INTRAVENOUS; SOFT TISSUE PRN
Status: DISCONTINUED | OUTPATIENT
Start: 2023-03-06 | End: 2023-03-06 | Stop reason: SDUPTHER

## 2023-03-06 RX ORDER — SODIUM CHLORIDE 0.9 % (FLUSH) 0.9 %
5-40 SYRINGE (ML) INJECTION PRN
Status: CANCELLED | OUTPATIENT
Start: 2023-03-06

## 2023-03-06 RX ORDER — OXYCODONE HYDROCHLORIDE 5 MG/1
5 TABLET ORAL EVERY 6 HOURS PRN
Qty: 28 TABLET | Refills: 0 | Status: SHIPPED | OUTPATIENT
Start: 2023-03-06 | End: 2023-03-13

## 2023-03-06 RX ORDER — ONDANSETRON 4 MG/1
4 TABLET, FILM COATED ORAL 3 TIMES DAILY PRN
Qty: 15 TABLET | Refills: 0 | Status: SHIPPED | OUTPATIENT
Start: 2023-03-06

## 2023-03-06 RX ORDER — ACETAMINOPHEN 500 MG
1000 TABLET ORAL ONCE
Status: COMPLETED | OUTPATIENT
Start: 2023-03-06 | End: 2023-03-06

## 2023-03-06 RX ORDER — SODIUM CHLORIDE, SODIUM LACTATE, POTASSIUM CHLORIDE, CALCIUM CHLORIDE 600; 310; 30; 20 MG/100ML; MG/100ML; MG/100ML; MG/100ML
INJECTION, SOLUTION INTRAVENOUS CONTINUOUS
Status: CANCELLED | OUTPATIENT
Start: 2023-03-06

## 2023-03-06 RX ORDER — ONDANSETRON 2 MG/ML
INJECTION INTRAMUSCULAR; INTRAVENOUS PRN
Status: DISCONTINUED | OUTPATIENT
Start: 2023-03-06 | End: 2023-03-06 | Stop reason: SDUPTHER

## 2023-03-06 RX ORDER — CEFAZOLIN SODIUM IN 0.9 % NACL 2 G/100 ML
2000 PLASTIC BAG, INJECTION (ML) INTRAVENOUS EVERY 8 HOURS
Status: CANCELLED | OUTPATIENT
Start: 2023-03-06 | End: 2023-03-07

## 2023-03-06 RX ORDER — BUPIVACAINE HYDROCHLORIDE 7.5 MG/ML
INJECTION, SOLUTION INTRASPINAL
Status: COMPLETED | OUTPATIENT
Start: 2023-03-06 | End: 2023-03-06

## 2023-03-06 RX ORDER — SODIUM CHLORIDE, SODIUM LACTATE, POTASSIUM CHLORIDE, CALCIUM CHLORIDE 600; 310; 30; 20 MG/100ML; MG/100ML; MG/100ML; MG/100ML
INJECTION, SOLUTION INTRAVENOUS CONTINUOUS PRN
Status: DISCONTINUED | OUTPATIENT
Start: 2023-03-06 | End: 2023-03-06 | Stop reason: SDUPTHER

## 2023-03-06 RX ORDER — LIDOCAINE HYDROCHLORIDE 20 MG/ML
INJECTION, SOLUTION INFILTRATION; PERINEURAL PRN
Status: DISCONTINUED | OUTPATIENT
Start: 2023-03-06 | End: 2023-03-06 | Stop reason: SDUPTHER

## 2023-03-06 RX ORDER — SULFAMETHOXAZOLE AND TRIMETHOPRIM 800; 160 MG/1; MG/1
1 TABLET ORAL 2 TIMES DAILY
Qty: 2 TABLET | Refills: 0 | Status: SHIPPED | OUTPATIENT
Start: 2023-03-06 | End: 2023-03-07

## 2023-03-06 RX ORDER — POLYETHYLENE GLYCOL 3350 17 G/17G
17 POWDER, FOR SOLUTION ORAL DAILY
Status: CANCELLED | OUTPATIENT
Start: 2023-03-06

## 2023-03-06 RX ORDER — CELECOXIB 100 MG/1
200 CAPSULE ORAL ONCE
Status: COMPLETED | OUTPATIENT
Start: 2023-03-06 | End: 2023-03-06

## 2023-03-06 RX ORDER — SODIUM CHLORIDE 9 MG/ML
INJECTION, SOLUTION INTRAVENOUS PRN
Status: CANCELLED | OUTPATIENT
Start: 2023-03-06

## 2023-03-06 RX ORDER — MORPHINE SULFATE 4 MG/ML
4 INJECTION, SOLUTION INTRAMUSCULAR; INTRAVENOUS
Status: CANCELLED | OUTPATIENT
Start: 2023-03-06

## 2023-03-06 RX ORDER — ACETAMINOPHEN 325 MG/1
650 TABLET ORAL EVERY 6 HOURS
Status: CANCELLED | OUTPATIENT
Start: 2023-03-06

## 2023-03-06 RX ORDER — OXYCODONE HYDROCHLORIDE 5 MG/1
5 TABLET ORAL
Status: DISCONTINUED | OUTPATIENT
Start: 2023-03-06 | End: 2023-03-06 | Stop reason: HOSPADM

## 2023-03-06 RX ORDER — SODIUM CHLORIDE 9 MG/ML
INJECTION, SOLUTION INTRAVENOUS PRN
Status: DISCONTINUED | OUTPATIENT
Start: 2023-03-06 | End: 2023-03-06 | Stop reason: HOSPADM

## 2023-03-06 RX ORDER — PROPOFOL 10 MG/ML
INJECTION, EMULSION INTRAVENOUS PRN
Status: DISCONTINUED | OUTPATIENT
Start: 2023-03-06 | End: 2023-03-06 | Stop reason: SDUPTHER

## 2023-03-06 RX ORDER — BUPIVACAINE HYDROCHLORIDE 5 MG/ML
INJECTION, SOLUTION EPIDURAL; INTRACAUDAL
Status: COMPLETED | OUTPATIENT
Start: 2023-03-06 | End: 2023-03-06

## 2023-03-06 RX ORDER — PHENYLEPHRINE HCL IN 0.9% NACL 1 MG/10 ML
SYRINGE (ML) INTRAVENOUS PRN
Status: DISCONTINUED | OUTPATIENT
Start: 2023-03-06 | End: 2023-03-06 | Stop reason: SDUPTHER

## 2023-03-06 RX ORDER — OXYCODONE HYDROCHLORIDE 5 MG/1
10 TABLET ORAL EVERY 4 HOURS PRN
Status: CANCELLED | OUTPATIENT
Start: 2023-03-06

## 2023-03-06 RX ORDER — SODIUM CHLORIDE 0.9 % (FLUSH) 0.9 %
5-40 SYRINGE (ML) INJECTION EVERY 12 HOURS SCHEDULED
Status: CANCELLED | OUTPATIENT
Start: 2023-03-06

## 2023-03-06 RX ORDER — CYCLOBENZAPRINE HCL 10 MG
10 TABLET ORAL 3 TIMES DAILY PRN
Qty: 30 TABLET | Refills: 0 | Status: SHIPPED | OUTPATIENT
Start: 2023-03-06 | End: 2023-03-16

## 2023-03-06 RX ORDER — MELOXICAM 7.5 MG/1
3.75 TABLET ORAL DAILY
Status: CANCELLED | OUTPATIENT
Start: 2023-03-06 | End: 2023-03-09

## 2023-03-06 RX ORDER — SENNA PLUS 8.6 MG/1
1 TABLET ORAL DAILY PRN
Status: CANCELLED | OUTPATIENT
Start: 2023-03-06

## 2023-03-06 RX ORDER — SCOLOPAMINE TRANSDERMAL SYSTEM 1 MG/1
1 PATCH, EXTENDED RELEASE TRANSDERMAL
Status: DISCONTINUED | OUTPATIENT
Start: 2023-03-06 | End: 2023-03-06 | Stop reason: HOSPADM

## 2023-03-06 RX ORDER — FENTANYL CITRATE 50 UG/ML
INJECTION, SOLUTION INTRAMUSCULAR; INTRAVENOUS PRN
Status: DISCONTINUED | OUTPATIENT
Start: 2023-03-06 | End: 2023-03-06 | Stop reason: SDUPTHER

## 2023-03-06 RX ORDER — ONDANSETRON 8 MG/1
4 TABLET, ORALLY DISINTEGRATING ORAL EVERY 8 HOURS PRN
Status: CANCELLED | OUTPATIENT
Start: 2023-03-06

## 2023-03-06 RX ORDER — MIDAZOLAM HYDROCHLORIDE 1 MG/ML
INJECTION INTRAMUSCULAR; INTRAVENOUS
Status: COMPLETED
Start: 2023-03-06 | End: 2023-03-06

## 2023-03-06 RX ORDER — ASPIRIN 81 MG/1
81 TABLET ORAL 2 TIMES DAILY
Qty: 60 TABLET | Refills: 0 | Status: SHIPPED | OUTPATIENT
Start: 2023-03-07

## 2023-03-06 RX ORDER — MAGNESIUM SULFATE IN WATER 40 MG/ML
2000 INJECTION, SOLUTION INTRAVENOUS ONCE
Status: COMPLETED | OUTPATIENT
Start: 2023-03-06 | End: 2023-03-06

## 2023-03-06 RX ORDER — KETAMINE HCL IN NACL, ISO-OSM 100MG/10ML
SYRINGE (ML) INJECTION PRN
Status: DISCONTINUED | OUTPATIENT
Start: 2023-03-06 | End: 2023-03-06 | Stop reason: SDUPTHER

## 2023-03-06 RX ORDER — ONDANSETRON 2 MG/ML
4 INJECTION INTRAMUSCULAR; INTRAVENOUS EVERY 6 HOURS PRN
Status: CANCELLED | OUTPATIENT
Start: 2023-03-06

## 2023-03-06 RX ADMIN — ONDANSETRON 4 MG: 2 INJECTION INTRAMUSCULAR; INTRAVENOUS at 12:20

## 2023-03-06 RX ADMIN — Medication 100 MCG: at 14:01

## 2023-03-06 RX ADMIN — SODIUM CHLORIDE, SODIUM LACTATE, POTASSIUM CHLORIDE, AND CALCIUM CHLORIDE: .6; .31; .03; .02 INJECTION, SOLUTION INTRAVENOUS at 13:15

## 2023-03-06 RX ADMIN — Medication 100 MCG: at 12:44

## 2023-03-06 RX ADMIN — CELECOXIB 200 MG: 100 CAPSULE ORAL at 10:45

## 2023-03-06 RX ADMIN — MAGNESIUM SULFATE IN WATER 2000 MG: 40 INJECTION, SOLUTION INTRAVENOUS at 12:06

## 2023-03-06 RX ADMIN — LIDOCAINE HYDROCHLORIDE 60 MG: 20 INJECTION, SOLUTION INFILTRATION; PERINEURAL at 12:02

## 2023-03-06 RX ADMIN — Medication 100 MCG: at 12:53

## 2023-03-06 RX ADMIN — PROPOFOL 30 MG: 10 INJECTION, EMULSION INTRAVENOUS at 12:02

## 2023-03-06 RX ADMIN — TRANEXAMIC ACID 1000 MG: 100 INJECTION, SOLUTION INTRAVENOUS at 13:39

## 2023-03-06 RX ADMIN — FENTANYL CITRATE 25 MCG: 50 INJECTION, SOLUTION INTRAMUSCULAR; INTRAVENOUS at 11:59

## 2023-03-06 RX ADMIN — Medication 100 MCG: at 13:12

## 2023-03-06 RX ADMIN — FENTANYL CITRATE 25 MCG: 50 INJECTION INTRAMUSCULAR; INTRAVENOUS at 12:04

## 2023-03-06 RX ADMIN — Medication 100 MCG: at 13:52

## 2023-03-06 RX ADMIN — MIDAZOLAM 2 MG: 1 INJECTION INTRAMUSCULAR; INTRAVENOUS at 11:12

## 2023-03-06 RX ADMIN — SODIUM CHLORIDE, SODIUM LACTATE, POTASSIUM CHLORIDE, AND CALCIUM CHLORIDE: .6; .31; .03; .02 INJECTION, SOLUTION INTRAVENOUS at 11:51

## 2023-03-06 RX ADMIN — BUPIVACAINE HYDROCHLORIDE 13.5 MG: 7.5 INJECTION, SOLUTION SUBARACHNOID at 11:59

## 2023-03-06 RX ADMIN — ACETAMINOPHEN 1000 MG: 500 TABLET ORAL at 10:45

## 2023-03-06 RX ADMIN — LIDOCAINE HYDROCHLORIDE 40 MG: 20 INJECTION, SOLUTION INFILTRATION; PERINEURAL at 13:36

## 2023-03-06 RX ADMIN — Medication 10 MG: at 13:13

## 2023-03-06 RX ADMIN — Medication 20 MG: at 12:14

## 2023-03-06 RX ADMIN — TRANEXAMIC ACID 1000 MG: 100 INJECTION, SOLUTION INTRAVENOUS at 12:12

## 2023-03-06 RX ADMIN — DEXAMETHASONE SODIUM PHOSPHATE 4 MG: 4 INJECTION, SOLUTION INTRAMUSCULAR; INTRAVENOUS at 12:20

## 2023-03-06 RX ADMIN — Medication 100 MCG: at 13:16

## 2023-03-06 RX ADMIN — Medication 50 MCG: at 12:38

## 2023-03-06 RX ADMIN — PROPOFOL 10 MG: 10 INJECTION, EMULSION INTRAVENOUS at 12:04

## 2023-03-06 RX ADMIN — PROPOFOL 10 MG: 10 INJECTION, EMULSION INTRAVENOUS at 12:06

## 2023-03-06 RX ADMIN — PROPOFOL 20 MG: 10 INJECTION, EMULSION INTRAVENOUS at 13:36

## 2023-03-06 RX ADMIN — FENTANYL CITRATE 25 MCG: 50 INJECTION INTRAMUSCULAR; INTRAVENOUS at 12:13

## 2023-03-06 RX ADMIN — FENTANYL CITRATE 25 MCG: 50 INJECTION INTRAMUSCULAR; INTRAVENOUS at 12:22

## 2023-03-06 RX ADMIN — Medication 100 MCG: at 13:43

## 2023-03-06 RX ADMIN — PROPOFOL 100 MCG/KG/MIN: 10 INJECTION, EMULSION INTRAVENOUS at 12:02

## 2023-03-06 RX ADMIN — Medication 100 MCG: at 13:04

## 2023-03-06 RX ADMIN — BUPIVACAINE HYDROCHLORIDE 20 ML: 5 INJECTION, SOLUTION EPIDURAL; INTRACAUDAL; PERINEURAL at 11:15

## 2023-03-06 RX ADMIN — Medication 2000 MG: at 11:51

## 2023-03-06 ASSESSMENT — PAIN DESCRIPTION - LOCATION: LOCATION: KNEE

## 2023-03-06 ASSESSMENT — PAIN DESCRIPTION - ORIENTATION: ORIENTATION: RIGHT

## 2023-03-06 ASSESSMENT — PAIN SCALES - GENERAL: PAINLEVEL_OUTOF10: 3

## 2023-03-06 NOTE — PROGRESS NOTES
Physical Therapy  Facility/Department: [facility] OR  Physical Therapy Initial Assessment/Treatment     Name: Oli Sun  : 1955  MRN: [id number]  Date of Service: 3/6/2023    Discharge Recommendations:  24 hour supervision or assist   PT Equipment Recommendations  Equipment Needed: No  Other: Has RW      Patient Diagnosis(es): The encounter diagnosis was Localized osteoarthritis of left knee. Past Medical History:  has a past medical history of Hyperlipidemia, Menopause, Menopause, OA (osteoarthritis), PONV (postoperative nausea and vomiting), and  (spontaneous vaginal delivery). Past Surgical History:  has a past surgical history that includes Colonoscopy (02/15/2001, 2006, 10/1/12); [place] tooth extraction; and Total knee arthroplasty (Left, 2022). Assessment   Body Structures, Functions, Activity Limitations Requiring Skilled Therapeutic Intervention: Decreased functional mobility ; Decreased ROM; Increased pain;Decreased sensation;Decreased endurance;Decreased strength  Assessment: Pt to [facility] for R TKA on 3/06/23. PTA pt lives with  in 2 story house with no JARRETT and is able to lives on main floor with full bed/bathroom. Pt was independent with transfers and ambulation with no AD. Pt ganga is able to complete bed mobility, transfers, and ambulate with RW with grossly CGA to SBA. Pt able to navigate up<>down 1 curb step with min A. Pt demos safety with all mobilty and is safe to DC home with 24/7 assist when deemed medically appropriate.   Therapy Prognosis: Good  Decision Making: Medium Complexity  Barriers to Learning: None  Requires PT Follow-Up: Yes  Activity Tolerance  Activity Tolerance: Patient tolerated treatment well;Patient tolerated evaluation without incident     Plan   Physcial Therapy Plan  General Plan: 2 times a day 7 days a week  Current Treatment Recommendations: Strengthening, ROM, Balance training, Gait training, Home exercise program, Safety education & training, Therapeutic activities, Stair training, Functional mobility training, Transfer training, [place], Positioning, Pain management, Endurance training, Patient/Caregiver education & training, Neuromuscular re-education  Safety Devices  Type of Devices: Left in bed, Nurse notified, Gait belt  Restraints  Restraints Initially in Place: No     Restrictions  Restrictions/Precautions  Restrictions/Precautions: Weight Bearing  Lower Extremity Weight Bearing Restrictions  Right Lower Extremity Weight Bearing: Weight Bearing As Tolerated     Subjective   Pain: Denies pain  General  Chart Reviewed: Yes  Patient assessed for rehabilitation services?: Yes  Response To Previous Treatment: Not applicable  Family / Caregiver Present: Yes ( and daughter)  Referring Practitioner: Cecilia Vela PA-C  Referral Date : 03/06/23  Diagnosis: R TKA  Follows Commands: Within Functional Limits  General Comment  Comments: Pt in bed upon arrival, RN cleared for thearpy  Subjective  Subjective: pt pleasant and agreeable to therapy eval         Social/Functional History  Social/Functional History  Lives With: Spouse  Type of Home: House  Home Layout: Two level, Able to Live on Main level with bedroom/bathroom  Home Access: Level entry  Bathroom Shower/Tub: Walk-in shower, Shower chair without back  Bathroom Toilet: Standard  Home Equipment: Albino Hawks, rolling, Rollator, Cane, quad  Has the patient had two or more falls in the past year or any fall with injury in the past year?: No  ADL Assistance: Independent  Homemaking Assistance: Independent  Homemaking Responsibilities: Yes  Ambulation Assistance: Independent  Transfer Assistance: Independent  Active : Yes  Type of Occupation: Stay at [address] [place] [address],  [address]: read, go to the [facility] for water aerobics, sew    Vision/Hearing  Vision  Vision: Impaired  Vision Exceptions: Wears glasses at all times  Hearing  Hearing: Within functional limits      Cognition Orientation  Overall Orientation Status: Within Normal Limits  Orientation Level: Oriented X4     Objective   Heart Rate: 63  Heart Rate Source: Monitor  BP: 134/77  BP Location: Right upper arm  BP Method: Automatic  Patient Position: Semi fowlers  MAP (Calculated): 96  Resp: 16  SpO2: 98 %  O2 Device: None (Room air)        Gross Assessment  AROM: Generally decreased, functional  PROM: Generally decreased, functional  Strength: Generally decreased, functional  Sensation: Intact (decreased sensation from spinal block, reports her leg feels numb but is able to feel light touch throughout R LE.)        Bed Mobility Training  Bed Mobility Training: Yes  Overall Level of Assistance: Supervision  Supine to Sit: Supervision; Adaptive equipment (HOB elevated)  Sit to Supine: Supervision; Adaptive equipment (HOB elevated)  Scooting: Supervision  Balance  Sitting: Intact  Standing: Impaired (With RW)  Standing - Static: Constant support; Fair  Standing - Dynamic: Constant support; Fair  Transfer Training  Transfer Training: Yes  Interventions: Tactile cues; Safety awareness training;Verbal cues (Cues for hand placement with RW)  Sit to Stand: Contact-guard assistance  Stand to Sit: Contact-guard assistance  Toilet Transfer: Contact-guard assistance  Gait Training  Gait Training: Yes  Right Side Weight Bearing: As tolerated  Gait  Overall Level of Assistance: Contact-guard assistance;Stand-by assistance  Interventions: Safety awareness training;Verbal cues (Cues for walker managment and sequencing with RW)  Base of Support: Shift to left  Step Length: Left shortened  Stance: Left increased  Gait Abnormalities: Step to gait  Distance (ft): 20 Feet (20+20)  Assistive Device: Walker, rolling;Gait belt  Curbs/Ramps: Minimum assistance (1 curb step with RW)    Exercise Treatment: 5-8x TKA exericses      OutComes Score  AM-PAC Score  AM-PAC Inpatient Mobility Raw Score : 18 (03/06/23 1700)  -PAC Inpatient T-Scale Score : 43.63 (03/06/23 1700)  Mobility Inpatient CMS 0-100% Score: 46.58 (03/06/23 1700)  Mobility Inpatient CMS G-Code Modifier : CK (03/06/23 1700)      Goals  Short Term Goals  Time Frame for Short Term Goals: 3/08/23  Short Term Goal 1: pt will complete bed mobility with mod I  Short Term Goal 2: pt will complete transfers with supervision  Short Term Goal 3: pt will ambulate 100ft with LRAD and supervision  Short Term Goal 4: pt will participate in 10-12 reps of BLE exercises by 3/07/23  Additional Goals?: No  Patient Goals   Patient Goals : \"I want to get up and walk to the bathroom\" -- MET 3/06/23       Education  Patient Education  Education Given To: Patient; Family  Education Provided: Role of Therapy;Plan of Care;Home Exercise Program;Precautions; Equipment;Transfer Training  Education Method: Verbal;Printed Information/Hand-outs;Demonstration  Barriers to Learning: None  Education Outcome: Verbalized understanding;Demonstrated understanding    Patient Educated in safety with car transfers and  dispensed instruction on car transfers with use of assistive device with patient demonstrating:  [x] Verbalizing understanding of appropriate technique, maintaining any ordered precautions for car transfer training s/p TJR  [] [place] with simulated car transfer with walker  [x] He/she requires SBA assist and will have someone at discharge to help with transfers with patient verbalizing understanding of any ordered TJR precautions employing appropriate technique. [x] Other: Educated patient in written car transfer instruction with patient verbalizing understanding of appropriate techniques. Therapy Time   Individual Concurrent Group Co-treatment   Time In 1558         Time Out 1646         Minutes 48         Timed Code Treatment Minutes: 38 Minutes (10 min eval)        Sumanth Saucedo, PT, DPT    If pt is unable to be seen after this session, please let this note serve as discharge summary.   Please see case management note for discharge disposition. Thank you.

## 2023-03-06 NOTE — PROGRESS NOTES
Pt has been given discharge instructions and verbalizes understanding of the following: Dressing and incision care. Post op medications including when to take and possible side effects of the following medications: Anticoagulation, pain medication, and stool softeners. How to relieve pain and swelling through non- pharmacological comfort measures. When to call the office for questions or concerns, when to follow up with surgeon, and instructions on use of the incentive spirometer. Pt has worked with physical therapy and has received a list of exercises and has discussed precautions. Pt has been educated on use of ambulation aides, bed mobility, fall safety, bathing safety, and when to start physical therapy. Pt has been given instructions on how and when to use knee brace if required by surgeon. Pt has been educated on signs and symptoms of infection, inadequate pain control, and when and who to call for changes in condition. Pt is able to tolerate PO, pt has voided post-op, and pt has ambulated safely with physical therapy post-op. Pt has met discharge criteria.

## 2023-03-06 NOTE — ANESTHESIA POSTPROCEDURE EVALUATION
Department of Anesthesiology  Postprocedure Note    Patient: Dimitry Mackenzie  MRN: [id number]  YOB: 1955  Date of evaluation: 3/6/2023      Procedure Summary     Date: 03/06/23 Room / Location: [facility]    Anesthesia Start: 1928 Anesthesia Stop: 7920    Procedure: RIGHT TOTAL KNEE ARTHROPLASTY  (Right: Knee) Diagnosis:       Primary osteoarthritis of right knee      (RIGHT KNEE PRIMARY OSTEOARTHRITIS)    Surgeons: Gracy Savage MD Responsible Provider: Markie Narayanan MD    Anesthesia Type: general, regional, spinal ASA Status: 2          Anesthesia Type: No value filed.     Tisha Phase I: Tisha Score: 10    Tisha Phase II: Tisha Score: 10      Anesthesia Post Evaluation    Patient location during evaluation: PACU  Patient participation: complete - patient participated  Level of consciousness: awake and alert  Pain score: 0  Airway patency: patent  Nausea & Vomiting: no vomiting and no nausea  Complications: no  Cardiovascular status: blood pressure returned to baseline  Respiratory status: acceptable  Hydration status: stable  Multimodal analgesia pain management approach

## 2023-03-06 NOTE — PROGRESS NOTES
Patient received a spinal but is able to start moving toes. Patient denies pain at this time. Alert and orientated.

## 2023-03-06 NOTE — PROGRESS NOTES
Arrived to [facility], IV started, labs obtained, pre-op meds per order. Consents verified. NPO @ 9:30am per patient, completed carbohydrate loading. Personal belongings in [facility] bay 3 locker.

## 2023-03-06 NOTE — ANESTHESIA PRE PROCEDURE
Department of Anesthesiology  Preprocedure Note       Name:  Janay Bocanegra   Age:  80 y.o.  :  1955                                          MRN:  [id number]         Date:  3/6/2023      Surgeon: Lonna Frankel): Marisol Rodriguez MD    Procedure: Procedure(s):  RIGHT TOTAL KNEE ARTHROPLASTY               MEDACTA NOTE:  ADDUCTOR CANAL BLOCK    Medications prior to admission:   Prior to Admission medications    Medication Sig Start Date End Date Taking?  Authorizing Provider   meloxicam (MOBIC) 15 MG tablet TAKE 1 TABLET BY MOUTH EVERY DAY 23   Yasmine Skinner PA-C   Multiple Vitamins-Minerals (THERAPEUTIC MULTIVITAMIN-MINERALS) tablet Take 1 tablet by mouth daily    Historical Provider, MD   Red Yeast Rice Extract (RED YEAST RICE PO) Take by mouth  22  Historical Provider, MD   diclofenac (VOLTAREN) 50 MG EC tablet Take 1 tablet by mouth 2 times daily (with meals) 21  Alyssa Cortez MD   vitamin D (CHOLECALCIFEROL) 400 UNITS TABS tablet Take 1,000 Units by mouth daily     Historical Provider, MD       Current medications:    Current Facility-Administered Medications   Medication Dose Route Frequency Provider Last Rate Last Admin    tranexamic acid (CYKLOKAPRON) 1,000 mg in sodium chloride 0.9 % 100 mL IVPB  1,000 mg IntraVENous On Call to 1150 TEVIZZVISHAL        Followed by   Robina Hernandez tranexamic acid (CYKLOKAPRON) 1,000 mg in sodium chloride 0.9 % 100 mL IVPB  1,000 mg IntraVENous On Call to 1150 TEVIZZVISHAL        sodium chloride flush 0.9 % injection 5-40 mL  5-40 mL IntraVENous 2 times per day Yasmine Skinner PA-C        sodium chloride flush 0.9 % injection 5-40 mL  5-40 mL IntraVENous PRN Yasmine Skinner PA-C        0.9 % sodium chloride infusion   IntraVENous PRN Yasmine Skinner PA-C        ceFAZolin (ANCEF) 2000 mg in 0.9% sodium chloride 100 mL IVPB  2,000 mg IntraVENous On Call to 1150 TEVIZZVISHAL        ropivacaine 0.2% (NAROPIN) elastomeric infusion SOLN 600 mL 600 mL Infiltration Continuous Radha Ortiz PA-C        lidocaine PF 1 % injection 1 mL  1 mL IntraDERmal Once PRN Aubrey Xavier MD        acetaminophen (TYLENOL) tablet 1,000 mg  1,000 mg Oral Once Aubrey Xavier MD        celecoxib (CELEBREX) capsule 200 mg  200 mg Oral Once Aubrey Xavier MD        lactated ringers IV soln infusion   IntraVENous Continuous Aubrey Xavier MD        sodium chloride flush 0.9 % injection 5-40 mL  5-40 mL IntraVENous 2 times per day Aubrey Xavier MD        sodium chloride flush 0.9 % injection 5-40 mL  5-40 mL IntraVENous PRN Aubrey Xavier MD        0.9 % sodium chloride infusion   IntraVENous PRN Aubrey Xavier MD        magnesium sulfate 2000 mg in 50 mL IVPB premix  2,000 mg IntraVENous Once Aubrey Xavier MD        scopolamine (TRANSDERM-SCOP) transdermal patch 1 patch  1 patch TransDERmal Rosaline Ochoa MD           Allergies:     Allergies   Allergen Reactions    Penicillins Rash     ITCHING       Problem List:    Patient Active Problem List   Diagnosis Code    Pure hypercholesterolemia E78.00    Vitamin D deficiency E55.9    Primary osteoarthritis of both knees M17.0    Achilles tendon tear, right, sequela S86.011S    Localized osteoarthritis of left knee M17.12    Localized osteoarthritis of right knee M17.11       Past Medical History:        Diagnosis Date    Hyperlipidemia     Menopause     Menopause     OA (osteoarthritis)     PONV (postoperative nausea and vomiting)      (spontaneous vaginal delivery)            Past Surgical History:        Procedure Laterality Date    COLONOSCOPY  02/15/2001, 2006, 10/1/12    TOTAL KNEE ARTHROPLASTY Left 2022    LEFT TOTAL KNEE ARTHROPLASTY WITH ADDUCTOR CANAL BLOCK FOR PAIN CONTROL                  [facility]A performed by Madhavi Welsh MD at [facility]         Social History: Social History     Tobacco Use    Smoking status: Never    Smokeless tobacco: Never   Substance Use Topics    Alcohol use: No                                Counseling given: Not Answered      Vital Signs (Current):   Vitals:    03/01/23 1114   Weight: 184 lb (83.5 kg)                                              BP Readings from Last 3 Encounters:   02/16/23 136/74   11/03/22 130/82   08/29/22 133/66       NPO Status:                                                                                 BMI:   Wt Readings from Last 3 Encounters:   03/01/23 184 lb (83.5 kg)   02/16/23 184 lb (83.5 kg)   01/10/23 178 lb (80.7 kg)     Body mass index is 31.58 kg/m². CBC:   Lab Results   Component Value Date/Time    WBC 6.3 01/19/2023 10:59 AM    RBC 4.38 01/19/2023 10:59 AM    HGB 12.5 01/19/2023 10:59 AM    HCT 38.7 01/19/2023 10:59 AM    MCV 88.3 01/19/2023 10:59 AM    RDW 13.6 01/19/2023 10:59 AM     01/19/2023 10:59 AM       CMP:   Lab Results   Component Value Date/Time     01/19/2023 10:59 AM    K 4.5 01/19/2023 10:59 AM     01/19/2023 10:59 AM    CO2 26 01/19/2023 10:59 AM    BUN 13 01/19/2023 10:59 AM    CREATININE 0.6 01/19/2023 10:59 AM    GFRAA >60 07/18/2022 12:50 PM    GFRAA >60 10/19/2012 10:02 AM    AGRATIO 2.1 07/31/2018 10:11 AM    LABGLOM >60 01/19/2023 10:59 AM    GLUCOSE 94 01/19/2023 10:59 AM    PROT 7.2 07/31/2018 10:11 AM    PROT 7.1 10/19/2012 10:02 AM    CALCIUM 9.9 01/19/2023 10:59 AM    BILITOT 0.5 07/31/2018 10:11 AM    ALKPHOS 81 07/31/2018 10:11 AM    AST 13 07/31/2018 10:11 AM    ALT 12 07/31/2018 10:11 AM       POC Tests: No results for input(s): POCGLU, POCNA, POCK, POCCL, POCBUN, POCHEMO, POCHCT in the last 72 hours.     Coags:   Lab Results   Component Value Date/Time    PROTIME 12.4 01/19/2023 10:59 AM    INR 0.93 01/19/2023 10:59 AM    APTT 36.2 01/19/2023 10:59 AM       HCG (If Applicable): No results found for: PREGTESTUR, PREGSERUM, HCG, HCGQUANT     ABGs: No results found for: PHART, PO2ART, CUX0KCQ, GIR1RMJ, BEART, N8ZAYNIA     Type & Screen (If Applicable):  No results found for: LABABO, LABRH    Drug/Infectious Status (If Applicable):  No results found for: HIV, HEPCAB    COVID-19 Screening (If Applicable): No results found for: COVID19        Anesthesia Evaluation    Airway: Mallampati: I  TM distance: >3 FB   Neck ROM: full  Mouth opening: > = 3 FB   Dental: normal exam         Pulmonary:Negative Pulmonary ROS and normal exam  breath sounds clear to auscultation                             Cardiovascular:    (+) hyperlipidemia        Rhythm: regular  Rate: normal                    Neuro/Psych:   Negative Neuro/Psych ROS              GI/Hepatic/Renal: Neg GI/Hepatic/Renal ROS            Endo/Other: Negative Endo/Other ROS                    Abdominal:   (+) obese,           Vascular: negative vascular ROS. Other Findings:           Anesthesia Plan      general, regional and spinal     ASA 2     (Pt agrees to risks. Benefits and options of GETA. Questions answered. Willing to proceed.)  Induction: intravenous. Anesthetic plan and risks discussed with patient.                         Jeniffer Hancock MD   3/6/2023

## 2023-03-06 NOTE — H&P
Update History & Physical     The patient's History and Physical of 2/16/2023 was reviewed with the patient and I examined the patient. There was no change. The surgical site was confirmed by the patient and me. Plan: The risks, benefits, expected outcome, and alternative to the recommended procedure have been discussed with the patient / family. Patient understands and wants to proceed with the procedure.       Electronically signed by Bruno Hsu MD on 3/6/2023 at 10:34 AM

## 2023-03-06 NOTE — ANESTHESIA PROCEDURE NOTES
Spinal Block    Patient location during procedure: OR  End time: 3/6/2023 12:07 PM  Reason for block: primary anesthetic and at surgeon's request  Staffing  Performed: resident/CRNA   Resident/CRNA: Carmell Epley, APRN - CRNA  Spinal Block  Patient position: sitting  Prep: ChloraPrep  Patient monitoring: continuous pulse ox and frequent blood pressure checks  Approach: midline  Location: L3/L4  Provider prep: mask and sterile gloves  Local infiltration: lidocaine  Needle  Needle type: Pencan   Needle gauge: 24 G  Needle length: 4 in  Expiration date: 1/31/2024  Assessment  Sensory level: T6  Swirl obtained: Yes  CSF: clear  Attempts: 1  Hemodynamics: stable  Additional Notes  Easy placemen pt tolerated well  Preanesthetic Checklist  Completed: patient identified, IV checked, site marked, risks and benefits discussed, surgical/procedural consents, equipment checked, pre-op evaluation, timeout performed, anesthesia consent given, oxygen available, monitors applied/VS acknowledged, fire risk safety assessment completed and verbalized and blood product R/B/A discussed and consented

## 2023-03-06 NOTE — PROGRESS NOTES
Patient to PACU from OR. Report received from anesthesia  and RN. Patient with eyes closed, alert to voice and stable on room air. Patient denies pain when assessed. SCD's in place; ICE applied. VS obtained and filed. Will continue to monitor.

## 2023-03-06 NOTE — ANESTHESIA PROCEDURE NOTES
Peripheral Block    Patient location during procedure: holding area  Reason for block: post-op pain management and at surgeon's request  Start time: 3/6/2023 11:15 AM  End time: 3/6/2023 11:20 AM  Staffing  Anesthesiologist: Robyn Vamra MD  Preanesthetic Checklist  Completed: patient identified, IV checked, site marked, risks and benefits discussed, surgical/procedural consents, equipment checked, pre-op evaluation, timeout performed, anesthesia consent given and oxygen available  Peripheral Block   Patient position: supine  Prep: ChloraPrep  Provider prep: sterile gloves  Block type: Femoral  Adductor canal  Laterality: right  Injection technique: single-shot  Guidance: ultrasound guided    Needle   Needle type: short-bevel   Needle gauge: 21 G  Needle localization: ultrasound guidance  Needle length: 10 cm  Assessment   Injection assessment: negative aspiration for heme, no paresthesia on injection, local visualized surrounding nerve on ultrasound and no intravascular symptoms  Paresthesia pain: none  Slow fractionated injection: yes  Hemodynamics: stable  Real-time US image taken/store: yes  Outcomes: patient tolerated procedure well    Medications Administered  bupivacaine (PF) 0.5 % - Perineural   20 mL - 3/6/2023 11:15:00 AM

## 2023-03-06 NOTE — DISCHARGE INSTRUCTIONS
*** Please contact [address] with any questions or concerns after your discharge. *** Mon- Fri 9am- 5pm [phone]. If you have any issues or concerns after 5pm or on the weekend please call your surgeon's office. I will be contacting you in a few days to follow up. If you need a pain medication refill please contact your surgeon's office. [address] Christian[address] Olivares is participating in [facility] for Joint Replacement (CJR) 605 Onofre Rico is participating in a Medicare initiative called the [address] [place] [address] for Joint Replacement (CJR) model. Medicare designed this model to encourage higher quality care and greater financial accountability from hospitals when Medicare beneficiaries receive lower-extremity joint replacement procedures (Etowah Cowan), typically hip or knee replacements. [facility] Jagdish Marshall participation in the [address] [place] [address],[address] [facility] should not restrict your access to care for your medical condition or your freedom to choose your health care providers and services. All existing Medicare beneficiary protections continue to be available to you. The CJR model aims to help give you better care. The CJR model aims to support better and more efficient care for beneficiaries undergoing LEJR procedures. A CJR episode of care is typically defined as an admission of an eligible Medicare beneficiary to a hospital participating in the [address] [place] [address],[address] for an Etowah Cowan procedure. The LEJR procedure can take place in either an inpatient or outpatient setting and will still be considered a CJR episode of care. The CJR episode of care continues for 90 days following discharge.      This model uses episode payment and quality measurement for an episode of care associated with LEJR procedures to encourage hospitals, physicians, and post-acute care providers to work together to improve the quality and coordination of care from the initial hospitalization through recovery. Under the [address], Keira Olivares can earn additional payments from Medicare if we meet the high quality goals set by Medicare, while keeping hospital costs and care spending under control. If we dont meet these quality and cost goals, we may have to repay Medicare. Medicare is using the CJR model to encourage Keira Olivares to work more closely with your doctors and other health care providers that help patients recover after discharge from the hospital including, but not limited to, nursing homes (skilled nursing facilities), home health agencies, inpatient rehabilitation facilities, and long- term care hospitals. If you require a stay in a [facility] (SNF) to assist with your recovery from surgery and if, and only if, it is clinically appropriate, the CJR model permits Keira Olivares to discharge you to a high quality SNF sooner than the three days Medicare usually requires to cover a SNF stay. Medicare will monitor your care to ensure you and others are receiving high quality care. It's your choice which hospital, doctor, or other providers you use. You have the right to choose which hospital, doctor, or other post-hospital stay health care provider you use. If you believe that your care is adversely affected or have concerns about substandard care, you may call 1-800-MEDICARE or contact your states Quality Improvement Organization by going to: Suzie. To find a different doctor, visit [address] Physician Compare website, [email or web address], or call 1-800-MEDICARE (9-147.186.4054). TTY users should call 3-756.971.3071. To find a different hospital, visit [email or web address], or  call 1-800-MEDICARE (1- 589.581.2743). TTY users should call  [phone].     To find a different skilled nursing facility, visit [address] website, [email or web address] or call  1-800-MEDICARE (6-740.934.5542). TTY users should call 0-695-541- [phone]. To find a different home health agency, visit [address] [place] [address] website, [email or web address], or call 1-800-MEDICARE (2-275.521.1377). TTY users should call 1-363-559- 1568. For an explanation of how patients can access their health care records and beneficiary claims data, please visit [email or web address]    Get more information     If you have questions or want more information about the [employer] for Joint Replacement (CJR) model, call [facility] at [phone] or call 1-800-MEDICARE. You can also find additional information at https://innovation.cms.gov/initiatives/cjr. Total Knee Replacement  Discharge Instructions    To prevent Clot formation, you have been placed on the following medication:  Take aspirin 81 mg twice a day starting day after surgery   Surgical Site Care:  Keep incision clean and dry. May shower on post-op day #3 with waterproof dressing on. Change to new waterproof dressing between 5-7 days. You have a pain catheter inserted into your knee during surgery that you will go home with. This is intended to help control your pain postoperatively. The pain catheter should last between 3-4 days. When the pain ball is completely collapsed you can gently remove the clear waterproof dressing and the catheter should slide out easily. If there are any difficulties call the office. Pain catheter is set at 6 mL/h. If you feel your pain is greater than 7/10 you can increase it by 2 mL/h to a max of 14 mL/h. It is also common to have leakage around the catheter site. This is normal.  Please reinforce with waterproof dressing as needed.    Physical Therapy:  Weight Bearing Status:  Weight bearing as tolerated  Outpatient therapy-should start within 2-3 days  Precautions  Per Physical Therapy Handout  Pain Medications  You were given oxycodone (Oxycontin, Oxyir)  Wean off pain medications as you deem appropriate as long as pain is under control  Be sure to drink plenty of fluids (recommend water) while taking narcotic pain medications to prevent constipation  You may take an over the counter laxative or stool softener as needed to prevent/treat constipation as well, we recommend Senokot S OTC. We recommend that you consider taking these medications the entire time you are taking pain medication. Cold packs/Ice packs/Machine  May be used as much as necessary to control swelling/inflammation/soreness  Be sure to have a barrier (cloth, clothing, towel) between the site and the ice pack to prevent frostbite  Contact [facility]'s office if  Increased redness, swelling, drainage of any kind, and/or pain to surgery site. As well as new onset fevers and or chills. These could signify an infection. Calf or thigh tenderness to touch as well as increased swelling or redness. This could signify a clot formation. Numbness or tingling to an area around the incision site or below the incision site (toes). Any rash appears, increased  or new onset nausea/vomiting occur. This may indicate a reaction to a medication. Phone # [phone]  Follow up with Dr. Damaso Ely or Justin Jackson PA-C at scheduled appointment time. Please continue to use your Incentive Spirometer every hour while awake.      Discharge Medications    Narcotic Pain Medications    ____ Hydrocodone/acetaminophen 5/325mg 1 tab every 4 hours as needed for pain  __x__ Oxycodone 5mg 1 tab every 6 hours as needed for pain  ____ Tramadol 50mg 1 tab every 4 hours as needed for pain    Anti-inflammatory/Pain Medication    __x__ Meloxicam 15mg 1 tab once a day  ____ Celebrex 200mg 1 tab once a day  ___x_ Medrol Dosepak 1 kit as directed (start post-op day 1)    If Medrol Dosepak and either Meloxicam or Celebrex is prescribed complete the Medrol Dosepak before starting Meloxicam or Celebrex    Anti-coagulation Medication  __x__ Aspirin 81mg 1 tab twice per day  ____ Eliquis 2.5mg 1 tab twice per day  ____ Lovenox 40mg once per day  ____ Lovenox 30mg twice per day    Start anti-coagulation medications the day after surgery    Muscle relaxer     __x__ Cyclobenzaprine 10mg 1 tab up to 3 times a day as needed for muscle spasms and pain  ____ Methocarbamol 750mg 1 tab up to 3 times a day as needed for muscle spasms and pain          Nausea/Vomiting Medications    __x__ Ondansetron 4 mg 1 tab up to 4 times daily as needed  ____ Promethazine 25mg 1 tab up to 3 times daily as needed    Antibiotics    ____ Keflex 500mg 1 tab 4 times daily  __x__ Sulfamethoxazole/trimethoprim 800/160mg 1 tab twice per day  ____ Clindamycin 300mg 2 tabs twice per day  ____ cefadroxil 500mg 1 tab po twice per day   ANESTHESIA DISCHARGE INSTRUCTIONS    You are under the influence of drugs- do not drink alcohol, drive a car, operate machinery(such as power tools, kitchen appliances, etc), sign legal documents, or make any important decisions for 24 hours (or while on pain medications). Children should not ride bikes or [place] or play on gym sets  for 24 hours after surgery. A responsible adult should be with you for 24 hours. Rest at home today- increase activity as tolerated. Progress slowly to a regular diet unless your physician has instructed you otherwise. Drink plenty of water. CALL YOUR DOCTOR IF YOU:  Have moderate to severe nausea or vomiting AND are unable to hold down fluids or prescribed medications. Have bright red bloody drainage from your dressing that won't stop oozing.   Do not get relief with your pain medication    NORMAL (POSSIBLE) SIDE EFFECTS FROM ANESTHESIA:     Confusion, temporary memory loss, delayed reaction times in the first 24 hours  Lightheadedness, dizziness, difficulty focusing, blurred vision  Nausea/vomiting can happen  Shivering, feeling cold, sore throat, cough and muscle aches should stop within 24-48 hours  Trouble urinating - call your surgeon if it has been more than 8 hrs  Bruising or soreness at the IV site - call if it remains red, firm or there is drainage             FEMALES OF CHILDBEARING AGE WHO ARE TAKING BIRTH CONTROL PILLS:  You may have received a medication during your procedure that interferes with the   actions of birth control pills (Bridion or Emend). Use some other kind of birth control in addition to your pills, like a condom, for 1 month after your procedure to prevent unwanted pregnancy. The following instructions are to be followed if you have a known history or diagnosis of sleep apnea: For all sleep apnea patients:  ? Sleep on your side or sitting up in a chair whenever possible, especially the first 24 hours after surgery. ? Use only medicines prescribed by your doctor. ? Do not drink alcohol. ? If you have a dental device to assist you while at rest, use it at all times for the first 24 hours. For patients using CPAP machines:  ? Use your CPAP machine during all periods of sleep as usual.  ? Use your CPAP machine during all periods of daytime rest while on pain medicines. ** Follow up with your primary care doctor for continued care. IF YOU DO NOT TAKE ALL OF YOUR NARCOTIC PAIN MEDICATION, please dispose of them responsibly. There are drop off boxes in the Emergency Departments 24/7 at both [facility] and [place]. If these locations are not convenient, other options for discarding them can be found at:  [email or web address]    Hospital or office staff may NOT accept any medications to drop off in the cabinet for you.

## 2023-03-06 NOTE — PROGRESS NOTES
Patient able to move toes, but unable to lift leg off bed. Patient given a light snack and tolerating. On Q pain ball set at 6 ml/hr.

## 2023-03-06 NOTE — OP NOTE
Orthopaedic Surgery  Operative Report      Patient Name:  Daria Singletary  Patient :  1955  MRN: [id number]    Date: 23     Pre-operative Diagnosis:   M17.11 Primary Osteoarthritis    Post-operative Diagnosis:    Same    Procedure: RIGHT  74464 Total Knee Arthroplasty    Surgeon:  Maureen Henao) and Role:     * Madhavi Welsh MD - Primary    Assistant: Circulator: Elizabeth Conti RN; George Mccabe RN  Surgical Assistant: Laura Saldivar Circulator: Zeus Tompkins RN  Relief Scrub: Louise Henderson; Virgen Sanchez  Scrub Person First: Reed Tyler RN    Anesthesia: Spinal anesthesia with MAC sedation, Intraoperative local infiltration - Ortho-cocktail mixture, Regional with adductor canal block, and Placement of indwelling catheter    Estimated blood loss: 200    Specimens: * No specimens in log *    Complications: None    Drains: None    Condition: Stable    Implants:   Implant Name Type Inv. Item Serial No.  Lot No. LRB No. Used Action   CEMENT BNE 40 GM RADIOPAQUE BA SIMPLEX P - [id number]  CEMENT BNE 40 GM RADIOPAQUE BA SIMPLEX P  [employer] [facility] [id number] Right 1 Implanted   CEMENT BNE 40 GM RADIOPAQUE BA SIMPLEX P - [id number]  CEMENT BNE 40 GM RADIOPAQUE BA SIMPLEX P  [employer] [facility] [id number] Right 1 Implanted   COMPONENT TIB SZ 3 RT FIX MELVI GMK - [id number]  COMPONENT TIB SZ 3 RT FIX MELVI GMK  [employer] [facility] [id number] Right 1 Implanted   INSERT TIB SZ 3 [id number] RT FLX GMK SPHR - [id number]  INSERT TIB SZ 3 [id number] RT FLX GMK SPHR  [employer] [facility] [id number] Right 1 Implanted   COMPONENT FEM SZ 3 RT MELVI GMK SPHR - [id number]  COMPONENT FEM SZ 3 RT MELVI GMK SPHR  [employer] [facility] [id number] Right 1 Implanted   COMPONENT PAT SZ 3 RESURF AUG GMK - [id number]  COMPONENT PAT SZ 3 RESURF AUG GMK  [employer] [facility] [id number] Right 1 Implanted       Findings:   1. End stage OA  2. Varus gonarthrosis    Indications: The patient has been battling right knee pain for months to years. Pain has gotten worse recently. Patient has failed all preoperative conservative treatment options. The activities of daily living have been affected quite a bit. Patient wanted to regain mobility and be active as possible. Patient understood the risk benefits and alternatives in detail and wanted to proceed with the above operation. Procedure Details:   I marked the surgical site of the right knee for surgery. He was taken back to the operating theater laid supine the table the bony prominences well-padded. General anesthesia was induced. We transferred the patient to the operating table supine. The leg was prepped and draped in standard sterile fashion. An appropriate leg positioner,DeMayo , was used to stabilize the extremity. Antibiotics 2 g of Ancef were given. MRSA swab testing was performed preop, and no additional antibiotics were required. Tranexamic acid 1 g was given at start. Tourniquet was only used through the cementing portion of the case. Overall time was 14 minutes. We began with an anterior approach to the knee. Thick soft tissue flaps were developed. Medial parapatellar arthrotomy was extended through the fascial tissue. Underlying synovial fluid was evacuated. Small synovectomy was performed just superior to the distal femoral cartilage. Part of the infrapatellar fat pad was removed for exposure. A controlled lateral release just lateral to the patella bone was created for exposure. Patella was then everted and the knee flexed. 2 Hohmann type retractors were then placed to deliver the distal femur. [address] [place] [address] Patient specific instrumentation:  Electro Bovie cautery was then used to remove any existing cartilage tissue away from the footprints of the 3 dimensionally printed guides. This custom cutting block was then opposed to the distal femur and fixed with 3 threaded pins. Another threaded pin was then used to jacqueline rotation through this guide.   The resection levels were then double confirmed using jazmyne wing, both medial and lateral.  An oscillating saw blade was then used to create a distal femoral resection. The resections were then measured and the appropriate thicknesses were then achieved. This recreated our planned resection level based on her patient specific CT-based guide preoperatively. The rotational pins were then inserted. 4-in-1 cutting block was then placed on the distal femoral bone. Appropriate rotation was then achieved. 2 screws then stabilized this block in addition. An oscillating saw blade then removed the posterior resections. Once again, these resections were measured to confirm appropriate restoration of restricted kinematic protocol, based on our preoperative CT based plan. The remaining 3 cuts were then made. The notch tissue was then cleaned out and the edges of the femoral resections were then cleaned using a rongeur and electrocautery Bovie. Attention was then turned toward tibial exposure. The same was performed for the tibia as the femur. Existing cartilage tissue was then removed using electrocautery away from the footprints. A custom cutting block was then opposed to the proximal tibia and fixed with 3 threaded pins. Overall alignment restoration was then confirmed using a drop mando. This was designed in a way to make this parallel to the tibial shaft axis as planned per resection angle varus valgus, and slope. The resection levels were then triple confirmed using jazmyne wing, both medial and lateral.  An oscillating saw blade was then used to create the proximal tibial resection. This resection was also measured and the appropriate thickness was also restored. Lamina spreaders were then used to deliver the meniscal tissue to separate the distal femur and the proximal tibia. Electro Bovie cautery was then used to remove the meniscal remnants. Curved osteotome removed any posterior osteophytes.   Resections were then cleaned up and ready for trial.    Flexion and extension gap blocks were then assessed. I was happy with size 10 block in flexion and extension. Appropriate trials were then inserted. 10 mm thickness CS-medial pivot poly was used for the trial.  I checked this in extension and there was no laxity present medial or lateral.  In mid flexion, varus valgus testing resulted in femoral rotation as ideally expected. A 90 degrees flexion, there was no anterior to posterior instability. The medial pivot served as the hinge through which the proximal tibia was rotating. There was no laxity present medially under stress, and a small bit of laxity present laterally under stress as expected to restore native kinematics. The patella was then exposed. 2 point-to-point tenaculums then delivered and stabilized the patella. Appropriate resection freehand was then conducted of the patella. Thickness was confirmed pre and post.  A minimum of 12-14 mm was retained of existing bone. Size 3 patella was then trialed. The knee was taken through range of motion with all trial components in. The patella tracked midline in the femoral trochlea. 2 lug holes were drilled. The femoral finishing guide was used to create trochlear tracking groove. All trial components were then removed. Third-generation cement technique was then employed. Tourniquet was then insufflated. The back surfaces of the implants were coated immediately after cement was mixed and ready. The cut bone surfaces were also washed and dried until cancellous bone was visualized. Appropriate cement was then placed at the proximal tibial surface. The tibial baseplate was then impacted first.  Excess cement was removed. The actual polyethylene liner was then inserted. The distal femur was then exposed. Again, cement was placed onto the distal femoral surface. The actual component was then impacted into position with no complications.   Excess was again removed. The knee was then extended. Small bit of cement was used around the patella to secure the patellar button. This was held in position for a minimum of 15 minutes and until the cement had hardened. Insertion of adductor canal regional catheter: While the cement was curing, I made a small 1 cm window within the synovium on the medial side of the distal femur. I used a tonsil to gain access to the adductor canal bluntly. I used a retrograde technique for passing the adductor canal catheter where I entered the skin medially behind the synovium. I was able to bring the needle directly through the hole within the synovium. I then inserted the catheter using this technique. I was able to use a pituitary rongeur to deliver the catheter proximally up the canal. I then used 2-0 Vicryl in a running manner to help reapproximate the synovial lining, such that the catheter was completely extra-articular behind the synovium. Tourniquet was then let down. Hemostasis was then achieved again. Excess cement was chipped away and removed to avoid impingement and free bodies. The stability and unrestricted motion of the knee was then confirmed once more. We performed sequential closure. I irrigated the wound thoroughly with 3 L normal saline. I placed 2 g of vancomycin powder around the wound. I also injected a mixture of Marcaine and Exparel into the perioperative field. Adequate hemostasis was achieved. #2 Ethibond approximated the capsular tissue. #2 Quill suture approximated the fascial layer and medial parapatellar arthrotomy. 2-0 Vicryl interrupted sutures closed the subcutaneous tissue layer. Monocryl subcuticular suture was then applied. Dermabond Prineo was then used with a nonadhesive dressing. Patient then was reversed in anesthesia, transferred back the postoperative care unit without any complications. All instrument counts were correct x2.   I was present throughout the entire to the case with the exception of skin closure. PLAN:  - WBAT with assist device  - aspirin 81 mg BID  - ambulate postop with PT  - resume home meds, diet  - f/u scheduled with me in 2-3 weeks  - dispo: plan for discharge, therapy in PACU    REHAB: No knee immobilizer is needed. Patient is encouraged to fully flex and extend his knee when sitting upright and when in bed.        Electronically signed by Jennifer Wallace MD on 3/6/2023 at 1:46 PM

## 2023-03-06 NOTE — ANESTHESIA POSTPROCEDURE EVALUATION
Department of Anesthesiology  Postprocedure Note    Patient: Candy Foster  MRN: [id number]  YOB: 1955  Date of evaluation: 3/6/2023      Procedure Summary     Date: 03/06/23 Room / Location: [facility]    Anesthesia Start: 0256 Anesthesia Stop: 6691    Procedure: RIGHT TOTAL KNEE ARTHROPLASTY  (Right: Knee) Diagnosis:       Primary osteoarthritis of right knee      (RIGHT KNEE PRIMARY OSTEOARTHRITIS)    Surgeons: Tomasz Lopez MD Responsible Provider: Dennis Torres MD    Anesthesia Type: general, regional, spinal ASA Status: 2          Anesthesia Type: No value filed.     Tisha Phase I: Tisha Score: 10    Tisha Phase II:        Anesthesia Post Evaluation    Comments: Postoperative Anesthesia Note    Name:    Candy Foster  MRN:      [id number]    Patient Vitals in the past 12 hrs:  03/06/23 1418, BP:(!) 118/58, Temp:97.2 °F (36.2 °C), Temp src:Skin, Pulse:82, Resp:18, SpO2:95 %  03/06/23 1415, BP:120/61, Pulse:92, Resp:10, SpO2:92 %  03/06/23 1030, Temp:96.8 °F (36 °C), Temp src:Temporal, Pulse:82, Resp:16, SpO2:98 %     LABS:    CBC  Lab Results       Component                Value               Date/Time                  WBC                      6.3                 01/19/2023 10:59 AM        HGB                      12.5                01/19/2023 10:59 AM        HCT                      38.7                01/19/2023 10:59 AM        PLT                      273                 01/19/2023 10:59 AM   RENAL  Lab Results       Component                Value               Date/Time                  NA                       140                 01/19/2023 10:59 AM        K                        4.5                 01/19/2023 10:59 AM        CL                       102                 01/19/2023 10:59 AM        CO2                      26                  01/19/2023 10:59 AM        BUN                      13                  01/19/2023 10:59 AM        CREATININE 0.6                 01/19/2023 10:59 AM        GLUCOSE                  94                  01/19/2023 10:59 AM   COAGS  Lab Results       Component                Value               Date/Time                  PROTIME                  12.4                01/19/2023 10:59 AM        INR                      0.93                01/19/2023 10:59 AM        APTT                     36.2 (H)            01/19/2023 10:59 AM     Intake & Output: In: 1300 (I.V.:1300)  Out: 50     Nausea & Vomiting:  No    Level of Consciousness:  Awake    Pain Assessment:  Adequate analgesia    Anesthesia Complications:  No apparent anesthetic complications    SUMMARY      Vital signs stable  OK to discharge from Stage I post anesthesia care.   Care transferred from Anesthesiology department on discharge from perioperative area

## 2023-03-07 ENCOUNTER — CARE COORDINATION (OUTPATIENT)
Dept: CASE MANAGEMENT | Age: 68
End: 2023-03-07

## 2023-03-07 NOTE — CARE COORDINATION
Spoke with patient. Incision status: States dressing dry and intact with no drainage. Edema/Swelling: States swelling present. Continues with ice and elevation. Pain level and status: States pain is tolerable. Use of pain medications: States still has pain block and taking pain medications. Bowels: States bowels have not moved. States she is taking stool softeners twice a day, and has miralax to take if needed. States she has increased fiber and eating fruit. Outpatient therapy: Starts tomorrow    Do you have all of your medications: Yes, states taking aspirin 81 mg twice a day.     Changes in medications: No    Follow up appointments:    Future Appointments   Date Time Provider Cris Lashon   5/7/0765 42:71 AM Martha Mike, PT [facility] AT [facility] AND PT [facility]ia [facility]   3/23/2023 11:15 AM Alecia Church MD AND [facility] Moe BSN  Care Transition Nurse for ortho bundle  [phone]

## 2023-03-08 ENCOUNTER — HOSPITAL ENCOUNTER (OUTPATIENT)
Dept: PHYSICAL THERAPY | Age: 68
Setting detail: THERAPIES SERIES
Discharge: HOME | End: 2023-03-08
Payer: MEDICARE

## 2023-03-08 PROCEDURE — 97016 VASOPNEUMATIC DEVICE THERAPY: CPT | Performed by: PHYSICAL THERAPIST

## 2023-03-08 PROCEDURE — 97161 PT EVAL LOW COMPLEX 20 MIN: CPT | Performed by: PHYSICAL THERAPIST

## 2023-03-08 PROCEDURE — 97140 MANUAL THERAPY 1/> REGIONS: CPT | Performed by: PHYSICAL THERAPIST

## 2023-03-08 NOTE — PLAN OF CARE
[facility], [address]  Phone: [phone]  Fax [phone]     Physical Therapy Certification    Dear  Dr Nita Carey,    We had the pleasure of evaluating the following patient for physical therapy services at [address] [place] [address]. A summary of our findings can be found in the initial assessment below. This includes our plan of care. If you have any questions or concerns regarding these findings, please do not hesitate to contact me at the office phone number checked above. Thank you for the referral.       Physician Signature:_______________________________Date:__________________  By signing above (or electronic signature), therapists plan is approved by physician    Patient: Milad Hong   : 1955   MRN: [id number]  Referring Physician: Maria Isabel Monae MD      Evaluation Date: 3/8/2023      Medical Diagnosis Information:    B65.658 (ICD-10-CM) - S/P total knee arthroplasty, right   DOS:   3/6/23  Right knee OA M17.11,  right knee pain M25.561                                     Insurance information: PT Insurance Information: / [employer]       Precautions/ Contra-indications: OA    C-SSRS Triggered by Intake questionnaire (Past 2 wk assessment):   [x] No, Questionnaire did not trigger screening.   [] Yes, Patient intake triggered further evaluation      [] C-SSRS Screening completed  [] PCP notified via Plan of Care  [] Emergency services notified     Latex Allergy:  [x]NO      []YES  Preferred Language for Healthcare:   [x]English       []other:    SUBJECTIVE: Patient stated complaint:  PreHab: Pt has had worsening pain over the years. No previous surgeries. She has had viscosup and cortizone. Pt does not trust right knee on stairs. Pt fears that right knee will give out. Left knee feels stiff.       Post Op:   They had a hard time starting her IV, but this surgery the spinal and block were successful.  Pt has been using bike at home.  Pt is amb with walker. She is not driving.  Pt is not getting much sleep.  Pt is concerned about pain ball.     Relevant Medical History:left TKE 8/29/22  Functional Disability Index: LEFS 14 =83%    Pain Scale: 3-8 /10  Easing factors: meds, ices  Provocative factors: standing, walking,kneeling, squatting, stairs, sleeping     Type: []Constant   []Intermittent  []Radiating []Localized []other:     Numbness/Tingling: n/a    Occupation/School:Retired    Living Status/Prior Level of Function: Independent with ADLs and IADLs, travel    OBJECTIVE:     ROM LEFT RIGHT   Knee ext  0   Knee Flex  80   Ankle DF  5   Strength  LEFT RIGHT   Quad set  Fair   SLR  Able to do 10 without lag        Circumference                Pain Scale  3-8/10   LEFS  14 =83%     Reflexes/Sensation:    [x]Dermatomes/Myotomes intact    []Reflexes equal and normal bilaterally   []Other:    Joint mobility:    [x]Normal    []Hypo   []Hyper    Palpation: diffuse tenderness, beth medial and inf knee    Functional Mobility/Transfers: Assisted pt's leg on and off table due to pain ball    Posture:     Bandages/Dressings/Incisions: bandage was dry.  No active drainage.  Bruising, swelling, and heat present.  No signs of infection or clot.  Pain ball was clamp.  It was released.  Will reassess on Friday for possible removal    Gait: (include devices/WB status) amb with rolling walker.      Orthopedic Special Tests: - jake's                       [x] Patient history, allergies, meds reviewed. Medical chart reviewed. See intake form.     Review Of Systems (ROS):  [x]Performed Review of systems (Integumentary, CardioPulmonary, Neurological) by intake and observation. Intake form has been scanned into medical record. Patient has been instructed to contact their primary care physician regarding ROS issues if not already being addressed at this time.      Co-morbidities/Complexities (which will affect  course of rehabilitation):   []None           Arthritic conditions   []Rheumatoid arthritis (M05.9)  [x]Osteoarthritis (M19.91)   Cardiovascular conditions   []Hypertension (I10)  []Hyperlipidemia (E78.5)  []Angina pectoris (I20)  []Atherosclerosis (I70)   Musculoskeletal conditions   []Disc pathology   []Congenital spine pathologies   []Prior surgical intervention  []Osteoporosis (M81.8)  []Osteopenia (M85.8)   Endocrine conditions   []Hypothyroid (E03.9)  []Hyperthyroid Gastrointestinal conditions   []Constipation (J07.27)   Metabolic conditions   []Morbid obesity (E66.01)  []Diabetes type 1(E10.65) or 2 (E11.65)   []Neuropathy (G60.9)     Pulmonary conditions   []Asthma (J45)  []Coughing   []COPD (J44.9)   Psychological Disorders  []Anxiety (F41.9)  []Depression (F32.9)   []Other:   []Other:          Barriers to/and or personal factors that will affect rehab potential:              []Age  []Sex              []Motivation/Lack of Motivation                        []Co-Morbidities              []Cognitive Function, education/learning barriers              []Environmental, home barriers              []profession/work barriers  []past PT/medical experience  []other:  Justification: should progress well. Falls Risk Assessment (30 days):   [x] Falls Risk assessed and no intervention required.   [] Falls Risk assessed and Patient requires intervention due to being higher risk   TUG score (>12s at risk):     [] Falls education provided, including       ASSESSMENT:   Functional Impairments:     []Noted lumbar/proximal hip/LE joint hypomobility   [x]Decreased LE functional ROM   [x]Decreased core/proximal hip strength and neuromuscular control   [x]Decreased LE functional strength   [x]Reduced balance/proprioceptive control   []other:      Functional Activity Limitations (from functional questionnaire and intake)   []Reduced ability to tolerate prolonged functional positions   [x]Reduced ability or difficulty with changes of positions or transfers between positions   [x]Reduced ability to maintain good posture and demonstrate good body mechanics with sitting, bending, and lifting   [x]Reduced ability to sleep   [x] Reduced ability or tolerance with driving and/or computer work   [x]Reduced ability to perform lifting, carrying tasks   [x]Reduced ability to squat   []Reduced ability to forward bend   [x]Reduced ability to ambulate prolonged functional periods/distances/surfaces   [x]Reduced ability to ascend/descend stairs   [x]Reduced ability to run, hop, cut or jump   []other:    Participation Restrictions   [x]Reduced participation in self care activities   [x]Reduced participation in home management activities   []Reduced participation in work activities   [x]Reduced participation in social activities. []Reduced participation in sport/recreation activities. Classification :    [x]Signs/symptoms consistent with post-surgical status including decreased ROM, strength and function.    []Signs/symptoms consistent with joint sprain/strain   []Signs/symptoms consistent with patella-femoral syndrome   []Signs/symptoms consistent with knee OA/hip OA   []Signs/symptoms consistent with internal derangement of knee/Hip   []Signs/symptoms consistent with functional hip weakness/NMR control      []Signs/symptoms consistent with tendinitis/tendinosis    []signs/symptoms consistent with pathology which may benefit from Dry needling      []other:      Prognosis/Rehab Potential:      []Excellent   [x]Good    []Fair   []Poor    Tolerance of evaluation/treatment:    []Excellent   [x]Good    []Fair   []Poor    Physical Therapy Evaluation Complexity Justification  [x] A history of present problem with:  [] no personal factors and/or comorbidities that impact the plan of care;  [x]1-2 personal factors and/or comorbidities that impact the plan of care  []3 personal factors and/or comorbidities that impact the plan of care  [x] An examination of body systems using standardized tests and measures addressing any of the following: body structures and functions (impairments), activity limitations, and/or participation restrictions;:  [] a total of 1-2 or more elements   [] a total of 3 or more elements   [x] a total of 4 or more elements   [x] A clinical presentation with:  [x] stable and/or uncomplicated characteristics   [] evolving clinical presentation with changing characteristics  [] unstable and unpredictable characteristics;   [x] Clinical decision making of [x] low, [] moderate, [] high complexity using standardized patient assessment instrument and/or measurable assessment of functional outcome. [x] EVAL (LOW) 43986 (typically 20 minutes face-to-face)  [] EVAL (MOD) 38183 (typically 30 minutes face-to-face)  [] EVAL (HIGH) 10494 (typically 45 minutes face-to-face)  [] RE-EVAL       PLAN:   Frequency/Duration:  2 days per week for 12 Weeks:  Interventions:  [x]  Therapeutic exercise including: strength training, ROM, for Lower extremity and core   [x]  NMR activation and proprioception for LE, Glutes and Core   [x]  Manual therapy as indicated for LE, Hip and spine to include: Dry Needling/IASTM, STM, PROM, Gr I-IV mobilizations, manipulation. [x] Modalities as needed that may include: thermal agents, E-stim, Biofeedback, US, iontophoresis as indicated  [x] Patient education on joint protection, postural re-education, activity modification, progression of HEP. HEP instruction: [id number]    GOALS:  Patient stated goal: walking without pain  [] Progressing: [] Met: [] Not Met: [] Adjusted    Therapist goals for Patient:   Short Term Goals: To be achieved in: 2 weeks  1. Independent in HEP and progression per patient tolerance, in order to prevent re-injury. [] Progressing: [] Met: [] Not Met: [] Adjusted  2. Patient will have a decrease in pain to facilitate improvement in movement, function, and ADLs as indicated by Functional Deficits.   [] Progressing: [] Met: [] Not Met: [] Adjusted    Long Term Goals: To be achieved in: 12 weeks  1. Disability index score of 35% or less per LEFS to assist with reaching prior level of function. [] Progressing: [] Met: [] Not Met: [] Adjusted  2. Patient will demonstrate increased AROM of right knee from 0 - 125 to allow for proper joint functioning as indicated by patients Functional Deficits. [] Progressing: [] Met: [] Not Met: [] Adjusted  3. Patient will demonstrate an increase in Strength to good proximal hip strength and control, within 5lb HHD in LE to allow for proper functional mobility as indicated by patients Functional Deficits. [] Progressing: [] Met: [] Not Met: [] Adjusted  4. Patient will return to ascending and descending a flight of stairs with reciprocal, symmetrical gait without increased symptoms or restriction. [] Progressing: [] Met: [] Not Met: [] Adjusted  5. Able to drive car 1 hour to get to and from apptments. [] Progressing: [] Met: [] Not Met: [] Adjusted   6. Able to sleep 7-8 hours without waking from knee  [] Progressing: [] Met: [] Not Met: [] Adjusted   7. Able to don and doff right sock and shoe without right knee restriction.    [] Progressing: [] Met: [] Not Met: [] Adjusted     Electronically signed by:  Reed Awad, PT

## 2023-03-08 NOTE — FLOWSHEET NOTE
[facility],  [address]  Phone: [phone]  Fax [phone]    Physical Therapy Treatment Note/ Progress Report:           Date:  3/8/2023    Patient Name:  Yazan Moyer    :  1955  MRN: [id number]  Restrictions/Precautions:    Medical/Treatment Diagnosis Information:  T82.189 (ICD-10-CM) - S/P total knee arthroplasty, right   DOS:   3/6/23  Right knee OA M17.11,  right knee pain L63.690  Insurance/Certification information:  [facility]  Physician Information:  Matthew Mendes MD  Has the plan of care been signed (Y/N):        []  Yes  [x]  No     Date of Patient follow up with Physician: 3/23/23      Is this a Progress Report:     []  Yes  [x]  No        If Yes:  Date Range for reporting period:  Beginning: 3/8/23  Ending:     Progress report will be due (10 Rx or 30 days whichever is less): 69       Recertification will be due (POC Duration  / 90 days whichever is less):       Visit # Insurance Allowable Auth Required   In-person 1 bmn []  Yes [x]  No    Telehealth   []  Yes []  No    Total        Therapy Diagnosis/Practice Pattern:H      Number of Comorbidities:  []0     [x]1-2    []3+    Latex Allergy:  [x]NO      []YES  Preferred Language for Healthcare:   [x]English       []other:    SUBJECTIVE:  See eval    OBJECTIVE: See eval  Observation:   Test measurements:    ROM RIGHT  3/23/23 current   Knee ext 0    Knee Flex 80    Ankle DF 5    Strength  RIGHT    Quad set Fair    SLR Able to do 10 without lag           Circumference                   Pain Scale 3-8/10    LEFS 14 =83%       RESTRICTIONS/PRECAUTIONS: both knees are replaced.     Exercises/Interventions:     Therapeutic Ex (12425) Sets/sec Reps Notes/CUES         QS :10 X 10 hep   SLR  X 10 hep   Ankle pumps  X 10 hep   Seated hs s/  seated towel s  hep                                              Manual Intervention ([phone])      Seated PROM  8 min Performed on left knee as well                                 NMR re-education (44545)   CUES NEEDED                                                         Therapeutic Activity (69853)                                             Access Code: [email or web address]  URL: [email or web address]  Date: 79/50/1649  Prepared by: Rose Tarrs    Exercises  Long Sitting Calf Stretch with Strap - 2 x daily - 7 x weekly - 3-5 reps - 30 seconds hold  Sitting Heel Slide with Towel - 2 x daily - 7 x weekly - 10 reps - 15 seconds hold  Seated Table Hamstring Stretch - 2 x daily - 7 x weekly - 5 reps - 30 seconds hold  Long Sitting Quad Set - 2 x daily - 7 x weekly - 10 reps - 10 seconds hold  Straight Leg Raise - 2 x daily - 7 x weekly - 3 sets - 10 reps - 1 hold  Supine Ankle Pumps - 2 x daily - 7 x weekly - 3 sets - 10 reps      Therapeutic Exercise and NMR EXR  [x] (37028) Provided verbal/tactile cueing for activities related to strengthening, flexibility, endurance, ROM for improvements in LE, proximal hip, and core control with self care, mobility, lifting, ambulation.  [] (61228) Provided verbal/tactile cueing for activities related to improving balance, coordination, kinesthetic sense, posture, motor skill, proprioception  to assist with LE, proximal hip, and core control in self care, mobility, lifting, ambulation and eccentric single leg control.      NMR and Therapeutic Activities:    [] (30465 or 64372) Provided verbal/tactile cueing for activities related to improving balance, coordination, kinesthetic sense, posture, motor skill, proprioception and motor activation to allow for proper function of core, proximal hip and LE with self care and ADLs  [] (61911) Gait Re-education- Provided training and instruction to the patient for proper LE, core and proximal hip recruitment and positioning and eccentric body weight control with ambulation re-education including up and down stairs     Home Exercise Program:    [x] (66453) Reviewed/Progressed HEP activities related to strengthening, flexibility, endurance, ROM of core, proximal hip and LE for functional self-care, mobility, lifting and ambulation/stair navigation   [] (95740)Reviewed/Progressed HEP activities related to improving balance, coordination, kinesthetic sense, posture, motor skill, proprioception of core, proximal hip and LE for self care, mobility, lifting, and ambulation/stair navigation      Manual Treatments:  PROM / STM / Oscillations-Mobs:  G-I, II, III, IV (PA's, Inf., Post.)  [x] (91452) Provided manual therapy to mobilize LE, proximal hip and/or LS spine soft tissue/joints for the purpose of modulating pain, promoting relaxation,  increasing ROM, reducing/eliminating soft tissue swelling/inflammation/restriction, improving soft tissue extensibility and allowing for proper ROM for normal function with self care, mobility, lifting and ambulation. Modalities:     [] GAME READY (VASO)- for significant edema, swelling, pain control. Charges  Timed Code Treatment Minutes: 20   Total Treatment Minutes: 50     Medicare total (add KX at $2000)  155     [x] EVAL (LOW) 32887   [] EVAL (MOD) 25316  [] EVAL (HIGH) 23194   [] RE-EVAL     [] PJ(18306) x    [] IONTO  [] NMR (39704) x     [x] VASO  [x] Manual (12913) x      [] Other:  [] TA x      [] Mech Traction (96813)  [] ES(attended) (03634)      [] ES (un) (79363):       GOALS:   Patient stated goal: walking without pain  [] Progressing: [] Met: [] Not Met: [] Adjusted    Therapist goals for Patient:   Short Term Goals: To be achieved in: 2 weeks  1. Independent in HEP and progression per patient tolerance, in order to prevent re-injury. [] Progressing: [] Met: [] Not Met: [] Adjusted  2. Patient will have a decrease in pain to facilitate improvement in movement, function, and ADLs as indicated by Functional Deficits.   [] Progressing: [] Met: [] Not Met: [] Adjusted    Long Term Goals: To be achieved in: 12 weeks  1. Disability index score of 35% or less per LEFS to assist with reaching prior level of function. [] Progressing: [] Met: [] Not Met: [] Adjusted  2. Patient will demonstrate increased AROM of right knee from 0 - 125 to allow for proper joint functioning as indicated by patients Functional Deficits. [] Progressing: [] Met: [] Not Met: [] Adjusted  3. Patient will demonstrate an increase in Strength to good proximal hip strength and control, within 5lb HHD in LE to allow for proper functional mobility as indicated by patients Functional Deficits. [] Progressing: [] Met: [] Not Met: [] Adjusted  4. Patient will return to ascending and descending a flight of stairs with reciprocal, symmetrical gait without increased symptoms or restriction. [] Progressing: [] Met: [] Not Met: [] Adjusted  5. Able to drive car 1 hour to get to and from apptments. [] Progressing: [] Met: [] Not Met: [] Adjusted   6. Able to sleep 7-8 hours without waking from knee  [] Progressing: [] Met: [] Not Met: [] Adjusted   7. Able to don and doff right sock and shoe without right knee restriction. [] Progressing: [] Met: [] Not Met: [] Adjusted     Progression Towards Functional goals:  [] Patient is progressing as expected towards functional goals listed. [] Progression is slowed due to complexities listed. [] Progression has been slowed due to co-morbidities. [x] Plan just implemented, too soon to assess goals progression  [] Other:         Overall Progression Towards Functional goals/ Treatment Progress Update:  [] Patient is progressing as expected towards functional goals listed. [] Progression is slowed due to complexities/Impairments listed. [] Progression has been slowed due to co-morbidities.   [x] Plan just implemented, too soon to assess goals progression <30days   [] Goals require adjustment due to lack of progress  [] Patient is not progressing as expected and requires additional follow up with physician  [] Other    Prognosis for POC: [x] Good [] Fair  [] Poor      Patient requires continued skilled intervention: [x] Yes  [] No    Treatment/Activity Tolerance:  [x] Patient able to complete treatment  [] Patient limited by fatigue  [] Patient limited by pain    [] Patient limited by other medical complications  [] Other:     ASSESSMENT: see eval        PLAN: See eval  [] Continue per plan of care [] Alter current plan (see comments above)  [x] Plan of care initiated [] Hold pending MD visit [] Discharge      Electronically signed by:  Joey Gamino PT    Note: If patient does not return for scheduled/ recommended follow up visits, this note will serve as a discharge from care along with most recent update on progress.

## 2023-03-09 ENCOUNTER — CARE COORDINATION (OUTPATIENT)
Dept: CASE MANAGEMENT | Age: 68
End: 2023-03-09

## 2023-03-09 NOTE — CARE COORDINATION
Spoke with patient. Incision status: States free from redness or drainage. States dressing dry and intact to pain ball. States that she feels the pain ball is not working. Notified physician office, Mansoor Frederick and states that it takes a while to notice that it is working and to leave in place until the weekend. Discussed with patient, and she will leave in place, and will notify of any complications. Edema/Swelling: States swelling present, but not much. Continues to ice and elevate. Pain level and status: States pain is tolerable. Use of pain medications: States taking oxycodone for pain relief. Bowels: States bowels have not moved, but feels like they are going to. Continues with stool softeners and miralax. Outpatient therapy: Continues and has therapy tomorrow.     Do you have all of your medications: Yes    Changes in medications: No    Follow up appointments:    Future Appointments   Date Time Provider Cris Oates   3/10/2023 11:00 AM Axel Freeman PT [facility] AND PT [facility] [place]   7/36/3555 23:08 AM Johnnie Inman PT [facility] AND PT [place] [place]   4/45/9147  7:42 AM Johnnie Inman PT [facility] AND PT [place] [place]   8/37/6893 23:32 AM Johnnie Inman PT [facility] AND PT [place] [place]   3/23/2023 11:15 AM Abel James MD AND [facility]   3/23/2023 12:00 PM Axel Freeman PT [address] BSN  Care Transition Nurse for ortho bundle  [phone]

## 2023-03-10 ENCOUNTER — HOSPITAL ENCOUNTER (OUTPATIENT)
Dept: PHYSICAL THERAPY | Age: 68
Setting detail: THERAPIES SERIES
Discharge: HOME | End: 2023-03-10
Payer: MEDICARE

## 2023-03-10 PROCEDURE — 97016 VASOPNEUMATIC DEVICE THERAPY: CPT | Performed by: PHYSICAL THERAPIST

## 2023-03-10 PROCEDURE — 97110 THERAPEUTIC EXERCISES: CPT | Performed by: PHYSICAL THERAPIST

## 2023-03-10 PROCEDURE — 97140 MANUAL THERAPY 1/> REGIONS: CPT | Performed by: PHYSICAL THERAPIST

## 2023-03-10 NOTE — FLOWSHEET NOTE
[place] [facility] [facility] [facility], [address]  Phone: [phone]  Fax [phone]    Physical Therapy Treatment Note/ Progress Report:           Date:  3/10/2023    Patient Name:  Medina Espinosa    :  1955  MRN: [id number]  Restrictions/Precautions:    Medical/Treatment Diagnosis Information:  P81.775 (ICD-10-CM) - S/P total knee arthroplasty, right   DOS:   3/6/23  Right knee OA M17.11,  right knee pain L91.868  Insurance/Certification information:  [facility]  Physician Information:  Stephen Malone MD  Has the plan of care been signed (Y/N):        []  Yes  [x]  No     Date of Patient follow up with Physician: 3/23/23      Is this a Progress Report:     []  Yes  [x]  No        If Yes:  Date Range for reporting period:  Beginning: 3/8/23  Ending:     Progress report will be due (10 Rx or 30 days whichever is less): 23       Recertification will be due (POC Duration  / 90 days whichever is less):       Visit # Insurance Allowable Auth Required   In-person 2 bmn []  Yes [x]  No    Telehealth   []  Yes []  No    Total        Therapy Diagnosis/Practice Pattern:H      Number of Comorbidities:  []0     [x]1-2    []3+    Latex Allergy:  [x]NO      []YES  Preferred Language for Healthcare:   [x]English       []other:    SUBJECTIVE: Pt states pain level has ranged for 6-8/10 pain in the R knee. Pt stated pain pump has not been working and was unable to get relief from this. Patient stated she took her pain medication prior to therapy visit today. OBJECTIVE: Pain pump removed in clinic secondary to malfunction of pump. Wound care provided.     Observation:    Test measurements:    ROM RIGHT  3/23/23 Current 3/10/23   Knee ext 0 0   Knee Flex 80 95   Ankle DF 5    Strength  RIGHT    Quad set Fair good   SLR Able to do 10 without lag Able to perform 2x10 without lag          Circumference Pain Scale 3-8/10    LEFS 14 =83%       RESTRICTIONS/PRECAUTIONS: both knees are replaced. Exercises/Interventions:     Therapeutic Ex (76433) Sets/sec Reps Notes/CUES   Recumbent Bike  nv? QS :10 X 15 HEP   SLR  2 x 10 HEP   Ankle pumps  X 10 HEP   Seated hs stretch 20 sec 3 HEP   Supine heel slides with green strap 10 2 Therapist assist for positioning of R knee   Seated gastroc stretch with strap 20 sec 3                Patient education   X 5 min  Elevating, modalities, compression, etc                Manual Intervention (77164)      Seated PROM R knee flex/ext  5 min    Supine PROM R knee flexion/ext  10 min                            NMR re-education (90136)   CUES NEEDED         Gait training  X 2 min  Walking out of clinic to waiting room                                             Therapeutic Activity (83087)                                             Access Code: [email or web address]  URL: [email or web address]  Date: 02/75/5536  Prepared by: Weston Fischer    Exercises  Long Sitting Calf Stretch with Strap - 2 x daily - 7 x weekly - 3-5 reps - 30 seconds hold  Sitting Heel Slide with Towel - 2 x daily - 7 x weekly - 10 reps - 15 seconds hold  Seated Table Hamstring Stretch - 2 x daily - 7 x weekly - 5 reps - 30 seconds hold  Long Sitting Quad Set - 2 x daily - 7 x weekly - 10 reps - 10 seconds hold  Straight Leg Raise - 2 x daily - 7 x weekly - 3 sets - 10 reps - 1 hold  Supine Ankle Pumps - 2 x daily - 7 x weekly - 3 sets - 10 reps      Therapeutic Exercise and NMR EXR  [x] (14311) Provided verbal/tactile cueing for activities related to strengthening, flexibility, endurance, ROM for improvements in LE, proximal hip, and core control with self care, mobility, lifting, ambulation.  [] (11797) Provided verbal/tactile cueing for activities related to improving balance, coordination, kinesthetic sense, posture, motor skill, proprioception  to assist with LE, proximal hip, and core control in self care, mobility, lifting, ambulation and eccentric single leg control. NMR and Therapeutic Activities:    [] (20771 or 63629) Provided verbal/tactile cueing for activities related to improving balance, coordination, kinesthetic sense, posture, motor skill, proprioception and motor activation to allow for proper function of core, proximal hip and LE with self care and ADLs  [] (89992) Gait Re-education- Provided training and instruction to the patient for proper LE, core and proximal hip recruitment and positioning and eccentric body weight control with ambulation re-education including up and down stairs     Home Exercise Program:    [x] (15847) Reviewed/Progressed HEP activities related to strengthening, flexibility, endurance, ROM of core, proximal hip and LE for functional self-care, mobility, lifting and ambulation/stair navigation   [] (14022)Reviewed/Progressed HEP activities related to improving balance, coordination, kinesthetic sense, posture, motor skill, proprioception of core, proximal hip and LE for self care, mobility, lifting, and ambulation/stair navigation      Manual Treatments:  PROM / STM / Oscillations-Mobs:  G-I, II, III, IV (PA's, Inf., Post.)  [x] (21829) Provided manual therapy to mobilize LE, proximal hip and/or LS spine soft tissue/joints for the purpose of modulating pain, promoting relaxation,  increasing ROM, reducing/eliminating soft tissue swelling/inflammation/restriction, improving soft tissue extensibility and allowing for proper ROM for normal function with self care, mobility, lifting and ambulation. Modalities:     [x] GAME READY (VASO)- for significant edema, swelling, pain control.  15 min    Charges  Timed Code Treatment Minutes: 35   Total Treatment Minutes: 50     Medicare total (add KX at $2000)  223     [] EVAL (LOW) 34328   [] EVAL (MOD) 66275  [] EVAL (HIGH) 29172   [] RE-EVAL     [x] FL(46756) x  1  [] IONTO  [] NMR (53002) x     [x] VASO  [x] Manual (76335) x 1   [] Other:  [] TA x      [] [facility] Traction (07495)  [] ES(attended) (24322)      [] ES (un) (62629):       GOALS:   Patient stated goal: walking without pain  [] Progressing: [] Met: [] Not Met: [] Adjusted    Therapist goals for Patient:   Short Term Goals: To be achieved in: 2 weeks  1. Independent in HEP and progression per patient tolerance, in order to prevent re-injury. [x] Progressing: [] Met: [] Not Met: [] Adjusted  2. Patient will have a decrease in pain to facilitate improvement in movement, function, and ADLs as indicated by Functional Deficits. [] Progressing: [] Met: [] Not Met: [] Adjusted    Long Term Goals: To be achieved in: 12 weeks  1. Disability index score of 35% or less per LEFS to assist with reaching prior level of function. [] Progressing: [] Met: [] Not Met: [] Adjusted  2. Patient will demonstrate increased AROM of right knee from 0 - 125 to allow for proper joint functioning as indicated by patients Functional Deficits. [] Progressing: [] Met: [] Not Met: [] Adjusted  3. Patient will demonstrate an increase in Strength to good proximal hip strength and control, within 5lb HHD in LE to allow for proper functional mobility as indicated by patients Functional Deficits. [] Progressing: [] Met: [] Not Met: [] Adjusted  4. Patient will return to ascending and descending a flight of stairs with reciprocal, symmetrical gait without increased symptoms or restriction. [] Progressing: [] Met: [] Not Met: [] Adjusted  5. Able to drive car 1 hour to get to and from apptments. [] Progressing: [] Met: [] Not Met: [] Adjusted   6. Able to sleep 7-8 hours without waking from knee  [] Progressing: [] Met: [] Not Met: [] Adjusted   7. Able to don and doff right sock and shoe without right knee restriction. [] Progressing: [] Met: [] Not Met: [] Adjusted     Progression Towards Functional goals:  [x] Patient is progressing as expected towards functional goals listed.     [] Progression is slowed due to complexities listed. [] Progression has been slowed due to co-morbidities. [] Plan just implemented, too soon to assess goals progression  [] Other:         Overall Progression Towards Functional goals/ Treatment Progress Update:  [] Patient is progressing as expected towards functional goals listed. [] Progression is slowed due to complexities/Impairments listed. [] Progression has been slowed due to co-morbidities. [x] Plan just implemented, too soon to assess goals progression <30days   [] Goals require adjustment due to lack of progress  [] Patient is not progressing as expected and requires additional follow up with physician  [] Other    Prognosis for POC: [x] Good [] Fair  [] Poor      Patient requires continued skilled intervention: [x] Yes  [] No    Treatment/Activity Tolerance:  [x] Patient able to complete treatment  [] Patient limited by fatigue  [x] Patient limited by pain    [] Patient limited by other medical complications  [] Other:     ASSESSMENT: Pt required Blas for lifting R LE onto/off treatment table. Pt required Blas for initiation of 1st SLR with the ability to complete all other repetitions without assist. Pt required verbal cues to maintain breathing during SLR to avoid breath holding. R knee PROM flexion improved to 95 degrees from initial eval. May implement bike for ROM next visit. PLAN: See eval  [x] Continue per plan of care [] Alter current plan (see comments above)  [] Plan of care initiated [] Hold pending MD visit [] Discharge      Electronically signed by:  Elisabeth Singh, PT  Jennifer Finch, SPT  Therapist was present, directed the patient's care, made skilled judgement, and was responsible for assessment and treatment of the patient. Note: If patient does not return for scheduled/ recommended follow up visits, this note will serve as a discharge from care along with most recent update on progress.

## 2023-03-13 ENCOUNTER — CARE COORDINATION (OUTPATIENT)
Dept: CASE MANAGEMENT | Age: 68
End: 2023-03-13

## 2023-03-13 ENCOUNTER — HOSPITAL ENCOUNTER (OUTPATIENT)
Dept: PHYSICAL THERAPY | Age: 68
Setting detail: THERAPIES SERIES
Discharge: HOME | End: 2023-03-13
Payer: MEDICARE

## 2023-03-13 PROCEDURE — 97016 VASOPNEUMATIC DEVICE THERAPY: CPT | Performed by: PHYSICAL THERAPIST

## 2023-03-13 PROCEDURE — 97110 THERAPEUTIC EXERCISES: CPT | Performed by: PHYSICAL THERAPIST

## 2023-03-13 PROCEDURE — 97140 MANUAL THERAPY 1/> REGIONS: CPT | Performed by: PHYSICAL THERAPIST

## 2023-03-13 NOTE — FLOWSHEET NOTE
[place] [facility] [facility], [address]  Phone: [phone]  Fax [phone]    Physical Therapy Treatment Note/ Progress Report:           Date:  3/13/2023    Patient Name:  Teresa Lopez    :  1955  MRN: [id number]  Restrictions/Precautions:    Medical/Treatment Diagnosis Information:  A13.371 (ICD-10-CM) - S/P total knee arthroplasty, right   DOS:   3/6/23  Right knee OA M17.11,  right knee pain P00.845  Insurance/Certification information:  [facility]  Physician Information:  Kiki Grider MD  Has the plan of care been signed (Y/N):        []  Yes  [x]  No     Date of Patient follow up with Physician: 3/23/23      Is this a Progress Report:     []  Yes  [x]  No        If Yes:  Date Range for reporting period:  Beginning: 3/8/23  Ending:     Progress report will be due (10 Rx or 30 days whichever is less): 8/1/15       Recertification will be due (POC Duration  / 90 days whichever is less):       Visit # Insurance Allowable Auth Required   In-person 3 bmn []  Yes [x]  No    Telehealth   []  Yes []  No    Total        Therapy Diagnosis/Practice Pattern:H      Number of Comorbidities:  []0     [x]1-2    []3+    Latex Allergy:  [x]NO      []YES  Preferred Language for Healthcare:   [x]English       []other:    SUBJECTIVE: Pt cont to have high pain. Pt is trying not to take many pain meds. Pt is amb with rolling walker. Bruising present    OBJECTIVE: Pain pump removed in clinic secondary to malfunction of pump. Wound care provided.     Observation:    Test measurements:    ROM RIGHT  3/23/23 Current 3/10/23   Knee ext 0 0   Knee Flex 80 95   Ankle DF 5    Strength  RIGHT    Quad set Fair good   SLR Able to do 10 without lag Able to perform 2x10 without lag          Circumference                   Pain Scale 3-8/10    LEFS 14 =83%       RESTRICTIONS/PRECAUTIONS: both knees are replaced. Exercises/Interventions:     Therapeutic Ex (06040) Sets/sec Reps Notes/CUES   Recumbent Bike 5 min Rocking. QS :10 X 15 HEP   SLR  2 x 10 HEP   Seated hs stretch 20 sec 3 HEP   Supine heel slides with green strap 10 2 Therapist assist for positioning of R knee   Seated gastroc stretch with strap 20 sec 3    SL hip abd  2 x 10     LAQ  2 x 10    HR/TL  X 10    Standing HS curl  X 15                 Manual Intervention (33665)      Seated PROM R knee flex/ext  5 min    Supine PROM R knee flexion/ext  6 min    Pat mobs  2 min                      NMR re-education (45270)   CUES NEEDED                                                   Therapeutic Activity (08991)                                             Access Code: [email or web address]  URL: [email or web address]  Date: 64/43/7430  Prepared by: Estefani Do    Exercises  Long Sitting Calf Stretch with Strap - 2 x daily - 7 x weekly - 3-5 reps - 30 seconds hold  Sitting Heel Slide with Towel - 2 x daily - 7 x weekly - 10 reps - 15 seconds hold  Seated Table Hamstring Stretch - 2 x daily - 7 x weekly - 5 reps - 30 seconds hold  Long Sitting Quad Set - 2 x daily - 7 x weekly - 10 reps - 10 seconds hold  Straight Leg Raise - 2 x daily - 7 x weekly - 3 sets - 10 reps - 1 hold  Supine Ankle Pumps - 2 x daily - 7 x weekly - 3 sets - 10 reps      Therapeutic Exercise and NMR EXR  [x] (10498) Provided verbal/tactile cueing for activities related to strengthening, flexibility, endurance, ROM for improvements in LE, proximal hip, and core control with self care, mobility, lifting, ambulation.  [] (48257) Provided verbal/tactile cueing for activities related to improving balance, coordination, kinesthetic sense, posture, motor skill, proprioception  to assist with LE, proximal hip, and core control in self care, mobility, lifting, ambulation and eccentric single leg control.      NMR and Therapeutic Activities:    [] (72283 or 76687) Provided verbal/tactile cueing for activities related to improving balance, coordination, kinesthetic sense, posture, motor skill, proprioception and motor activation to allow for proper function of core, proximal hip and LE with self care and ADLs  [] (03184) Gait Re-education- Provided training and instruction to the patient for proper LE, core and proximal hip recruitment and positioning and eccentric body weight control with ambulation re-education including up and down stairs     Home Exercise Program:    [x] (55431) Reviewed/Progressed HEP activities related to strengthening, flexibility, endurance, ROM of core, proximal hip and LE for functional self-care, mobility, lifting and ambulation/stair navigation   [] (47071)Reviewed/Progressed HEP activities related to improving balance, coordination, kinesthetic sense, posture, motor skill, proprioception of core, proximal hip and LE for self care, mobility, lifting, and ambulation/stair navigation      Manual Treatments:  PROM / STM / Oscillations-Mobs:  G-I, II, III, IV (PA's, Inf., Post.)  [x] (05565) Provided manual therapy to mobilize LE, proximal hip and/or LS spine soft tissue/joints for the purpose of modulating pain, promoting relaxation,  increasing ROM, reducing/eliminating soft tissue swelling/inflammation/restriction, improving soft tissue extensibility and allowing for proper ROM for normal function with self care, mobility, lifting and ambulation. Modalities:     [x] GAME READY (VASO)- for significant edema, swelling, pain control.  15 min    Charges  Timed Code Treatment Minutes: 45   Total Treatment Minutes: 60     Medicare total (add KX at $2000)  320.10   97.10    [] EVAL (LOW) 43862   [] EVAL (MOD) 45594  [] EVAL (HIGH) 77697   [] RE-EVAL     [x] OF(54735) x  2  [] IONTO  [] NMR (36875) x     [x] VASO  [x] Manual (41082) x 1   [] Other:  [] TA x      [] Mech Traction (59672)  [] ES(attended) (15449)      [] ES (un) (58483):       GOALS:   Patient stated goal: walking without pain  [] Progressing: [] Met: [] Not Met: [] Adjusted    Therapist goals for Patient:   Short Term Goals: To be achieved in: 2 weeks  1. Independent in HEP and progression per patient tolerance, in order to prevent re-injury. [x] Progressing: [] Met: [] Not Met: [] Adjusted  2. Patient will have a decrease in pain to facilitate improvement in movement, function, and ADLs as indicated by Functional Deficits. [] Progressing: [] Met: [] Not Met: [] Adjusted    Long Term Goals: To be achieved in: 12 weeks  1. Disability index score of 35% or less per LEFS to assist with reaching prior level of function. [] Progressing: [] Met: [] Not Met: [] Adjusted  2. Patient will demonstrate increased AROM of right knee from 0 - 125 to allow for proper joint functioning as indicated by patients Functional Deficits. [x] Progressing: [] Met: [] Not Met: [] Adjusted  3. Patient will demonstrate an increase in Strength to good proximal hip strength and control, within 5lb HHD in LE to allow for proper functional mobility as indicated by patients Functional Deficits. [] Progressing: [] Met: [] Not Met: [] Adjusted  4. Patient will return to ascending and descending a flight of stairs with reciprocal, symmetrical gait without increased symptoms or restriction. [] Progressing: [] Met: [] Not Met: [] Adjusted  5. Able to drive car 1 hour to get to and from apptments. [] Progressing: [] Met: [] Not Met: [] Adjusted   6. Able to sleep 7-8 hours without waking from knee  [] Progressing: [] Met: [] Not Met: [] Adjusted   7. Able to don and doff right sock and shoe without right knee restriction. [] Progressing: [] Met: [] Not Met: [] Adjusted     Progression Towards Functional goals:  [x] Patient is progressing as expected towards functional goals listed. [] Progression is slowed due to complexities listed. [] Progression has been slowed due to co-morbidities.   [] Plan just implemented, too soon to assess goals progression  [] Other:         Overall Progression Towards Functional goals/ Treatment Progress Update:  [] Patient is progressing as expected towards functional goals listed. [] Progression is slowed due to complexities/Impairments listed. [] Progression has been slowed due to co-morbidities. [x] Plan just implemented, too soon to assess goals progression <30days   [] Goals require adjustment due to lack of progress  [] Patient is not progressing as expected and requires additional follow up with physician  [] Other    Prognosis for POC: [x] Good [] Fair  [] Poor      Patient requires continued skilled intervention: [x] Yes  [] No    Treatment/Activity Tolerance:  [x] Patient able to complete treatment  [] Patient limited by fatigue  [x] Patient limited by pain    [] Patient limited by other medical complications  [] Other:     ASSESSMENT: Pt was able to perform full rev on bike. Pt is able to perform SLR without ext lag. Pt needs continue ROM, strength, gait and balance intervention for return of function        PLAN: See eval  [x] Continue per plan of care [] Alter current plan (see comments above)  [] Plan of care initiated [] Hold pending MD visit [] Discharge      Electronically signed by:  West Gonzalez PT      Note: If patient does not return for scheduled/ recommended follow up visits, this note will serve as a discharge from care along with most recent update on progress.

## 2023-03-13 NOTE — CARE COORDINATION
Called patient for follow up with  [facility]. Left message for return call.   Kaushal BLANCON  Care Transition Nurse for ortho bundle  [phone]

## 2023-03-16 ENCOUNTER — CARE COORDINATION (OUTPATIENT)
Dept: CASE MANAGEMENT | Age: 68
End: 2023-03-16

## 2023-03-16 NOTE — CARE COORDINATION
Spoke with patient.    Incision status: States dressing changed Sunday and free from redness or drainage.    Edema/Swelling: States swelling has improved.    Pain level and status: States pain is tolerable.    Use of pain medications: States taking tylenol for pain relief.    Bowels: States bowels have moved three times today.    Outpatient therapy: Continues.    Do you have all of your medications: Yes    Changes in medications: No    Follow up appointments:    Future Appointments   Date Time Provider Department Center   3/17/2023  9:30 AM GELA Steinberg AND PT [facility]   3/20/2023 11:00 AM GELA Steinberg AND [facility]   3/23/2023 11:15 AM Luis Enrique Orr MD AND ORTH[facility]   3/23/2023 12:00 PM Anna Henao PT [facility] AND PT Vince LobatoRN BSN  Care Transition Nurse for ortho [facility]  [phone]

## 2023-03-17 ENCOUNTER — HOSPITAL ENCOUNTER (OUTPATIENT)
Dept: PHYSICAL THERAPY | Age: 68
Setting detail: THERAPIES SERIES
Discharge: HOME | End: 2023-03-17
Payer: MEDICARE

## 2023-03-17 PROCEDURE — 97016 VASOPNEUMATIC DEVICE THERAPY: CPT | Performed by: PHYSICAL THERAPIST

## 2023-03-17 PROCEDURE — 97140 MANUAL THERAPY 1/> REGIONS: CPT | Performed by: PHYSICAL THERAPIST

## 2023-03-17 PROCEDURE — 97110 THERAPEUTIC EXERCISES: CPT | Performed by: PHYSICAL THERAPIST

## 2023-03-17 NOTE — FLOWSHEET NOTE
[facility], [address] [place]  Phone: [phone]  Fax [phone]    Physical Therapy Treatment Note/ Progress Report:           Date:  3/17/2023    Patient Name:  Ilana Lopez    :  1955  MRN: [id number]  Restrictions/Precautions:    Medical/Treatment Diagnosis Information:  H98.820 (ICD-10-CM) - S/P total knee arthroplasty, right   DOS:   3/6/23  Right knee OA M17.11,  right knee pain G52.306  Insurance/Certification information:  [facility]  Physician Information:  Rose Ocasio MD  Has the plan of care been signed (Y/N):        []  Yes  [x]  No     Date of Patient follow up with Physician: 3/23/23      Is this a Progress Report:     []  Yes  [x]  No        If Yes:  Date Range for reporting period:  Beginning: 3/8/23  Ending:     Progress report will be due (10 Rx or 30 days whichever is less):        Recertification will be due (POC Duration  / 90 days whichever is less):       Visit # Insurance Allowable Auth Required   In-person 4 bmn []  Yes [x]  No    Telehealth   []  Yes []  No    Total        Therapy Diagnosis/Practice Pattern:H      Number of Comorbidities:  []0     [x]1-2    []3+    Latex Allergy:  [x]NO      []YES  Preferred Language for Healthcare:   [x]English       []other:    SUBJECTIVE: Pt reports pain a little less. Pt is still having disrupted sleep. Knee was sore when rolling in bed. She had a sharp medial knee pain. OBJECTIVE: Pain pump removed in clinic secondary to malfunction of pump. Wound care provided.     Observation:    Test measurements:    ROM RIGHT  3/23/23 Current    Knee ext 0 0   Knee Flex 80 110   Ankle DF 5    Strength  RIGHT    Quad set Fair good   SLR Able to do 10 without lag Able to perform 2x10 without lag          Circumference                   Pain Scale 3-8/10 5/10   LEFS 14 =83%       RESTRICTIONS/PRECAUTIONS: both knees are replaced. Exercises/Interventions:     Therapeutic Ex (21434) Sets/sec Reps Notes/CUES   Recumbent Bike 5 MIN     QS :10 X 15 HEP   SAQ  2 x 10     SLR  2 x 10 HEP   SL hip abd   Seated hs stretch 20 sec 3 HEP   Therapist assist for positioning of R knee            LAQ  2 x 10    HR/TL  X 10       Incline s :30 3x    Mini squats  X 15                       Manual Intervention (82696)      Supine PROM R knee flexion/ext  5 min    Pat mobs  2 min    Man HS  man ITB    2 min                NMR re-education (17682)   CUES NEEDED         tandem  nv                                        Therapeutic Activity (38910)                                             Access Code: [email or web address]  URL: [email or web address]  Date: 75/55/2402  Prepared by: Dia Cassidy    Exercises  Long Sitting Calf Stretch with Strap - 2 x daily - 7 x weekly - 3-5 reps - 30 seconds hold  Sitting Heel Slide with Towel - 2 x daily - 7 x weekly - 10 reps - 15 seconds hold  Seated Table Hamstring Stretch - 2 x daily - 7 x weekly - 5 reps - 30 seconds hold  Long Sitting Quad Set - 2 x daily - 7 x weekly - 10 reps - 10 seconds hold  Straight Leg Raise - 2 x daily - 7 x weekly - 3 sets - 10 reps - 1 hold  Supine Ankle Pumps - 2 x daily - 7 x weekly - 3 sets - 10 reps      Therapeutic Exercise and NMR EXR  [x] (63148) Provided verbal/tactile cueing for activities related to strengthening, flexibility, endurance, ROM for improvements in LE, proximal hip, and core control with self care, mobility, lifting, ambulation.  [] (13823) Provided verbal/tactile cueing for activities related to improving balance, coordination, kinesthetic sense, posture, motor skill, proprioception  to assist with LE, proximal hip, and core control in self care, mobility, lifting, ambulation and eccentric single leg control.      NMR and Therapeutic Activities:    [] (64624 or ) Provided verbal/tactile cueing for activities related to improving balance, coordination, kinesthetic sense, posture, motor skill, proprioception and motor activation to allow for proper function of core, proximal hip and LE with self care and ADLs  [] (79667) Gait Re-education- Provided training and instruction to the patient for proper LE, core and proximal hip recruitment and positioning and eccentric body weight control with ambulation re-education including up and down stairs     Home Exercise Program:    [x] (80910) Reviewed/Progressed HEP activities related to strengthening, flexibility, endurance, ROM of core, proximal hip and LE for functional self-care, mobility, lifting and ambulation/stair navigation   [] (66276)Reviewed/Progressed HEP activities related to improving balance, coordination, kinesthetic sense, posture, motor skill, proprioception of core, proximal hip and LE for self care, mobility, lifting, and ambulation/stair navigation      Manual Treatments:  PROM / STM / Oscillations-Mobs:  G-I, II, III, IV (PA's, Inf., Post.)  [x] (01713) Provided manual therapy to mobilize LE, proximal hip and/or LS spine soft tissue/joints for the purpose of modulating pain, promoting relaxation,  increasing ROM, reducing/eliminating soft tissue swelling/inflammation/restriction, improving soft tissue extensibility and allowing for proper ROM for normal function with self care, mobility, lifting and ambulation. Modalities:     [x] GAME READY (VASO)- for significant edema, swelling, pain control.  15 min    Charges  Timed Code Treatment Minutes: 45   Total Treatment Minutes: 60     Medicare total (add KX at $2000)  417.10   97.10    [] EVAL (LOW) 20144   [] EVAL (MOD) 01063  [] EVAL (HIGH) 32247   [] RE-EVAL     [x] SH(24811) x  2  [] IONTO  [] NMR (80708) x     [x] VASO  [x] Manual (79206) x 1   [] Other:  [] TA x      [] Mech Traction (29282)  [] ES(attended) (33911)      [] ES (un) (22908):       GOALS:   Patient stated goal: walking without pain  [] Progressing: [] Met: [] Not Met: [] Adjusted    Therapist goals for Patient:   Short Term Goals: To be achieved in: 2 weeks  1. Independent in HEP and progression per patient tolerance, in order to prevent re-injury. [x] Progressing: [] Met: [] Not Met: [] Adjusted  2. Patient will have a decrease in pain to facilitate improvement in movement, function, and ADLs as indicated by Functional Deficits. [] Progressing: [] Met: [] Not Met: [] Adjusted    Long Term Goals: To be achieved in: 12 weeks  1. Disability index score of 35% or less per LEFS to assist with reaching prior level of function. [] Progressing: [] Met: [] Not Met: [] Adjusted  2. Patient will demonstrate increased AROM of right knee from 0 - 125 to allow for proper joint functioning as indicated by patients Functional Deficits. [x] Progressing: [] Met: [] Not Met: [] Adjusted  3. Patient will demonstrate an increase in Strength to good proximal hip strength and control, within 5lb HHD in LE to allow for proper functional mobility as indicated by patients Functional Deficits. [] Progressing: [] Met: [] Not Met: [] Adjusted  4. Patient will return to ascending and descending a flight of stairs with reciprocal, symmetrical gait without increased symptoms or restriction. [] Progressing: [] Met: [] Not Met: [] Adjusted  5. Able to drive car 1 hour to get to and from apptments. [] Progressing: [] Met: [] Not Met: [] Adjusted   6. Able to sleep 7-8 hours without waking from knee  [] Progressing: [] Met: [] Not Met: [] Adjusted   7. Able to don and doff right sock and shoe without right knee restriction. [] Progressing: [] Met: [] Not Met: [] Adjusted     Progression Towards Functional goals:  [x] Patient is progressing as expected towards functional goals listed. [] Progression is slowed due to complexities listed. [] Progression has been slowed due to co-morbidities.   [] Plan just implemented, too soon to assess goals progression  [] Other:         Overall Progression Towards Functional goals/ Treatment Progress Update:  [] Patient is progressing as expected towards functional goals listed. [] Progression is slowed due to complexities/Impairments listed. [] Progression has been slowed due to co-morbidities. [x] Plan just implemented, too soon to assess goals progression <30days   [] Goals require adjustment due to lack of progress  [] Patient is not progressing as expected and requires additional follow up with physician  [] Other    Prognosis for POC: [x] Good [] Fair  [] Poor      Patient requires continued skilled intervention: [x] Yes  [] No    Treatment/Activity Tolerance:  [x] Patient able to complete treatment  [] Patient limited by fatigue  [x] Patient limited by pain    [] Patient limited by other medical complications  [] Other:     ASSESSMENT: Pt is making great strides in PROM. Pt is having better quad recruitment. Pt did get light headed during visit. Work on balance next visit. Pt may also be able to graduate to cane next week      PLAN: See eval  [x] Continue per plan of care [] Alter current plan (see comments above)  [] Plan of care initiated [] Hold pending MD visit [] Discharge      Electronically signed by:  Mario Menendez, PT      Note: If patient does not return for scheduled/ recommended follow up visits, this note will serve as a discharge from care along with most recent update on progress.

## 2023-03-20 ENCOUNTER — CARE COORDINATION (OUTPATIENT)
Dept: CASE MANAGEMENT | Age: 68
End: 2023-03-20

## 2023-03-20 ENCOUNTER — HOSPITAL ENCOUNTER (OUTPATIENT)
Dept: PHYSICAL THERAPY | Age: 68
Setting detail: THERAPIES SERIES
Discharge: HOME | End: 2023-03-20
Payer: MEDICARE

## 2023-03-20 PROCEDURE — 97140 MANUAL THERAPY 1/> REGIONS: CPT | Performed by: PHYSICAL THERAPIST

## 2023-03-20 PROCEDURE — 97110 THERAPEUTIC EXERCISES: CPT | Performed by: PHYSICAL THERAPIST

## 2023-03-20 PROCEDURE — 97016 VASOPNEUMATIC DEVICE THERAPY: CPT | Performed by: PHYSICAL THERAPIST

## 2023-03-20 NOTE — CARE COORDINATION
Spoke with patient. Incision status: States dressing changed Saturday and free from redness or drainage and dressing dry and intact. Edema/Swelling: States swelling is still not that much, continue to ice. Pain level and status: States pain is tolerable, more at night. Use of pain medications: States taking tylenol for pain relief. Bowels: No complaints. Outpatient therapy: Continues.     Do you have all of your medications: Yes    Changes in medications: No    Follow up appointments:    Future Appointments   Date Time Provider Cris Oates   3/23/2023 11:15 AM Tracy Becerra MD AND [facility]   3/23/2023 12:00 PM Mai Feliz, PT [address] [place]  Care Transition Nurse for ortho bundle  [phone]

## 2023-03-20 NOTE — FLOWSHEET NOTE
Activities:    [] (52993 or 00141) Provided verbal/tactile cueing for activities related to improving balance, coordination, kinesthetic sense, posture, motor skill, proprioception and motor activation to allow for proper function of core, proximal hip and LE with self care and ADLs  [] (10180) Gait Re-education- Provided training and instruction to the patient for proper LE, core and proximal hip recruitment and positioning and eccentric body weight control with ambulation re-education including up and down stairs     Home Exercise Program:    [x] (16587) Reviewed/Progressed HEP activities related to strengthening, flexibility, endurance, ROM of core, proximal hip and LE for functional self-care, mobility, lifting and ambulation/stair navigation   [] (28661)Reviewed/Progressed HEP activities related to improving balance, coordination, kinesthetic sense, posture, motor skill, proprioception of core, proximal hip and LE for self care, mobility, lifting, and ambulation/stair navigation      Manual Treatments:  PROM / STM / Oscillations-Mobs:  G-I, II, III, IV (PA's, Inf., Post.)  [x] (09247) Provided manual therapy to mobilize LE, proximal hip and/or LS spine soft tissue/joints for the purpose of modulating pain, promoting relaxation,  increasing ROM, reducing/eliminating soft tissue swelling/inflammation/restriction, improving soft tissue extensibility and allowing for proper ROM for normal function with self care, mobility, lifting and ambulation. Modalities:     [x] GAME READY (VASO)- for significant edema, swelling, pain control.  15 min    Charges  Timed Code Treatment Minutes: 45   Total Treatment Minutes: 60     Medicare total (add KX at $2000)  514.20   97.10    [] EVAL (LOW) 68104   [] EVAL (MOD) 42902  [] EVAL (HIGH) 58267   [] RE-EVAL     [x] YF(45744) x  2  [] IONTO  [] NMR (59776) x     [x] VASO  [x] Manual (70575) x 1   [] Other:  [] TA x      [] Mech Traction (47306)  [] ES(attended) (30190)      []

## 2023-03-23 ENCOUNTER — OFFICE VISIT (OUTPATIENT)
Dept: ORTHOPEDIC SURGERY | Age: 68
End: 2023-03-23
Payer: MEDICARE

## 2023-03-23 ENCOUNTER — HOSPITAL ENCOUNTER (OUTPATIENT)
Dept: PHYSICAL THERAPY | Age: 68
Setting detail: THERAPIES SERIES
Discharge: HOME | End: 2023-03-23
Payer: MEDICARE

## 2023-03-23 VITALS — HEIGHT: 64 IN | WEIGHT: 184 LBS | BODY MASS INDEX: 31.41 KG/M2

## 2023-03-23 DIAGNOSIS — M70.61 TROCHANTERIC BURSITIS OF RIGHT HIP: ICD-10-CM

## 2023-03-23 DIAGNOSIS — Z96.651 S/P TKR (TOTAL KNEE REPLACEMENT) USING CEMENT, RIGHT: Primary | ICD-10-CM

## 2023-03-23 PROCEDURE — 3017F COLORECTAL CA SCREEN DOC REV: CPT | Performed by: PHYSICIAN ASSISTANT

## 2023-03-23 PROCEDURE — G8417 CALC BMI ABV UP PARAM F/U: HCPCS | Performed by: PHYSICIAN ASSISTANT

## 2023-03-23 PROCEDURE — 97140 MANUAL THERAPY 1/> REGIONS: CPT | Performed by: PHYSICAL THERAPIST

## 2023-03-23 PROCEDURE — 1123F ACP DISCUSS/DSCN MKR DOCD: CPT | Performed by: PHYSICIAN ASSISTANT

## 2023-03-23 PROCEDURE — G8427 DOCREV CUR MEDS BY ELIG CLIN: HCPCS | Performed by: PHYSICIAN ASSISTANT

## 2023-03-23 PROCEDURE — 97110 THERAPEUTIC EXERCISES: CPT | Performed by: PHYSICAL THERAPIST

## 2023-03-23 PROCEDURE — G8399 PT W/DXA RESULTS DOCUMENT: HCPCS | Performed by: PHYSICIAN ASSISTANT

## 2023-03-23 PROCEDURE — 1090F PRES/ABSN URINE INCON ASSESS: CPT | Performed by: PHYSICIAN ASSISTANT

## 2023-03-23 PROCEDURE — 1036F TOBACCO NON-USER: CPT | Performed by: PHYSICIAN ASSISTANT

## 2023-03-23 PROCEDURE — 99213 OFFICE O/P EST LOW 20 MIN: CPT | Performed by: PHYSICIAN ASSISTANT

## 2023-03-23 PROCEDURE — G8484 FLU IMMUNIZE NO ADMIN: HCPCS | Performed by: PHYSICIAN ASSISTANT

## 2023-03-23 NOTE — FLOWSHEET NOTE
RESTRICTIONS/PRECAUTIONS: both knees are replaced. Exercises/Interventions:     Therapeutic Ex (43784) Sets/sec Reps Notes/CUES   Recumbent Bike 5 MIN     QS :10 X 15 HEP   SAQ :5 2 x 10     SLR  10x,12x HEP: mild discomfort in R hip   Clamshells. 2 x 15  Seated hs stretch 20 sec 3 HEP   Piriformis s :20 3x    Leg press  Nv? Knee ext hang     LAQ  2 x 10    HR/TL  X 20       Incline s :30 3x    Mini squats  X 15     BKFO  X 10    LTR s  X 10    Supine ITB/ HS s with strap    Manual Intervention (59792)      Supine PROM R knee flexion/ext  4 min    Pat mobs  2 min    Man HS  man ITB    2 min    Supine hip flex s           NMR re-education (86310)   CUES NEEDED         Gait training with SPC  X 3 min  SPC placement, purpose of SPC   tandem :30 2                                         Therapeutic Activity (21800)                                             Access Code: [email or web address]  URL: [email or web address]  Date: 28/09/6874  Prepared by: Vazquez Lanier    Exercises  Long Sitting Calf Stretch with Strap - 2 x daily - 7 x weekly - 3-5 reps - 30 seconds hold  Sitting Heel Slide with Towel - 2 x daily - 7 x weekly - 10 reps - 15 seconds hold  Seated Table Hamstring Stretch - 2 x daily - 7 x weekly - 5 reps - 30 seconds hold  Long Sitting Quad Set - 2 x daily - 7 x weekly - 10 reps - 10 seconds hold  Straight Leg Raise - 2 x daily - 7 x weekly - 3 sets - 10 reps - 1 hold  Supine Ankle Pumps - 2 x daily - 7 x weekly - 3 sets - 10 reps      Therapeutic Exercise and NMR EXR  [x] (65780) Provided verbal/tactile cueing for activities related to strengthening, flexibility, endurance, ROM for improvements in LE, proximal hip, and core control with self care, mobility, lifting, ambulation.  [] (87501) Provided verbal/tactile cueing for activities related to improving balance, coordination, kinesthetic sense, posture, motor skill, proprioception  to assist with LE, proximal hip, and core control in self care,

## 2023-03-23 NOTE — PROGRESS NOTES
Dr Kaitlyn Fisher      Date /Time 3/23/2023       11:59 AM EDT  Name Leona Mann             1955   Location  [facility]  MRN [id number]                Chief Complaint   Patient presents with    Follow-up     1st PO Right TKA 3/06/2023        History of Present Illness    Leona Mann is a 80 y.o. female who presents with  right hip and knee pain. Patient is here for follow-up visit concerning her right total knee arthroplasty. Surgery was performed on 3/6/2023. Overall she is doing fairly well. She does complain of bruising over the anterior portion of her shin. Pain controlled. She denies any fever chills or drainage. Patient has a new complaint today of right lateral hip pain. She states that she has had bursitis in the past.  She has no groin pain. She denies any lumbar pain or radicular symptoms. She does not recall any specific injury or trauma.     Past History  Past Medical History:   Diagnosis Date    Hyperlipidemia     Menopause     Menopause     OA (osteoarthritis)     PONV (postoperative nausea and vomiting)      (spontaneous vaginal delivery)          Past Surgical History:   Procedure Laterality Date    COLONOSCOPY  02/15/2001, 2006, 10/1/12    TOTAL KNEE ARTHROPLASTY Left 2022    LEFT TOTAL KNEE ARTHROPLASTY WITH ADDUCTOR CANAL BLOCK FOR PAIN CONTROL                  [place] performed by Kaitlyn Fisher MD at [address] Right 3/6/2023    RIGHT TOTAL KNEE ARTHROPLASTY  performed by Kaitlyn Fisher MD at [address] [place] [address]       Family History   Problem Relation Age of Onset    Cancer Mother         colon cancer/cervical cancer    Cataracts Mother     High Cholesterol Father     Cataracts Father     Thyroid Disease Sister     Cancer Brother         anal cancer    Thyroid Disease Niece     Breast Cancer Niece 52     Social History     Tobacco Use    Smoking status: Never    Smokeless tobacco: Never   Substance

## 2023-03-24 RX ORDER — MELOXICAM 15 MG/1
TABLET ORAL
Qty: 30 TABLET | Refills: 0 | Status: SHIPPED | OUTPATIENT
Start: 2023-03-24

## 2023-03-27 ENCOUNTER — HOSPITAL ENCOUNTER (OUTPATIENT)
Dept: PHYSICAL THERAPY | Age: 68
Setting detail: THERAPIES SERIES
Discharge: HOME | End: 2023-03-27
Payer: MEDICARE

## 2023-03-27 ENCOUNTER — CARE COORDINATION (OUTPATIENT)
Dept: CASE MANAGEMENT | Age: 68
End: 2023-03-27

## 2023-03-27 PROCEDURE — 97140 MANUAL THERAPY 1/> REGIONS: CPT | Performed by: PHYSICAL THERAPIST

## 2023-03-27 PROCEDURE — 97110 THERAPEUTIC EXERCISES: CPT | Performed by: PHYSICAL THERAPIST

## 2023-03-27 PROCEDURE — 97016 VASOPNEUMATIC DEVICE THERAPY: CPT | Performed by: PHYSICAL THERAPIST

## 2023-03-27 NOTE — CARE COORDINATION
Spoke with patient. Incision status: States free from redness or drainage. Edema/Swelling: States swelling has improved. Pain level and status: States pain is tolerable. States having bursitis in right hip and makes it difficult to sleep at night. Use of pain medications: States taking muscle relaxer at bedtime to help with the pain. Bowels: No complaints. Outpatient therapy: Continues.     Do you have all of your medications: Yes    Changes in medications: No    Follow up appointments:    Future Appointments   Date Time Provider Cris Oates   9/13/4231  8:88 PM Anna Patel AND PT Talisha Cardoso   4/41/8516 65:29 AM GELA Patel AND  [facility]   7/1/7465 50:83 PM GELA Patel AND PT Talisha Cardoso   0/3/0836 16:36 AM Chao Morales, PT [facility] AND PT Talisha Cardoso   3/73/9062 12:60 AM GELA Patel AND PT Talisha Cardoso   4/13/2023  9:45 AM Clark Wallace MD AND ORTH[facility]   4/13/2023 10:30 AM Alphonso Price PT [address] [place]  Care Transition Nurse for ortho bundle  [phone]

## 2023-03-27 NOTE — FLOWSHEET NOTE
co-morbidities. [] Plan just implemented, too soon to assess goals progression  [] Other:         Overall Progression Towards Functional goals/ Treatment Progress Update:  [x] Patient is progressing as expected towards functional goals listed. [] Progression is slowed due to complexities/Impairments listed. [] Progression has been slowed due to co-morbidities. [] Plan just implemented, too soon to assess goals progression <30days   [] Goals require adjustment due to lack of progress  [] Patient is not progressing as expected and requires additional follow up with physician  [] Other    Prognosis for POC: [x] Good [] Fair  [] Poor      Patient requires continued skilled intervention: [x] Yes  [] No    Treatment/Activity Tolerance:  [x] Patient able to complete treatment  [] Patient limited by fatigue  [] Patient limited by pain    [] Patient limited by other medical complications  [] Other:     ASSESSMENT:   Pt is making progress towards goals. Pt was mostly challenged by balance activities today. May tolerate weight machines for more aggressive strength building. PLAN: See eval  [x] Continue per plan of care [] Alter current plan (see comments above)  [] Plan of care initiated [] Hold pending MD visit [] Discharge      Electronically signed by:  Marixa Middleton PT          Note: If patient does not return for scheduled/ recommended follow up visits, this note will serve as a discharge from care along with most recent update on progress.

## 2023-03-31 ENCOUNTER — HOSPITAL ENCOUNTER (OUTPATIENT)
Dept: PHYSICAL THERAPY | Age: 68
Setting detail: THERAPIES SERIES
Discharge: HOME | End: 2023-03-31
Payer: MEDICARE

## 2023-03-31 PROCEDURE — 97110 THERAPEUTIC EXERCISES: CPT | Performed by: PHYSICAL THERAPIST

## 2023-03-31 PROCEDURE — 97140 MANUAL THERAPY 1/> REGIONS: CPT | Performed by: PHYSICAL THERAPIST

## 2023-03-31 PROCEDURE — 97016 VASOPNEUMATIC DEVICE THERAPY: CPT | Performed by: PHYSICAL THERAPIST

## 2023-03-31 NOTE — FLOWSHEET NOTE
too soon to assess goals progression  [] Other:         Overall Progression Towards Functional goals/ Treatment Progress Update:  [] Patient is progressing as expected towards functional goals listed. [] Progression is slowed due to complexities/Impairments listed. [] Progression has been slowed due to co-morbidities. [x] Plan just implemented, too soon to assess goals progression <30days   [] Goals require adjustment due to lack of progress  [] Patient is not progressing as expected and requires additional follow up with physician  [] Other    Prognosis for POC: [x] Good [] Fair  [] Poor      Patient requires continued skilled intervention: [x] Yes  [] No    Treatment/Activity Tolerance:  [x] Patient able to complete treatment  [] Patient limited by fatigue  [x] Patient limited by pain    [] Patient limited by other medical complications  [] Other:     ASSESSMENT:  cont to amb with cane. Pt tolerated increased hip strengthening. Pt does have slight flexed knee gait. Focus on more knee ext NV      PLAN: See eval  [x] Continue per plan of care [] Alter current plan (see comments above)  [] Plan of care initiated [] Hold pending MD visit [] Discharge      Electronically signed by:  George Govea, PT      Note: If patient does not return for scheduled/ recommended follow up visits, this note will serve as a discharge from care along with most recent update on progress.

## 2023-04-03 ENCOUNTER — CARE COORDINATION (OUTPATIENT)
Dept: CASE MANAGEMENT | Age: 68
End: 2023-04-03

## 2023-04-03 ENCOUNTER — HOSPITAL ENCOUNTER (OUTPATIENT)
Dept: PHYSICAL THERAPY | Age: 68
Setting detail: THERAPIES SERIES
Discharge: HOME | End: 2023-04-03
Payer: MEDICARE

## 2023-04-03 PROCEDURE — 97140 MANUAL THERAPY 1/> REGIONS: CPT | Performed by: PHYSICAL THERAPIST

## 2023-04-03 PROCEDURE — 97110 THERAPEUTIC EXERCISES: CPT | Performed by: PHYSICAL THERAPIST

## 2023-04-03 NOTE — FLOWSHEET NOTE
GOALS:   Patient stated goal: walking without pain  [] Progressing: [] Met: [] Not Met: [] Adjusted    Therapist goals for Patient:   Short Term Goals: To be achieved in: 2 weeks  1. Independent in HEP and progression per patient tolerance, in order to prevent re-injury. [x] Progressing: [] Met: [] Not Met: [] Adjusted  2. Patient will have a decrease in pain to facilitate improvement in movement, function, and ADLs as indicated by Functional Deficits. [] Progressing: [] Met: [] Not Met: [] Adjusted    Long Term Goals: To be achieved in: 12 weeks  1. Disability index score of 35% or less per LEFS to assist with reaching prior level of function. [] Progressing: [] Met: [] Not Met: [] Adjusted  2. Patient will demonstrate increased AROM of right knee from 0 - 125 to allow for proper joint functioning as indicated by patients Functional Deficits. [x] Progressing: [] Met: [] Not Met: [] Adjusted  3. Patient will demonstrate an increase in Strength to good proximal hip strength and control, within 5lb HHD in LE to allow for proper functional mobility as indicated by patients Functional Deficits. [] Progressing: [] Met: [] Not Met: [] Adjusted  4. Patient will return to ascending and descending a flight of stairs with reciprocal, symmetrical gait without increased symptoms or restriction. [] Progressing: [] Met: [] Not Met: [] Adjusted  5. Able to drive car 1 hour to get to and from apptments. [] Progressing: [] Met: [] Not Met: [] Adjusted   6. Able to sleep 7-8 hours without waking from knee  [] Progressing: [] Met: [] Not Met: [] Adjusted   7. Able to don and doff right sock and shoe without right knee restriction. [] Progressing: [] Met: [] Not Met: [] Adjusted     Progression Towards Functional goals:  [x] Patient is progressing as expected towards functional goals listed. [] Progression is slowed due to complexities listed. [] Progression has been slowed due to co-morbidities.   [] Plan just

## 2023-04-03 NOTE — CARE COORDINATION
Spoke with patient. Incision status: States free from redness or drainage. Edema/Swelling: States swelling has improved. States last range of motion, flexion is 120 degrees. Pain level and status: States pain has decreased. States still having bursitis pain in hip at night. Use of pain medications: States continues with muscle relaxer at night. Bowels: No complaints. Outpatient therapy: Continues.     Do you have all of your medications: Yes    Changes in medications: No    Follow up appointments:    Future Appointments   Date Time Provider Cris Oates   1/1/2807 22:18 AM Kelle Cullen PT [facility] AND PT Louann POOLE   6/21/1991 97:86 AM Kelle Cullen PT [facility] AND PT Nona Grant   4/13/2023  9:45 AM Mundo Nichols MD AND ORTHO [facility]   4/13/2023 10:30 AM Mikki Hart, PT [address] [place]  Care Transition Nurse for ortho bundle  [phone]

## 2023-04-07 ENCOUNTER — HOSPITAL ENCOUNTER (OUTPATIENT)
Dept: PHYSICAL THERAPY | Age: 68
Setting detail: THERAPIES SERIES
Discharge: HOME | End: 2023-04-07
Payer: MEDICARE

## 2023-04-07 PROCEDURE — 97110 THERAPEUTIC EXERCISES: CPT | Performed by: PHYSICAL THERAPIST

## 2023-04-07 PROCEDURE — 97140 MANUAL THERAPY 1/> REGIONS: CPT | Performed by: PHYSICAL THERAPIST

## 2023-04-07 NOTE — FLOWSHEET NOTE
Electronically signed by:  Aj Flynn PT    If you have any questions or concerns, please don't hesitate to call. Thank you for the referral.  Medicare requires recertification of the therapy plan of care. Additional visits are being requested. Please recertify for additional visits:    Physician Signature:____________________________________Date:_______________  By signing above, therapist's plan is approved by the physician. Date:  2023    Patient Name:  Missy Espinosa    :  1955  MRN: [id number]  Restrictions/Precautions:    Medical/Treatment Diagnosis Information:  U92.208 (ICD-10-CM) - S/P total knee arthroplasty, right   DOS:   3/6/23  Right knee OA M17.11,  right knee pain O36.446  Insurance/Certification information:  [facility]  Physician Information:  Lila Garcia MD  Has the plan of care been signed (Y/N):        []  Yes  [x]  No     Date of Patient follow up with Physician: 23      Is this a Progress Report:     [x]  Yes  []  No        If Yes:  Date Range for reporting period:  Beginning: 3/8/23  Endin23     Visit # Insurance Allowable Auth Required   In-person 7 bmn []  Yes [x]  No    Telehealth   []  Yes []  No    Total        Therapy Diagnosis/Practice Pattern:H      Number of Comorbidities:  []0     [x]1-2    []3+    Latex Allergy:  [x]NO      []YES  Preferred Language for Healthcare:   [x]English       []other:    SUBJECTIVE: Pt has LBP today as she was painting a table. Pt is amb with cane. OBJECTIVE: Pain pump removed in clinic secondary to malfunction of pump. Wound care provided.     Observation:    Test measurements:    ROM RIGHT  3/23/23 Current    Knee ext 0 0   Knee Flex 80 110   Ankle DF 5    Strength  RIGHT    Quad set Fair good   SLR Able to do 10 without lag Able to perform 2x10 without lag          Circumference                   Pain Scale 3-8/10 5/10   LEFS 14 =83% 44 =45%

## 2023-04-17 ENCOUNTER — HOSPITAL ENCOUNTER (OUTPATIENT)
Dept: PHYSICAL THERAPY | Age: 68
Setting detail: THERAPIES SERIES
Discharge: HOME | End: 2023-04-17
Payer: MEDICARE

## 2023-04-17 PROCEDURE — 97110 THERAPEUTIC EXERCISES: CPT | Performed by: PHYSICAL THERAPIST

## 2023-04-17 PROCEDURE — 97140 MANUAL THERAPY 1/> REGIONS: CPT | Performed by: PHYSICAL THERAPIST

## 2023-04-17 NOTE — FLOWSHEET NOTE
[facility],  [address]  Phone: [phone]  Fax [phone]    Physical Therapy Treatment Note/ Progress Report:       Date:  2023    Patient Name:  Vargas Diaz    :  1955  MRN: [id number]  Restrictions/Precautions:    Medical/Treatment Diagnosis Information:  Q96.210 (ICD-10-CM) - S/P total knee arthroplasty, right   DOS:   3/6/23  Right knee OA M17.11,  right knee pain A59.084  Insurance/Certification information:  PT Insurance Information: / [facility]  Physician Information:  Toño Cordova MD  Has the plan of care been signed (Y/N):        []  Yes  [x]  No     Date of Patient follow up with Physician: 23      Is this a Progress Report:     []  Yes  [x]  No        If Yes:  Date Range for reporting period:  Beginning: 3/8/23  Endin23     Visit # Insurance Allowable Auth Required   In-person 10 bmn []  Yes [x]  No    Telehealth   []  Yes []  No    Total        Therapy Diagnosis/Practice Pattern:H      Number of Comorbidities:  []0     [x]1-2    []3+    Latex Allergy:  [x]NO      []YES  Preferred Language for Healthcare:   [x]English       []other:    SUBJECTIVE:   The sciatic pain on her left side has resolved. She is still experiencing bursa pain on the right hip. Knees feel stiff in the AM.  Pt has been cleaning more at home, and she has been getting back to indep errands. OBJECTIVE:   Observation:    Test measurements:    ROM RIGHT  3/23/23 Current    Knee ext 0 0   Knee Flex 80 121   Ankle DF 5    Strength  RIGHT    Quad set Fair good   SLR Able to do 10 without lag Able to perform 2x10 without lag          Circumference                   Pain Scale 3-810 2-6 /10   LEFS 14 =83% 44 =45%      RESTRICTIONS/PRECAUTIONS: both knees are replaced.     Exercises/Interventions:     Therapeutic Ex (99512) Sets/sec Reps Notes/CUES   Recumbent Bike 5 MIN     Knee ext hang nv    HEP

## 2023-04-18 ENCOUNTER — CARE COORDINATION (OUTPATIENT)
Dept: CASE MANAGEMENT | Age: 68
End: 2023-04-18

## 2023-04-18 NOTE — CARE COORDINATION
Called patient for follow up with  [facility]. Left message for return call.   Michelle BLANCON  Care Transition Nurse for ortho bundle  [phone]

## 2023-04-20 RX ORDER — MELOXICAM 15 MG/1
TABLET ORAL
Qty: 30 TABLET | Refills: 0 | Status: SHIPPED | OUTPATIENT
Start: 2023-04-20

## 2023-04-21 ENCOUNTER — HOSPITAL ENCOUNTER (OUTPATIENT)
Dept: PHYSICAL THERAPY | Age: 68
Setting detail: THERAPIES SERIES
Discharge: HOME | End: 2023-04-21
Payer: MEDICARE

## 2023-04-21 PROCEDURE — 97140 MANUAL THERAPY 1/> REGIONS: CPT | Performed by: PHYSICAL THERAPIST

## 2023-04-21 PROCEDURE — 97110 THERAPEUTIC EXERCISES: CPT | Performed by: PHYSICAL THERAPIST

## 2023-04-25 ENCOUNTER — HOSPITAL ENCOUNTER (OUTPATIENT)
Dept: PHYSICAL THERAPY | Age: 68
Setting detail: THERAPIES SERIES
Discharge: HOME | End: 2023-04-25
Payer: MEDICARE

## 2023-04-25 PROCEDURE — 97110 THERAPEUTIC EXERCISES: CPT | Performed by: PHYSICAL THERAPIST

## 2023-04-25 PROCEDURE — 97140 MANUAL THERAPY 1/> REGIONS: CPT | Performed by: PHYSICAL THERAPIST

## 2023-04-25 NOTE — FLOWSHEET NOTE
[facility],  [address]  Phone: [phone]  Fax [phone]    Physical Therapy Treatment Note/ Progress Report:       Date:  2023    Patient Name:  Teresa Lopez    :  1955  MRN: [id number]  Restrictions/Precautions:    Medical/Treatment Diagnosis Information:  N36.264 (ICD-10-CM) - S/P total knee arthroplasty, right   DOS:   3/6/23  Right knee OA M17.11,  right knee pain G45.511  Insurance/Certification information:  [facility]  Physician Information:  Kiki Grider MD  Has the plan of care been signed (Y/N):        []  Yes  [x]  No     Date of Patient follow up with Physician: 23      Is this a Progress Report:     []  Yes  [x]  No        If Yes:  Date Range for reporting period:  Beginning: 3/8/23  Endin23     Visit # Insurance Allowable Auth Required   In-person 12 bmn []  Yes [x]  No    Telehealth   []  Yes []  No    Total        Therapy Diagnosis/Practice Pattern:H      Number of Comorbidities:  []0     [x]1-2    []3+    Latex Allergy:  [x]NO      []YES  Preferred Language for Healthcare:   [x]English       []other:    SUBJECTIVE:   Pt has been busy preparing for [employer].   Pt reports fatigue, but knees are doing well. OBJECTIVE:   Observation:    Test measurements:    ROM RIGHT  3/23/23 Current    Knee ext 0 0   Knee Flex 80 121   Ankle DF 5    Strength  RIGHT    Quad set Fair good   SLR Able to do 10 without lag Able to perform 2x10 without lag          Circumference                   Pain Scale 3-8/10 2-6 /10   LEFS 14 =83% 44 =45%      RESTRICTIONS/PRECAUTIONS: both knees are replaced.     Exercises/Interventions:     Therapeutic Ex (11933) Sets/sec Reps Notes/CUES   Recumbent Bike 5 MIN     Knee ext hang nv    HEP   SLR  2 x 10 HEP   clamshells orange X 20        Modified Fig 4 s right/       Bridges with Hip abd OVL 2 x 10    Seated hs

## 2023-04-28 ENCOUNTER — HOSPITAL ENCOUNTER (OUTPATIENT)
Dept: PHYSICAL THERAPY | Age: 68
Setting detail: THERAPIES SERIES
Discharge: HOME | End: 2023-04-28
Payer: MEDICARE

## 2023-04-28 PROCEDURE — 97110 THERAPEUTIC EXERCISES: CPT | Performed by: PHYSICAL THERAPIST

## 2023-04-28 PROCEDURE — 97140 MANUAL THERAPY 1/> REGIONS: CPT | Performed by: PHYSICAL THERAPIST

## 2023-04-28 NOTE — FLOWSHEET NOTE
[facility],  [address] [place]  Phone: [phone]  Fax [phone]    Physical Therapy Treatment Note/ Progress Report:       Date:  2023    Patient Name:  Missy Espinosa    :  1955  MRN: [id number]  Restrictions/Precautions:    Medical/Treatment Diagnosis Information:  W40.930 (ICD-10-CM) - S/P total knee arthroplasty, right   DOS:   3/6/23  Right knee OA M17.11,  right knee pain Y91.218  Insurance/Certification information:  [facility]  Physician Information:  Lila Garcia MD  Has the plan of care been signed (Y/N):        []  Yes  [x]  No     Date of Patient follow up with Physician: 23      Is this a Progress Report:     []  Yes  [x]  No        If Yes:  Date Range for reporting period:  Beginning: 3/8/23  Endin23     Visit # Insurance Allowable Auth Required   In-person 13 bmn []  Yes [x]  No    Telehealth   []  Yes []  No    Total        Therapy Diagnosis/Practice Pattern:H      Number of Comorbidities:  []0     [x]1-2    []3+    Latex Allergy:  [x]NO      []YES  Preferred Language for Healthcare:   [x]English       []other:    SUBJECTIVE:   Pt feels good. Pt will be going to [facility] and then traveling to [place]    OBJECTIVE:   Observation:    Test measurements:    ROM RIGHT  3/23/23 Current    Knee ext 0 0   Knee Flex 80 121   Ankle DF 5    Strength  RIGHT    Quad set Fair good   SLR Able to do 10 without lag Able to perform 2x10 without lag          Circumference                   Pain Scale 3-8/10 2-6 /10   LEFS 14 =83% 44 =45%      RESTRICTIONS/PRECAUTIONS: both knees are replaced.     Exercises/Interventions:     Therapeutic Ex (16931) Sets/sec Reps Notes/CUES   Recumbent Bike 5 MIN        HEP   SLR  2 x 10 HEP   clamshells orange X 20        Modified Fig 4 s right/       Bridges with Hip abd OVL 2 x 10    Seated hs stretch 20 sec 3 HEP   [facility]  Hip abd

## 2023-05-03 ENCOUNTER — CARE COORDINATION (OUTPATIENT)
Dept: CASE MANAGEMENT | Age: 68
End: 2023-05-03

## 2023-05-03 NOTE — CARE COORDINATION
Spoke with patient. Incision status: States free from redness or drainage. Edema/Swelling: States no swelling present. Pain level and status: States pain is tolerable. Bowels: No complaints. Outpatient therapy: States therapy has gone well, and has one more visit in a few weeks. Doing HEP.     Do you have all of your medications: Yes    Changes in medications: No    Follow up appointments:    Future Appointments   Date Time Provider Cris Oates   5/19/7701 16:33 AM Halie Kelly,  [facility]   6/13/2023  9:15 AM Mirela Eduardo MD AND ORTHO [facility]     Cat Allen BSN  Care Transition Nurse for ortho bundle  [phone]

## 2023-05-16 ENCOUNTER — CARE COORDINATION (OUTPATIENT)
Dept: CASE MANAGEMENT | Age: 68
End: 2023-05-16

## 2023-05-16 NOTE — CARE COORDINATION
Called patient for follow up with  [facility] program. [place]. States no complications and getting around well. Bundle program ends 6/4/23. Instructed to call for any complications/concerns.   Gisell Smith BSN  Care Transition Nurse for ortho bundle  [phone]

## 2023-05-18 RX ORDER — MELOXICAM 15 MG/1
TABLET ORAL
Qty: 30 TABLET | Refills: 0 | Status: SHIPPED | OUTPATIENT
Start: 2023-05-18

## 2023-05-18 NOTE — TELEPHONE ENCOUNTER
Refill request for meloxicam medication.      Name of Pharmacy- [facility]    Last visit - 4/13/23     Pending visit - 6/13/23    Last refill -4/20/23    Additional Comments med pended

## 2023-05-30 ENCOUNTER — HOSPITAL ENCOUNTER (OUTPATIENT)
Dept: PHYSICAL THERAPY | Age: 68
Setting detail: THERAPIES SERIES
Discharge: HOME | End: 2023-05-30
Payer: MEDICARE

## 2023-05-30 PROCEDURE — 97110 THERAPEUTIC EXERCISES: CPT | Performed by: PHYSICAL THERAPIST

## 2023-05-30 PROCEDURE — 97140 MANUAL THERAPY 1/> REGIONS: CPT | Performed by: PHYSICAL THERAPIST

## 2023-05-30 NOTE — FLOWSHEET NOTE
[facility], [address]  Phone: [phone]  Fax [phone]    Physical Therapy Treatment Note/ Progress Report:       Date:  2023    Patient Name:  Cooper Waters    :  1955  MRN: [id number]  Restrictions/Precautions:    Medical/Treatment Diagnosis Information:  Y55.446 (ICD-10-CM) - S/P total knee arthroplasty, right   DOS:   3/6/23  Right knee OA M17.11,  right knee pain U74.175  Insurance/Certification information:  [facility]  Physician Information:  Jose C Kramer MD  Has the plan of care been signed (Y/N):        []  Yes  [x]  No     Date of Patient follow up with Physician: 23      Is this a Progress Report:     [x]  Yes  []  No        If Yes:  Date Range for reporting period:  Beginning: 3/8/23  Endin23     Visit # Insurance Allowable Auth Required   In-person 13 bmn []  Yes [x]  No    Telehealth   []  Yes []  No    Total        Therapy Diagnosis/Practice Pattern:H      Number of Comorbidities:  []0     [x]1-2    []3+    Latex Allergy:  [x]NO      []YES  Preferred Language for Healthcare:   [x]English       []other:    SUBJECTIVE:   Pt walks without restriction. She is more fearful on stairs. OBJECTIVE:   Observation:    Test measurements:    ROM RIGHT  3/23/23 Current    Knee ext 0 0   Knee Flex 80 124   Ankle DF 5    Strength  RIGHT    Quad set Fair good   SLR Able to do 10 without lag Able to perform 2x10 without lag          Circumference                   Pain Scale 3-8/10 0-2/10   LEFS 14 =83% 59 = 26%%      RESTRICTIONS/PRECAUTIONS: both knees are replaced.     Exercises/Interventions:     Therapeutic Ex (54837) Sets/sec Reps Notes/CUES   Recumbent Bike 5 min        HEP   SLR  2 x 10 HEP   clamshells orange X 20        Modified Fig 4 s right/       Bridges with Hip abd OVL 2 x 10    Seated hs stretch 20 sec 3 HEP   ADAM  Hip abd 30# 2 x 10

## 2023-06-20 ENCOUNTER — OFFICE VISIT (OUTPATIENT)
Dept: ORTHOPEDIC SURGERY | Age: 68
End: 2023-06-20
Payer: MEDICARE

## 2023-06-20 VITALS — BODY MASS INDEX: 31.41 KG/M2 | HEIGHT: 64 IN | WEIGHT: 184 LBS

## 2023-06-20 DIAGNOSIS — Z96.651 S/P TKR (TOTAL KNEE REPLACEMENT) USING CEMENT, RIGHT: Primary | ICD-10-CM

## 2023-06-20 PROCEDURE — G8417 CALC BMI ABV UP PARAM F/U: HCPCS | Performed by: PHYSICIAN ASSISTANT

## 2023-06-20 PROCEDURE — 99213 OFFICE O/P EST LOW 20 MIN: CPT | Performed by: PHYSICIAN ASSISTANT

## 2023-06-20 PROCEDURE — G8427 DOCREV CUR MEDS BY ELIG CLIN: HCPCS | Performed by: PHYSICIAN ASSISTANT

## 2023-06-20 PROCEDURE — 3017F COLORECTAL CA SCREEN DOC REV: CPT | Performed by: PHYSICIAN ASSISTANT

## 2023-06-20 PROCEDURE — 1036F TOBACCO NON-USER: CPT | Performed by: PHYSICIAN ASSISTANT

## 2023-06-20 PROCEDURE — 1090F PRES/ABSN URINE INCON ASSESS: CPT | Performed by: PHYSICIAN ASSISTANT

## 2023-06-20 PROCEDURE — 1123F ACP DISCUSS/DSCN MKR DOCD: CPT | Performed by: PHYSICIAN ASSISTANT

## 2023-06-20 PROCEDURE — G8399 PT W/DXA RESULTS DOCUMENT: HCPCS | Performed by: PHYSICIAN ASSISTANT

## 2023-06-20 RX ORDER — SULFAMETHOXAZOLE AND TRIMETHOPRIM 800; 160 MG/1; MG/1
1 TABLET ORAL 2 TIMES DAILY
Qty: 6 TABLET | Refills: 0 | Status: SHIPPED | OUTPATIENT
Start: 2023-06-20 | End: 2023-06-23

## 2023-06-20 NOTE — PROGRESS NOTES
Dr Rashmi Arredondo      Date /Time 6/20/2023       9:57 AM EDT  Name Nury Vallejo             1955   Location  [facility]  MRN [id number]                Chief Complaint   Patient presents with    Follow-up     F/U RIGHT TKA 3/6/23        History of Present Illness      Nury Vallejo is a 80 y.o. female is here for post-op visit after RIGHT  [address] Total Knee Arthroplasty    Patient presents today for follow-up visit. Patient is over 3 months status post right total knee arthroplasty. Patient doing well. Pain controlled. Patient denies any fever, chills, or drainage    Physical Exam    Based off 1997 Exam Criteria    Ht 5' 4\" (1.626 m)   Wt 184 lb (83.5 kg)   LMP  (LMP Unknown)   BMI 31.58 kg/m²      Constitutional:       General: He is not in acute distress. Appearance: Normal appearance. RIGHT Knee: incision clean, intact, healing appropriately. No surrounding  erythema or fluctuance. Neuro intact distal. No evidence of DVT. ROM: 0-120    Imaging       Right Knee: [facility]  Radiographs: X-rays were ordered and reviewed of the right knee. 3 views. AP, lateral, and skyline views. They demonstrate a right total knee arthroplasty in good position. No evidence of loosening or periprosthetic fracture. Assessment and Plan    Anita Gibson was seen today for follow-up. Diagnoses and all orders for this visit:    S/P TKR (total knee replacement) using cement, right  -     XR KNEE RIGHT (3 VIEWS); Future        Patient is doing well. With home exercises she has already learned in physical therapy her own at home. Patient will follow-up 1 year from surgery or sooner if problems arise. Discussed predental and preinvasive procedure antibiotics. I have sent to dental antibiotics to her pharmacy. I discussed with Nury Vallejo that her history, symptoms, signs, and imaging are most consistent with previous TKA replacement.     We reviewed the natural history of

## 2023-07-12 SDOH — ECONOMIC STABILITY: FOOD INSECURITY: WITHIN THE PAST 12 MONTHS, THE FOOD YOU BOUGHT JUST DIDN'T LAST AND YOU DIDN'T HAVE MONEY TO GET MORE.: NEVER TRUE

## 2023-07-12 SDOH — ECONOMIC STABILITY: HOUSING INSECURITY
IN THE LAST 12 MONTHS, WAS THERE A TIME WHEN YOU DID NOT HAVE A STEADY PLACE TO SLEEP OR SLEPT IN A SHELTER (INCLUDING NOW)?: NO

## 2023-07-12 SDOH — ECONOMIC STABILITY: TRANSPORTATION INSECURITY
IN THE PAST 12 MONTHS, HAS LACK OF TRANSPORTATION KEPT YOU FROM MEETINGS, WORK, OR FROM GETTING THINGS NEEDED FOR DAILY LIVING?: NO

## 2023-07-12 SDOH — ECONOMIC STABILITY: INCOME INSECURITY: HOW HARD IS IT FOR YOU TO PAY FOR THE VERY BASICS LIKE FOOD, HOUSING, MEDICAL CARE, AND HEATING?: NOT HARD AT ALL

## 2023-07-12 SDOH — ECONOMIC STABILITY: FOOD INSECURITY: WITHIN THE PAST 12 MONTHS, YOU WORRIED THAT YOUR FOOD WOULD RUN OUT BEFORE YOU GOT MONEY TO BUY MORE.: NEVER TRUE

## 2023-07-13 ENCOUNTER — OFFICE VISIT (OUTPATIENT)
Dept: FAMILY MEDICINE CLINIC | Age: 68
End: 2023-07-13
Payer: MEDICARE

## 2023-07-13 ENCOUNTER — OFFICE VISIT (OUTPATIENT)
Dept: ORTHOPEDIC SURGERY | Age: 68
End: 2023-07-13

## 2023-07-13 VITALS
DIASTOLIC BLOOD PRESSURE: 86 MMHG | WEIGHT: 193 LBS | SYSTOLIC BLOOD PRESSURE: 158 MMHG | BODY MASS INDEX: 32.95 KG/M2 | HEART RATE: 71 BPM | OXYGEN SATURATION: 98 % | RESPIRATION RATE: 16 BRPM | HEIGHT: 64 IN

## 2023-07-13 VITALS — HEIGHT: 64 IN | BODY MASS INDEX: 32.95 KG/M2 | WEIGHT: 193 LBS

## 2023-07-13 DIAGNOSIS — M25.511 ACUTE PAIN OF RIGHT SHOULDER: Primary | ICD-10-CM

## 2023-07-13 DIAGNOSIS — M25.811 IMPINGEMENT OF RIGHT SHOULDER: ICD-10-CM

## 2023-07-13 DIAGNOSIS — M25.511 RIGHT SHOULDER PAIN, UNSPECIFIED CHRONICITY: Primary | ICD-10-CM

## 2023-07-13 DIAGNOSIS — M54.12 CERVICAL RADICULOPATHY: ICD-10-CM

## 2023-07-13 PROCEDURE — 1090F PRES/ABSN URINE INCON ASSESS: CPT | Performed by: REGISTERED NURSE

## 2023-07-13 PROCEDURE — 99213 OFFICE O/P EST LOW 20 MIN: CPT | Performed by: REGISTERED NURSE

## 2023-07-13 PROCEDURE — 1036F TOBACCO NON-USER: CPT | Performed by: REGISTERED NURSE

## 2023-07-13 PROCEDURE — G8417 CALC BMI ABV UP PARAM F/U: HCPCS | Performed by: REGISTERED NURSE

## 2023-07-13 PROCEDURE — 1123F ACP DISCUSS/DSCN MKR DOCD: CPT | Performed by: REGISTERED NURSE

## 2023-07-13 PROCEDURE — G8399 PT W/DXA RESULTS DOCUMENT: HCPCS | Performed by: REGISTERED NURSE

## 2023-07-13 PROCEDURE — 3017F COLORECTAL CA SCREEN DOC REV: CPT | Performed by: REGISTERED NURSE

## 2023-07-13 PROCEDURE — G8427 DOCREV CUR MEDS BY ELIG CLIN: HCPCS | Performed by: REGISTERED NURSE

## 2023-07-13 RX ORDER — METHYLPREDNISOLONE 4 MG/1
TABLET ORAL
Qty: 1 KIT | Refills: 0 | Status: SHIPPED | OUTPATIENT
Start: 2023-07-13 | End: 2023-07-19

## 2023-07-13 ASSESSMENT — ENCOUNTER SYMPTOMS
BACK PAIN: 1
COLOR CHANGE: 0
GASTROINTESTINAL NEGATIVE: 1
SHORTNESS OF BREATH: 0

## 2023-07-13 NOTE — PATIENT INSTRUCTIONS
Schedule with Dr. Carol Rios. If unable to see him this week, recommend going to After hours clinic.

## 2023-07-13 NOTE — PROGRESS NOTES
signs of injury. Normal range of motion. Cervical back: Normal range of motion. Right lower leg: No edema. Left lower leg: No edema. Skin:     General: Skin is warm and dry. Coloration: Skin is not jaundiced or pale. Findings: No bruising or erythema. Neurological:      General: No focal deficit present. Mental Status: She is alert and oriented to person, place, and time. Psychiatric:         Mood and Affect: Mood normal.         Behavior: Behavior normal.         Thought Content: Thought content normal.         Judgment: Judgment normal.       Patient's past medical history, surgical history, family history, medications,  and allergies  were all reviewed and updated as appropriate today.     Patient Active Problem List   Diagnosis    Pure hypercholesterolemia    Vitamin D deficiency    Primary osteoarthritis of both knees    Achilles tendon tear, right, sequela    Localized osteoarthritis of left knee    Localized osteoarthritis of right knee     Past Medical History:   Diagnosis Date    Hyperlipidemia     Menopause     Menopause     OA (osteoarthritis)     PONV (postoperative nausea and vomiting)      (spontaneous vaginal delivery)           Past Surgical History:   Procedure Laterality Date    COLONOSCOPY  02/15/2001, 2006, 10/1/12    TOTAL KNEE ARTHROPLASTY Left 2022    LEFT TOTAL KNEE ARTHROPLASTY WITH ADDUCTOR CANAL BLOCK FOR PAIN CONTROL                  MEDACTA performed by Flavia Villa MD at [address] [facility] Right 3/6/2023    RIGHT TOTAL KNEE ARTHROPLASTY  performed by Flavia Villa MD at [address] [facility] EXTRACTION         Family History   Problem Relation Age of Onset    Cancer Mother         colon cancer/cervical cancer    Cataracts Mother     High Cholesterol Father     Cataracts Father     Thyroid Disease Sister     Cancer Brother         anal cancer    Thyroid Disease Niece     Breast Cancer Niece 52      Allergies
0 = independent

## 2023-07-13 NOTE — PROGRESS NOTES
signed by Cb Morales MD on 7/13/2023 at 2:53 PM  This dictation was generated by voice recognition computer software. Although all attempts are made to edit the dictation for accuracy, there may be errors in the transcription that are not intended.

## 2023-07-25 ENCOUNTER — HOSPITAL ENCOUNTER (OUTPATIENT)
Dept: PHYSICAL THERAPY | Age: 68
Setting detail: THERAPIES SERIES
Discharge: HOME | End: 2023-07-25
Payer: MEDICARE

## 2023-07-25 PROCEDURE — 97110 THERAPEUTIC EXERCISES: CPT | Performed by: PHYSICAL THERAPIST

## 2023-07-25 PROCEDURE — 97140 MANUAL THERAPY 1/> REGIONS: CPT | Performed by: PHYSICAL THERAPIST

## 2023-07-25 PROCEDURE — 97161 PT EVAL LOW COMPLEX 20 MIN: CPT | Performed by: PHYSICAL THERAPIST

## 2023-07-25 NOTE — FLOWSHEET NOTE
house/yardwork, driving/computer work      Manual Treatments:  PROM / STM / Oscillations-Mobs:  G-I, II, III, IV (PA's, Inf., Post.)  [x] (03426) Provided manual therapy to mobilize soft tissue/joints of cervical/CT, scapular GHJ and UE for the purpose of decreasing headache, modulating pain, promoting relaxation,  increasing ROM, reducing/eliminating soft tissue swelling/inflammation/restriction, improving soft tissue extensibility and allowing for proper ROM for normal function with self care, reaching, carrying, lifting, house/yardwork, driving/computer work    Modalities:      Charges  Timed Code Treatment Minutes: 30   Total Treatment Minutes: 55     Medicare total (add KX at $2000)  159     [x] EVAL (LOW) 27988   [] EVAL (MOD) 88337   [] EVAL (HIGH) 71798   [] RE-EVAL     [x] MC(12414) x   1  [] IONTO  [] NMR (99801) x     [] VASO  [x] Manual (68186) x 1     [] Other:  [] TA x      [] Mech Traction (82108)  [] ES(attended) (54984)      [] ES (un) (62138):     GOALS:  Patient stated goal: relieve pain in right shoulder/ arm. [] Progressing: [] Met: [] Not Met: [] Adjusted    Therapist goals for Patient:   Short Term Goals: To be achieved in: 2 weeks  1. Independent in HEP and progression per patient tolerance, in order to prevent re-injury. [] Progressing: [] Met: [] Not Met: [] Adjusted  2. Patient will have a decrease in pain to facilitate improvement in movement, function, and ADLs as indicated by Functional Deficits. [] Progressing: [] Met: [] Not Met: [] Adjusted    Long Term Goals: To be achieved in: 6 weeks  1. Disability index score of 15% or less for the Quick Dash to assist with reaching prior level of function. [] Progressing: [] Met: [] Not Met: [] Adjusted  2. Patient will demonstrate increased AROM to [facility] of cervical/thoracic spine to allow for proper joint functioning as indicated by patients Functional Deficits. [] Progressing: [] Met: [] Not Met: [] Adjusted  3.  Patient will demonstrate

## 2023-07-28 ENCOUNTER — HOSPITAL ENCOUNTER (OUTPATIENT)
Dept: PHYSICAL THERAPY | Age: 68
Setting detail: THERAPIES SERIES
Discharge: HOME | End: 2023-07-28
Payer: MEDICARE

## 2023-07-28 NOTE — FLOWSHEET NOTE
and significant impact on the need for therapy. (Significantly impacts the rate of recovery.)                   []  The patient has a complexity identified by an ICD-10 code that has a direct and significant impact on the need for therapy. (Significantly impacts the rate of recovery and is associated with a primary condition.)         []  The patient has associated variables that influence the amount of treatment to include:  Social support, self-efficacy/motivation, prognosis, time since onset/acuity. []  The patient has generalized musculoskeletal conditions or a condition affecting multiple sites that will have a direct impact on the rate of recovery. [x]  The patient had a prior episode of outpatient therapy during this calendar year for a different condition. []  The patient has a mental or cognitive disorder in addition to the condition being treated that will have a direct and significant impact on the rate of recovery. Electronically signed by:  Armand Mccartney PT    Note: If patient does not return for scheduled/ recommended follow up visits, this note will serve as a discharge from care along with most recent update on progress.

## 2023-08-01 ENCOUNTER — HOSPITAL ENCOUNTER (OUTPATIENT)
Dept: PHYSICAL THERAPY | Age: 68
Setting detail: THERAPIES SERIES
Discharge: HOME | End: 2023-08-01
Payer: MEDICARE

## 2023-08-01 PROCEDURE — 97110 THERAPEUTIC EXERCISES: CPT | Performed by: PHYSICAL THERAPIST

## 2023-08-01 PROCEDURE — 97012 MECHANICAL TRACTION THERAPY: CPT | Performed by: PHYSICAL THERAPIST

## 2023-08-01 PROCEDURE — 97140 MANUAL THERAPY 1/> REGIONS: CPT | Performed by: PHYSICAL THERAPIST

## 2023-08-01 NOTE — FLOWSHEET NOTE
[address] [place] [address], [address] [place] [address]  Phone: [phone]  Fax [phone]      Physical Therapy Treatment Note/ Progress Report:       Date:  2023    Patient Name:  Chrissy Nieves    :  1955  MRN: [id number]  Restrictions/Precautions:    Medical/Treatment Diagnosis Information:  Right shoulder pain [M25.511]  Cervical Disc with radi A72.43   [facility]ification information:   [facility]  Physician Information:  Isai Blandon MD  Has the plan of care been signed (Y/N):        []  Yes  [x]  No     Date of Patient follow up with Physician: aug 6     Is this a Progress Report:     []  Yes  [x]  No        If Yes:  Date Range for reporting period:  Beginnin23  Ending:    Progress report will be due (10 Rx or 30 days whichever is less):        Recertification will be due (POC Duration  / 90 days whichever is less):         Visit # Insurance Allowable Auth Required   In-person 3 bmn []  Yes []  No       []  Yes []  No    Total        Therapy Diagnosis/Practice Pattern:F      Number of Comorbidities:  []0     [x]1-2    []3+     Latex Allergy:  [x]NO      []YES  Preferred Language for Healthcare:   [x]English       []other:    SUBJECTIVE:  Pt had decreased symptoms down the arm for the past three days. Feels like traction helped.       OBJECTIVE: See eval  Observation:   Test measurements:      RESTRICTIONS/PRECAUTIONS: bilateral TKR, OA    Exercises/Interventions:   Therapeutic Ex        Sets/sec Reps Notes         HEP   hep   Pec s doorway :20 3x hep   Supine Horiz abd RD 2 x 10    Supine triceps ext 2# 2 x 10     TB No $ RD 2 x 10     Post cap s :20 3x    Wall push up  2 x 10    TB row BL 2 x 10    TB ext BL 2 x 10                             Manual Intervention           Thoracic PA  3 min    Man traction  5 min    Down glides  3 min    STM  3 min                      NMR re-education

## 2023-08-04 ENCOUNTER — HOSPITAL ENCOUNTER (OUTPATIENT)
Dept: PHYSICAL THERAPY | Age: 68
Setting detail: THERAPIES SERIES
Discharge: HOME | End: 2023-08-04
Payer: MEDICARE

## 2023-08-04 PROCEDURE — 97140 MANUAL THERAPY 1/> REGIONS: CPT | Performed by: PHYSICAL THERAPIST

## 2023-08-04 PROCEDURE — 97012 MECHANICAL TRACTION THERAPY: CPT | Performed by: PHYSICAL THERAPIST

## 2023-08-04 PROCEDURE — 97110 THERAPEUTIC EXERCISES: CPT | Performed by: PHYSICAL THERAPIST

## 2023-08-04 NOTE — FLOWSHEET NOTE
significant impact on the need for therapy. (Significantly impacts the rate of recovery.)                   []  The patient has a complexity identified by an ICD-10 code that has a direct and significant impact on the need for therapy. (Significantly impacts the rate of recovery and is associated with a primary condition.)         []  The patient has associated variables that influence the amount of treatment to include:  Social support, self-efficacy/motivation, prognosis, time since onset/acuity. []  The patient has generalized musculoskeletal conditions or a condition affecting multiple sites that will have a direct impact on the rate of recovery. [x]  The patient had a prior episode of outpatient therapy during this calendar year for a different condition. []  The patient has a mental or cognitive disorder in addition to the condition being treated that will have a direct and significant impact on the rate of recovery. Electronically signed by:  Joe Harris PT    Note: If patient does not return for scheduled/ recommended follow up visits, this note will serve as a discharge from care along with most recent update on progress.

## 2023-08-06 SDOH — HEALTH STABILITY: PHYSICAL HEALTH: ON AVERAGE, HOW MANY MINUTES DO YOU ENGAGE IN EXERCISE AT THIS LEVEL?: 40 MIN

## 2023-08-06 SDOH — HEALTH STABILITY: PHYSICAL HEALTH: ON AVERAGE, HOW MANY DAYS PER WEEK DO YOU ENGAGE IN MODERATE TO STRENUOUS EXERCISE (LIKE A BRISK WALK)?: 2 DAYS

## 2023-08-06 ASSESSMENT — SOCIAL DETERMINANTS OF HEALTH (SDOH)

## 2023-08-07 ENCOUNTER — OFFICE VISIT (OUTPATIENT)
Dept: ORTHOPEDIC SURGERY | Age: 68
End: 2023-08-07
Payer: MEDICARE

## 2023-08-07 VITALS — WEIGHT: 193 LBS | HEIGHT: 64 IN | BODY MASS INDEX: 32.95 KG/M2

## 2023-08-07 DIAGNOSIS — M54.2 CERVICAL PAIN: Primary | ICD-10-CM

## 2023-08-07 DIAGNOSIS — M54.12 CERVICAL RADICULOPATHY: ICD-10-CM

## 2023-08-07 PROCEDURE — 1036F TOBACCO NON-USER: CPT | Performed by: PHYSICIAN ASSISTANT

## 2023-08-07 PROCEDURE — 3017F COLORECTAL CA SCREEN DOC REV: CPT | Performed by: PHYSICIAN ASSISTANT

## 2023-08-07 PROCEDURE — 99214 OFFICE O/P EST MOD 30 MIN: CPT | Performed by: PHYSICIAN ASSISTANT

## 2023-08-07 PROCEDURE — G8427 DOCREV CUR MEDS BY ELIG CLIN: HCPCS | Performed by: PHYSICIAN ASSISTANT

## 2023-08-07 PROCEDURE — 1090F PRES/ABSN URINE INCON ASSESS: CPT | Performed by: PHYSICIAN ASSISTANT

## 2023-08-07 PROCEDURE — G8399 PT W/DXA RESULTS DOCUMENT: HCPCS | Performed by: PHYSICIAN ASSISTANT

## 2023-08-07 PROCEDURE — G8417 CALC BMI ABV UP PARAM F/U: HCPCS | Performed by: PHYSICIAN ASSISTANT

## 2023-08-07 PROCEDURE — 1123F ACP DISCUSS/DSCN MKR DOCD: CPT | Performed by: PHYSICIAN ASSISTANT

## 2023-08-07 NOTE — PROGRESS NOTES
New Patient: CERVICAL SPINE    Referring Provider:  Cb Morales MD    CHIEF COMPLAINT:    Chief Complaint   Patient presents with    Neck Pain     cervical       HISTORY OF PRESENT ILLNESS:   Ms. Mary Mcintyre is a pleasant 80 y.o. old female here for evaluation regarding her neck and right arm pain. She states the pain began insidiously about 6 weeks ago. Her pain has steadily continued since then. She rates her neck pain 2/10 and shoulder and arm numbness. She describes the pain as intermittent. Pain is worst with prolonged sitting and standing and improved with lying down. She has had 4 visits currently to physical therapy with benefit. She denies any lower extremity symptoms, progressive weakness, or bowel or bladder dysfunction. Pain Assessment  Location of Pain: Neck  Location Modifiers: Other (Comment)  Severity of Pain: 1  Quality of Pain: Other (Comment)  Frequency of Pain: Other (Comment)  Aggravating Factors:  Other (Comment)]  Current/Past Treatment:   Physical Therapy: Currently  Chiropractic: None  Injection: None  Medications: Recent prednisone taper    Past Medical History:   Past Medical History:   Diagnosis Date    Hyperlipidemia     Menopause     Menopause     OA (osteoarthritis)     PONV (postoperative nausea and vomiting)      (spontaneous vaginal delivery)             Past Surgical History:     Past Surgical History:   Procedure Laterality Date    COLONOSCOPY  02/15/2001, 2006, 10/1/12    TOTAL KNEE ARTHROPLASTY Left 2022    LEFT TOTAL KNEE ARTHROPLASTY WITH ADDUCTOR CANAL BLOCK FOR PAIN CONTROL                  MEDACTA performed by Cb Morales MD at [address] [facility] Right 3/6/2023    RIGHT TOTAL KNEE ARTHROPLASTY  performed by Cb Morales MD at [address] [facility] EXTRACTION         Current Medications:     Current Outpatient Medications:     Multiple Vitamin (MULTIVITAMIN PO), Take by mouth, Disp: , Rfl:     meloxicam (MOBIC) 15 MG tablet,

## 2023-08-08 ENCOUNTER — HOSPITAL ENCOUNTER (OUTPATIENT)
Dept: PHYSICAL THERAPY | Age: 68
Setting detail: THERAPIES SERIES
Discharge: HOME | End: 2023-08-08
Payer: MEDICARE

## 2023-08-08 PROCEDURE — 97140 MANUAL THERAPY 1/> REGIONS: CPT | Performed by: PHYSICAL THERAPIST

## 2023-08-08 PROCEDURE — 97110 THERAPEUTIC EXERCISES: CPT | Performed by: PHYSICAL THERAPIST

## 2023-08-08 PROCEDURE — 97012 MECHANICAL TRACTION THERAPY: CPT | Performed by: PHYSICAL THERAPIST

## 2023-08-08 NOTE — FLOWSHEET NOTE
one of the below criteria necessary for becoming an exception to the Medicare cap on therapy services:    [x]  The patient has a condition identified by an ICD-10 code that has a direct and significant impact on the need for therapy. (Significantly impacts the rate of recovery.)                   []  The patient has a complexity identified by an ICD-10 code that has a direct and significant impact on the need for therapy. (Significantly impacts the rate of recovery and is associated with a primary condition.)         []  The patient has associated variables that influence the amount of treatment to include:  Social support, self-efficacy/motivation, prognosis, time since onset/acuity. []  The patient has generalized musculoskeletal conditions or a condition affecting multiple sites that will have a direct impact on the rate of recovery. [x]  The patient had a prior episode of outpatient therapy during this calendar year for a different condition. []  The patient has a mental or cognitive disorder in addition to the condition being treated that will have a direct and significant impact on the rate of recovery. Electronically signed by:  Carmelina Cloud PT    Note: If patient does not return for scheduled/ recommended follow up visits, this note will serve as a discharge from care along with most recent update on progress.

## 2023-08-22 ENCOUNTER — HOSPITAL ENCOUNTER (OUTPATIENT)
Dept: WOMENS IMAGING | Age: 68
Discharge: HOME | End: 2023-08-22
Payer: MEDICARE

## 2023-08-22 VITALS — WEIGHT: 192 LBS | BODY MASS INDEX: 32.78 KG/M2 | HEIGHT: 64 IN

## 2023-08-22 DIAGNOSIS — Z12.31 VISIT FOR SCREENING MAMMOGRAM: ICD-10-CM

## 2023-08-22 PROCEDURE — 77063 BREAST TOMOSYNTHESIS BI: CPT

## 2024-01-02 RX ORDER — SULFAMETHOXAZOLE AND TRIMETHOPRIM 800; 160 MG/1; MG/1
1 TABLET ORAL 2 TIMES DAILY
Qty: 6 TABLET | Refills: 2 | Status: SHIPPED | OUTPATIENT
Start: 2024-01-02 | End: 2024-01-05

## 2024-06-24 ENCOUNTER — OFFICE VISIT (OUTPATIENT)
Dept: ORTHOPEDIC SURGERY | Age: 69
End: 2024-06-24
Payer: MEDICARE

## 2024-06-24 VITALS — HEIGHT: 64 IN | BODY MASS INDEX: 32.78 KG/M2 | WEIGHT: 192 LBS

## 2024-06-24 DIAGNOSIS — Z96.651 S/P TKR (TOTAL KNEE REPLACEMENT) USING CEMENT, RIGHT: Primary | ICD-10-CM

## 2024-06-24 DIAGNOSIS — Z96.652 S/P TOTAL KNEE ARTHROPLASTY, LEFT: ICD-10-CM

## 2024-06-24 PROCEDURE — G8427 DOCREV CUR MEDS BY ELIG CLIN: HCPCS | Performed by: PHYSICIAN ASSISTANT

## 2024-06-24 PROCEDURE — 99213 OFFICE O/P EST LOW 20 MIN: CPT | Performed by: PHYSICIAN ASSISTANT

## 2024-06-24 PROCEDURE — G8417 CALC BMI ABV UP PARAM F/U: HCPCS | Performed by: PHYSICIAN ASSISTANT

## 2024-06-24 PROCEDURE — G8399 PT W/DXA RESULTS DOCUMENT: HCPCS | Performed by: PHYSICIAN ASSISTANT

## 2024-06-24 PROCEDURE — 1090F PRES/ABSN URINE INCON ASSESS: CPT | Performed by: PHYSICIAN ASSISTANT

## 2024-06-24 PROCEDURE — 1123F ACP DISCUSS/DSCN MKR DOCD: CPT | Performed by: PHYSICIAN ASSISTANT

## 2024-06-24 PROCEDURE — 3017F COLORECTAL CA SCREEN DOC REV: CPT | Performed by: PHYSICIAN ASSISTANT

## 2024-06-24 PROCEDURE — 1036F TOBACCO NON-USER: CPT | Performed by: PHYSICIAN ASSISTANT

## 2024-06-24 NOTE — PROGRESS NOTES
Dr Luis Enrique Orr      Date /Time 2024       9:34 AM EDT  Name Sonia Lobato             1955   Location  [facility]  MRN [id number]                Chief Complaint   Patient presents with    Follow-up     Ck Bilateral TKA (Left 2022 and Right 2023)         History of Present Illness  Sonia Lobato is a 69 y.o. female who presents with  bilateral knee pain, .    Injury Mechanism:  none.  Worker's Comp. & legal issues:   none.  Previous Treatments: Ice, Heat, and NSAIDs    Patient presents to the office today for a follow-up visit.  Patient had a left total knee arthroplasty done 2022 and a right total knee arthroplasty done 2023.  Patient doing well on both sides.  She has no significant complaints.  She is able to do normal activities    Past History  Past Medical History:   Diagnosis Date    Hyperlipidemia     Menopause     Menopause     OA (osteoarthritis)     PONV (postoperative nausea and vomiting)      (spontaneous vaginal delivery)          Past Surgical History:   Procedure Laterality Date    COLONOSCOPY  02/15/2001, 2006, 10/1/12    TOTAL KNEE ARTHROPLASTY Left 2022    LEFT TOTAL KNEE ARTHROPLASTY WITH ADDUCTOR CANAL BLOCK FOR PAIN CONTROL                  MEDACTA performed by Luis Enrique Orr MD at [facility] OR    TOTAL KNEE ARTHROPLASTY Right 3/6/2023    RIGHT TOTAL KNEE ARTHROPLASTY  performed by Luis Enrique Orr MD at [facility] OR    WISDOM TOOTH EXTRACTION       Social History     Tobacco Use    Smoking status: Never    Smokeless tobacco: Never   Substance Use Topics    Alcohol use: No      Current Outpatient Medications on File Prior to Visit   Medication Sig Dispense Refill    Multiple Vitamin (MULTIVITAMIN PO) Take by mouth      meloxicam (MOBIC) 15 MG tablet TAKE 1 TABLET BY MOUTH EVERY DAY 30 tablet 0    [DISCONTINUED] Red Yeast Rice Extract (RED YEAST RICE PO) Take by mouth      [DISCONTINUED] diclofenac (VOLTAREN) 50 MG EC tablet Take 1

## 2024-07-15 RX ORDER — CLINDAMYCIN HYDROCHLORIDE 300 MG/1
CAPSULE ORAL
Qty: 6 CAPSULE | Refills: 0 | Status: SHIPPED | OUTPATIENT
Start: 2024-07-15

## 2024-08-23 ENCOUNTER — HOSPITAL ENCOUNTER (OUTPATIENT)
Dept: WOMENS IMAGING | Age: 69
Discharge: HOME | End: 2024-08-23
Payer: MEDICARE

## 2024-08-23 VITALS — HEIGHT: 64 IN | BODY MASS INDEX: 32.78 KG/M2 | WEIGHT: 192 LBS

## 2024-08-23 DIAGNOSIS — Z12.31 ENCOUNTER FOR SCREENING MAMMOGRAM FOR MALIGNANT NEOPLASM OF BREAST: ICD-10-CM

## 2024-08-23 PROCEDURE — 77063 BREAST TOMOSYNTHESIS BI: CPT

## 2025-08-25 ENCOUNTER — HOSPITAL ENCOUNTER (OUTPATIENT)
Dept: WOMENS IMAGING | Age: 70
Discharge: HOME | End: 2025-08-25
Payer: MEDICARE

## 2025-08-25 VITALS — WEIGHT: 208 LBS | BODY MASS INDEX: 35.51 KG/M2 | HEIGHT: 64 IN

## 2025-08-25 DIAGNOSIS — Z12.31 VISIT FOR SCREENING MAMMOGRAM: ICD-10-CM

## 2025-08-25 PROCEDURE — 77063 BREAST TOMOSYNTHESIS BI: CPT

## 2025-08-26 ENCOUNTER — RESULTS FOLLOW-UP (OUTPATIENT)
Dept: FAMILY MEDICINE CLINIC | Age: 70
End: 2025-08-26

## (undated) DEVICE — 3 BONE CEMENT MIXER: Brand: MIXEVAC

## (undated) DEVICE — SUTURE STRATAFIX SPRL SZ 1 L14IN ABSRB VLT L48CM CTX 1/2 SXPD2B405

## (undated) DEVICE — 2108 SERIES SAGITTAL BLADE, NO OFFSET  (12.4 X 1.19 X 82.1MM)

## (undated) DEVICE — ADHESIVE SKIN CLSR 0.7ML TOP DERMBND ADV

## (undated) DEVICE — Device

## (undated) DEVICE — GLOVE SURG SZ 8 L12IN FNGR THK79MIL GRN LTX FREE

## (undated) DEVICE — SYRINGE MED 30ML STD CLR PLAS LUERLOCK TIP N CTRL DISP

## (undated) DEVICE — HANDPIECE SET WITH HIGH FLOW TIP AND SUCTION TUBE: Brand: INTERPULSE

## (undated) DEVICE — TIBIAL BONE MODEL

## (undated) DEVICE — COVER,TABLE,HEAVY DUTY,50"X90",STRL: Brand: MEDLINE

## (undated) DEVICE — HOOD, PEEL-AWAY: Brand: FLYTE

## (undated) DEVICE — NEEDLE SPNL 20GA L3.5IN YEL HUB S STL REG WALL FIT STYL W/

## (undated) DEVICE — SYRINGE,TOOMEY,IRRIGATION,70CC,STERILE: Brand: MEDLINE

## (undated) DEVICE — ZIMMER® A.T.S. CYCLINDRICAL TOURNIQUET CUFF, SINGLE PORT, SINGLE BLADDER 30 IN. 76 CM)

## (undated) DEVICE — KIT CATH EXP NDL L6IN SOAK CATH L5IN T PEEL ON-Q SILVERSOAK

## (undated) DEVICE — OPTIFOAM GENTLE SA, POSTOP, 4X12: Brand: MEDLINE

## (undated) DEVICE — SYSTEM PAIN RELF 600ML W/ SEL A FLO 2-14ML/HR ON-Q

## (undated) DEVICE — BOWL AND CEMENT CARTRIDGE WITH BREAKAWAY FEMORAL NOZZLE AND MEDIUM PRESSURIZER: Brand: ACM

## (undated) DEVICE — THREADED PINS PACK: Brand: KNEE INSTRUMENTS

## (undated) DEVICE — GAUZE,SPONGE,2"X2",8PLY,STERILE,LF,2'S: Brand: MEDLINE

## (undated) DEVICE — SUTURE VCRL + SZ 2-0 L18IN ABSRB UD CT1 L36MM 1/2 CIR VCP839D

## (undated) DEVICE — SUTURE ETHBND EXCEL SZ 2 L30IN NONABSORBABLE GRN L40MM V-37 MX69G

## (undated) DEVICE — CT RIGHT MEDIAL #3 STERILE: Brand: MY KNEE TIBIAL CUTTING BLOCKS

## (undated) DEVICE — SYSTEM SKIN CLSR 22CM DERMBND PRINEO

## (undated) DEVICE — SUTURE MCRYL SZ 3-0 L27IN ABSRB UD L19MM PS-2 3/8 CIR PRIM Y427H

## (undated) DEVICE — PENCIL SMK EVAC ALL IN 1 DSGN ENH VISIBILITY IMPROVED AIR

## (undated) DEVICE — GLOVE ORTHO 7 1/2   MSG9475

## (undated) DEVICE — SOLUTION IRRIG 2000ML 0.9% SOD CHL USP UROMATIC PLAS CONT

## (undated) DEVICE — SUTURE STRATAFIX SPRL MCRYL + SZ 2-0 L18IN ABSRB UD CT-1 SXMP1B413

## (undated) DEVICE — STERILE PATIENT PROTECTIVE PAD FOR IMP® KNEE POSITIONERS & COHESIVE WRAP (10 / CASE): Brand: DE MAYO KNEE POSITIONER®

## (undated) DEVICE — 3M™ IOBAN™ 2 ANTIMICROBIAL INCISE DRAPE 6650EZ: Brand: IOBAN™ 2

## (undated) DEVICE — MYKNEE FEMDISCUTBL-CT-GMK-RM-#3+ ST: Brand: MY KNEE

## (undated) DEVICE — SPLINT KNEE UNIV FOR LESS THAN 36IN L20IN FOAM LAM E CNTCT

## (undated) DEVICE — BOOT POS LEG DEMAYO